# Patient Record
Sex: MALE | Race: WHITE | NOT HISPANIC OR LATINO | Employment: OTHER | URBAN - METROPOLITAN AREA
[De-identification: names, ages, dates, MRNs, and addresses within clinical notes are randomized per-mention and may not be internally consistent; named-entity substitution may affect disease eponyms.]

---

## 2017-01-02 PROCEDURE — 99285 EMERGENCY DEPT VISIT HI MDM: CPT

## 2017-01-02 ASSESSMENT — PAIN SCALES - GENERAL: PAINLEVEL_OUTOF10: 7

## 2017-01-03 ENCOUNTER — HOSPITAL ENCOUNTER (EMERGENCY)
Facility: MEDICAL CENTER | Age: 37
End: 2017-01-03
Attending: EMERGENCY MEDICINE
Payer: COMMERCIAL

## 2017-01-03 ENCOUNTER — APPOINTMENT (OUTPATIENT)
Dept: RADIOLOGY | Facility: MEDICAL CENTER | Age: 37
End: 2017-01-03
Attending: EMERGENCY MEDICINE
Payer: COMMERCIAL

## 2017-01-03 VITALS
HEART RATE: 49 BPM | BODY MASS INDEX: 23.64 KG/M2 | RESPIRATION RATE: 12 BRPM | WEIGHT: 178.35 LBS | HEIGHT: 73 IN | DIASTOLIC BLOOD PRESSURE: 92 MMHG | TEMPERATURE: 99.2 F | SYSTOLIC BLOOD PRESSURE: 148 MMHG | OXYGEN SATURATION: 98 %

## 2017-01-03 DIAGNOSIS — R10.84 GENERALIZED ABDOMINAL PAIN: ICD-10-CM

## 2017-01-03 LAB
ALBUMIN SERPL BCP-MCNC: 3.9 G/DL (ref 3.2–4.9)
ALBUMIN/GLOB SERPL: 1.8 G/DL
ALP SERPL-CCNC: 43 U/L (ref 30–99)
ALT SERPL-CCNC: 16 U/L (ref 2–50)
ANION GAP SERPL CALC-SCNC: 5 MMOL/L (ref 0–11.9)
AST SERPL-CCNC: 14 U/L (ref 12–45)
BASOPHILS # BLD AUTO: 0.6 % (ref 0–1.8)
BASOPHILS # BLD: 0.05 K/UL (ref 0–0.12)
BILIRUB SERPL-MCNC: 0.7 MG/DL (ref 0.1–1.5)
BUN SERPL-MCNC: 8 MG/DL (ref 8–22)
CALCIUM SERPL-MCNC: 9.2 MG/DL (ref 8.5–10.5)
CHLORIDE SERPL-SCNC: 104 MMOL/L (ref 96–112)
CO2 SERPL-SCNC: 28 MMOL/L (ref 20–33)
CREAT SERPL-MCNC: 1.1 MG/DL (ref 0.5–1.4)
EOSINOPHIL # BLD AUTO: 0.15 K/UL (ref 0–0.51)
EOSINOPHIL NFR BLD: 1.9 % (ref 0–6.9)
ERYTHROCYTE [DISTWIDTH] IN BLOOD BY AUTOMATED COUNT: 39.5 FL (ref 35.9–50)
GFR SERPL CREATININE-BSD FRML MDRD: >60 ML/MIN/1.73 M 2
GLOBULIN SER CALC-MCNC: 2.2 G/DL (ref 1.9–3.5)
GLUCOSE SERPL-MCNC: 110 MG/DL (ref 65–99)
HCT VFR BLD AUTO: 43.5 % (ref 42–52)
HGB BLD-MCNC: 14.6 G/DL (ref 14–18)
IMM GRANULOCYTES # BLD AUTO: 0.02 K/UL (ref 0–0.11)
IMM GRANULOCYTES NFR BLD AUTO: 0.2 % (ref 0–0.9)
LIPASE SERPL-CCNC: 8 U/L (ref 11–82)
LYMPHOCYTES # BLD AUTO: 2.05 K/UL (ref 1–4.8)
LYMPHOCYTES NFR BLD: 25.3 % (ref 22–41)
MCH RBC QN AUTO: 29 PG (ref 27–33)
MCHC RBC AUTO-ENTMCNC: 33.6 G/DL (ref 33.7–35.3)
MCV RBC AUTO: 86.5 FL (ref 81.4–97.8)
MONOCYTES # BLD AUTO: 0.83 K/UL (ref 0–0.85)
MONOCYTES NFR BLD AUTO: 10.3 % (ref 0–13.4)
NEUTROPHILS # BLD AUTO: 4.99 K/UL (ref 1.82–7.42)
NEUTROPHILS NFR BLD: 61.7 % (ref 44–72)
NRBC # BLD AUTO: 0 K/UL
NRBC BLD AUTO-RTO: 0 /100 WBC
PLATELET # BLD AUTO: 308 K/UL (ref 164–446)
PMV BLD AUTO: 9.3 FL (ref 9–12.9)
POTASSIUM SERPL-SCNC: 4.1 MMOL/L (ref 3.6–5.5)
PROT SERPL-MCNC: 6.1 G/DL (ref 6–8.2)
RBC # BLD AUTO: 5.03 M/UL (ref 4.7–6.1)
SODIUM SERPL-SCNC: 137 MMOL/L (ref 135–145)
WBC # BLD AUTO: 8.1 K/UL (ref 4.8–10.8)

## 2017-01-03 PROCEDURE — 80053 COMPREHEN METABOLIC PANEL: CPT

## 2017-01-03 PROCEDURE — 700111 HCHG RX REV CODE 636 W/ 250 OVERRIDE (IP): Performed by: EMERGENCY MEDICINE

## 2017-01-03 PROCEDURE — 85025 COMPLETE CBC W/AUTO DIFF WBC: CPT

## 2017-01-03 PROCEDURE — 96374 THER/PROPH/DIAG INJ IV PUSH: CPT

## 2017-01-03 PROCEDURE — 700117 HCHG RX CONTRAST REV CODE 255: Performed by: EMERGENCY MEDICINE

## 2017-01-03 PROCEDURE — 96375 TX/PRO/DX INJ NEW DRUG ADDON: CPT

## 2017-01-03 PROCEDURE — 74177 CT ABD & PELVIS W/CONTRAST: CPT

## 2017-01-03 PROCEDURE — 700105 HCHG RX REV CODE 258: Performed by: EMERGENCY MEDICINE

## 2017-01-03 PROCEDURE — 36415 COLL VENOUS BLD VENIPUNCTURE: CPT

## 2017-01-03 PROCEDURE — 83690 ASSAY OF LIPASE: CPT

## 2017-01-03 RX ORDER — ONDANSETRON 2 MG/ML
4 INJECTION INTRAMUSCULAR; INTRAVENOUS ONCE
Status: COMPLETED | OUTPATIENT
Start: 2017-01-03 | End: 2017-01-03

## 2017-01-03 RX ORDER — SODIUM CHLORIDE 9 MG/ML
1000 INJECTION, SOLUTION INTRAVENOUS ONCE
Status: COMPLETED | OUTPATIENT
Start: 2017-01-03 | End: 2017-01-03

## 2017-01-03 RX ORDER — DIPHENHYDRAMINE HYDROCHLORIDE 50 MG/ML
50 INJECTION INTRAMUSCULAR; INTRAVENOUS ONCE
Status: COMPLETED | OUTPATIENT
Start: 2017-01-03 | End: 2017-01-03

## 2017-01-03 RX ORDER — DICYCLOMINE HCL 20 MG
20 TABLET ORAL EVERY 6 HOURS
Qty: 30 TAB | Refills: 0 | Status: SHIPPED | OUTPATIENT
Start: 2017-01-03 | End: 2019-09-28

## 2017-01-03 RX ADMIN — ONDANSETRON 4 MG: 2 INJECTION, SOLUTION INTRAMUSCULAR; INTRAVENOUS at 02:05

## 2017-01-03 RX ADMIN — IOHEXOL 100 ML: 350 INJECTION, SOLUTION INTRAVENOUS at 02:49

## 2017-01-03 RX ADMIN — SODIUM CHLORIDE 1000 ML: 9 INJECTION, SOLUTION INTRAVENOUS at 02:04

## 2017-01-03 RX ADMIN — DIPHENHYDRAMINE HYDROCHLORIDE 50 MG: 50 INJECTION INTRAMUSCULAR; INTRAVENOUS at 03:44

## 2017-01-03 RX ADMIN — FENTANYL CITRATE 100 MCG: 50 INJECTION, SOLUTION INTRAMUSCULAR; INTRAVENOUS at 02:05

## 2017-01-03 ASSESSMENT — LIFESTYLE VARIABLES: DO YOU DRINK ALCOHOL: NO

## 2017-01-03 ASSESSMENT — PAIN SCALES - GENERAL: PAINLEVEL_OUTOF10: 3

## 2017-01-03 NOTE — ED AVS SNAPSHOT
After Visit Summary                                                                                                                Diego Abarca   MRN: [id number]    Department:  [facility], Emergency Dept   Date of Visit:  1/2/2017            [facility], Emergency Dept    [address] [facility] [address]    Phone:  [phone]      You were seen by     Jose Lawson M.D.      Your Diagnosis Was     Generalized abdominal pain     R10.84       These are the medications you received during your hospitalization from 01/02/2017 2313 to 01/03/2017 0333     Date/Time Order Dose Route Action    01/03/2017 0204 NS infusion 1,000 mL 1,000 mL Intravenous New Bag    01/03/2017 0205 fentaNYL (SUBLIMAZE) injection 100 mcg 100 mcg Intravenous Given    01/03/2017 0205 ondansetron (ZOFRAN) syringe/vial injection 4 mg 4 mg Intravenous Given    01/03/2017 0249 iohexol (OMNIPAQUE) 350 mg/mL 100 mL Intravenous Given      Follow-up Information     1. Follow up with [facility], Emergency Dept.    Specialty:  Emergency Medicine    Why:  If symptoms worsen    Contact information    [address] [place] [id number] [phone]        2. Follow up with [facility] In 3 days.    Why:  As needed    Contact information    [address] [facility] [address] [phone]      Medication Information     Review all of your home medications and newly ordered medications with your primary doctor and/or pharmacist as soon as possible. Follow medication instructions as directed by your doctor and/or pharmacist.     Please keep your complete medication list with you and share with your physician. Update the information when medications are discontinued, doses are changed, or new medications (including over-the-counter products) are added; and carry medication information at all times in the event of emergency situations.               Medication  List      START taking these medications        Instructions    dicyclomine 20 MG Tabs   Commonly known as:  BENTYL    Take 1 Tab by mouth every 6 hours.   Dose:  20 mg               Procedures and tests performed during your visit     CARDIAC MONITORING    CBC WITH DIFFERENTIAL    COMP METABOLIC PANEL    CONSENT FOR CONTRAST INJECTION    CT-ABDOMEN-PELVIS WITH    ESTIMATED GFR    IV Saline Lock    LIPASE    O2 Protocol    SALINE LOCK        Discharge Instructions       Abdominal Pain (Nonspecific)  Your exam might not show the exact reason you have abdominal pain. Since there are many different causes of abdominal pain, another checkup and more tests may be needed. It is very important to follow up for lasting (persistent) or worsening symptoms. A possible cause of abdominal pain in any person who still has his or her appendix is acute appendicitis. Appendicitis is often hard to diagnose. Normal blood tests, urine tests, ultrasound, and CT scans do not completely rule out early appendicitis or other causes of abdominal pain. Sometimes, only the changes that happen over time will allow appendicitis and other causes of abdominal pain to be determined. Other potential problems that may require surgery may also take time to become more apparent. Because of this, it is important that you follow all of the instructions below.  HOME CARE INSTRUCTIONS   · Rest as much as possible.   · Do not eat solid food until your pain is gone.   · While adults or children have pain: A diet of water, weak decaffeinated tea, broth or bouillon, gelatin, oral rehydration solutions (ORS), frozen ice pops, or ice chips may be helpful.   · When pain is gone in adults or children: Start a light diet (dry toast, crackers, applesauce, or white rice). Increase the diet slowly as long as it does not bother you. Eat no dairy products (including cheese and eggs) and no spicy, fatty, fried, or high-fiber foods.   · Use no alcohol, caffeine, or  cigarettes.   · Take your regular medicines unless your caregiver told you not to.   · Take any prescribed medicine as directed.   · Only take over-the-counter or prescription medicines for pain, discomfort, or fever as directed by your caregiver. Do not give aspirin to children.   If your caregiver has given you a follow-up appointment, it is very important to keep that appointment. Not keeping the appointment could result in a permanent injury and/or lasting (chronic) pain and/or disability. If there is any problem keeping the appointment, you must call to reschedule.   SEEK IMMEDIATE MEDICAL CARE IF:   · Your pain is not gone in 24 hours.   · Your pain becomes worse, changes location, or feels different.   · You or your child has an oral temperature above 102° F (38.9° C), not controlled by medicine.   · Your baby is older than 3 months with a rectal temperature of 102° F (38.9° C) or higher.   · Your baby is 3 months old or younger with a rectal temperature of 100.4° F (38° C) or higher.   · You have shaking chills.   · You keep throwing up (vomiting) or cannot drink liquids.   · There is blood in your vomit or you see blood in your bowel movements.   · Your bowel movements become dark or black.   · You have frequent bowel movements.   · Your bowel movements stop (become blocked) or you cannot pass gas.   · You have bloody, frequent, or painful urination.   · You have yellow discoloration in the skin or whites of the eyes.   · Your stomach becomes bloated or bigger.   · You have dizziness or fainting.   · You have chest or back pain.   MAKE SURE YOU:   · Understand these instructions.   · Will watch your condition.   · Will get help right away if you are not doing well or get worse.   Document Released: 12/18/2006 Document Revised: 03/11/2013 Document Reviewed: 11/15/2010  ExitCare® Patient Information ©2013 [employer].          Patient Information     Patient Information    Following emergency treatment: all  patient requiring follow-up care must return either to a private physician or a clinic if your condition worsens before you are able to obtain further medical attention, please return to the emergency room.     Billing Information    At [facility], we work to make the billing process streamlined for our patients.  Our Representatives are here to answer any questions you may have regarding your hospital bill.  If you have insurance coverage and have supplied your insurance information to us, we will submit a claim to your insurer on your behalf.  Should you have any questions regarding your bill, we can be reached online or by phone as follows:  Online: You are able pay your bills online or live chat with our representatives about any billing questions you may have. We are here to help Monday - Friday from 8:00am to 7:30pm and 9:00am - 12:00pm on Saturdays.  Please visit [email or web address]  for more information.   Phone:  [phone] or [phone]    Please note that your emergency physician, surgeon, pathologist, radiologist, anesthesiologist, and other specialists are not employed by [facility] and will therefore bill separately for their services.  Please contact them directly for any questions concerning their bills at the numbers below:     Emergency Physician Services:  [phone]  [facility]:  [phone]  [facility]:  [phone]  [facility]:  [phone]    1. Your final bill may vary from the amount quoted upon discharge if all procedures are not complete at that time, or if your doctor has additional procedures of which we are not aware. You will receive an additional bill if you return to the Emergency Department at [facility] for suture removal regardless of the facility of which the sutures were placed.     2. Please arrange for settlement of this account at the emergency registration.    3. All self-pay  accounts are due in full at the time of treatment.  If you are unable to meet this obligation then payment is expected within 4-5 days.     4. If you have had radiology studies (CT, X-ray, Ultrasound, MRI), you have received a preliminary result during your emergency department visit. Please contact the radiology department [phone] to receive a copy of your final result. Please discuss the Final result with your primary physician or with the follow up physician provided.     Crisis Hotline:  [place] Crisis Hotline:  6-881-ROZIRPT or 1-951.465.3149  [place] Crisis Hotline:    1-692.381.1833 or 727-567-0691         ED Discharge Follow Up Questions    1. In order to provide you with very good care, we would like to follow up with a phone call in the next few days.  May we have your permission to contact you?     YES /  NO    2. What is the best phone number to call you? (       )_____-__________    3. What is the best time to call you?      Morning  /  Afternoon  /  Evening                   Patient Signature:  ____________________________________________________________    Date:  ____________________________________________________________

## 2017-01-03 NOTE — DISCHARGE INSTRUCTIONS
Abdominal Pain (Nonspecific)  Your exam might not show the exact reason you have abdominal pain. Since there are many different causes of abdominal pain, another checkup and more tests may be needed. It is very important to follow up for lasting (persistent) or worsening symptoms. A possible cause of abdominal pain in any person who still has his or her appendix is acute appendicitis. Appendicitis is often hard to diagnose. Normal blood tests, urine tests, ultrasound, and CT scans do not completely rule out early appendicitis or other causes of abdominal pain. Sometimes, only the changes that happen over time will allow appendicitis and other causes of abdominal pain to be determined. Other potential problems that may require surgery may also take time to become more apparent. Because of this, it is important that you follow all of the instructions below.  HOME CARE INSTRUCTIONS   · Rest as much as possible.   · Do not eat solid food until your pain is gone.   · While adults or children have pain: A diet of water, weak decaffeinated tea, broth or bouillon, gelatin, oral rehydration solutions (ORS), frozen ice pops, or ice chips may be helpful.   · When pain is gone in adults or children: Start a light diet (dry toast, crackers, applesauce, or white rice). Increase the diet slowly as long as it does not bother you. Eat no dairy products (including cheese and eggs) and no spicy, fatty, fried, or high-fiber foods.   · Use no alcohol, caffeine, or cigarettes.   · Take your regular medicines unless your caregiver told you not to.   · Take any prescribed medicine as directed.   · Only take over-the-counter or prescription medicines for pain, discomfort, or fever as directed by your caregiver. Do not give aspirin to children.   If your caregiver has given you a follow-up appointment, it is very important to keep that appointment. Not keeping the appointment could result in a permanent injury and/or lasting (chronic) pain  and/or disability. If there is any problem keeping the appointment, you must call to reschedule.   SEEK IMMEDIATE MEDICAL CARE IF:   · Your pain is not gone in 24 hours.   · Your pain becomes worse, changes location, or feels different.   · You or your child has an oral temperature above 102° F (38.9° C), not controlled by medicine.   · Your baby is older than 3 months with a rectal temperature of 102° F (38.9° C) or higher.   · Your baby is 3 months old or younger with a rectal temperature of 100.4° F (38° C) or higher.   · You have shaking chills.   · You keep throwing up (vomiting) or cannot drink liquids.   · There is blood in your vomit or you see blood in your bowel movements.   · Your bowel movements become dark or black.   · You have frequent bowel movements.   · Your bowel movements stop (become blocked) or you cannot pass gas.   · You have bloody, frequent, or painful urination.   · You have yellow discoloration in the skin or whites of the eyes.   · Your stomach becomes bloated or bigger.   · You have dizziness or fainting.   · You have chest or back pain.   MAKE SURE YOU:   · Understand these instructions.   · Will watch your condition.   · Will get help right away if you are not doing well or get worse.   Document Released: 12/18/2006 Document Revised: 03/11/2013 Document Reviewed: 11/15/2010  ExitCare® Patient Information ©2013 [employer].

## 2017-01-03 NOTE — ED AVS SNAPSHOT
SportsHedge Access Code: [email or web address]  Expires: 2/2/2017  3:33 AM    SportsHedge  A secure, online tool to manage your health information     [employer]’s SportsHedge® is a secure, online tool that connects you to your personalized health information from the privacy of your home -- day or night - making it very easy for you to manage your healthcare. Once the activation process is completed, you can even access your medical information using the SportsHedge johanne, which is available for free in the Apple Johanne store or Google Play store.     SportsHedge provides the following levels of access (as shown below):   My Chart Features   [facility] Primary Care Doctor [facility]  Specialists [facility]  Urgent  Care Non-[facility]  Primary Care  Doctor   Email your healthcare team securely and privately 24/7 X X X X   Manage appointments: schedule your next appointment; view details of past/upcoming appointments X      Request prescription refills. X      View recent personal medical records, including lab and immunizations X X X X   View health record, including health history, allergies, medications X X X X   Read reports about your outpatient visits, procedures, consult and ER notes X X X X   See your discharge summary, which is a recap of your hospital and/or ER visit that includes your diagnosis, lab results, and care plan. X X       How to register for SportsHedge:  1. Go to  [email or web address].  2. Click on the Sign Up Now box, which takes you to the New Member Sign Up page. You will need to provide the following information:  a. Enter your SportsHedge Access Code exactly as it appears at the top of this page. (You will not need to use this code after you’ve completed the sign-up process. If you do not sign up before the expiration date, you must request a new code.)   b. Enter your date of birth.   c. Enter your home email address.   d. Click Submit, and follow the next screen’s instructions.  3. Create a SportsHedge ID. This will be your SportsHedge  login ID and cannot be changed, so think of one that is secure and easy to remember.  4. Create a [employer] password. You can change your password at any time.  5. Enter your Password Reset Question and Answer. This can be used at a later time if you forget your password.   6. Enter your e-mail address. This allows you to receive e-mail notifications when new information is available in [email or web address].  7. Click Sign Up. You can now view your health information.    For assistance activating your [employer].com account, call [phone]

## 2017-01-03 NOTE — ED NOTES
Attempted to complete discharge paperwork with patient. Patient extremely angry, yelling at RN that he is in too much pain and does not want to leave. Dr. Lawson called and case discussed.

## 2017-01-03 NOTE — ED NOTES
Patient extremely angry and cursing at RN before, during, and after medication being administered per orders.

## 2017-01-03 NOTE — ED PROVIDER NOTES
"ED Provider Note    CHIEF COMPLAINT  Chief Complaint   Patient presents with   • Abdominal Pain     diffused, radiating into lower back.  was admitting at [facility] earlier this week.  symptoms started on Saturday.   • Constipation     no relief with enemas   • Nausea       HPI  Diego Abarca is a 36 y.o. male who presents to the emergency department with abdominal discomfort. The patient states the pain started on Saturday. He was evaluated at [facility] at that time and he suspected it is secondary to constipation. The patient return to [facility] yesterday and their concern for an obstructive process versus a surgical etiology. He told the patient is insurance was from [employer] and therefore they discharged him home and told him to come here if he was acutely worse. The patient continues to have nausea as well as abdominal discomfort despite treatment with Zofran and pain medication. He does have some constipation. He has not had any blood in his stool. He has not had any recent foreign travel nor is he been exposed to any antibiotics. He has not had any prior abdominal surgeries.    REVIEW OF SYSTEMS  See HPI for further details. All other systems are negative.     PAST MEDICAL HISTORY  History reviewed. No pertinent past medical history.    SOCIAL HISTORY  Social History     Social History   • Marital Status:      Spouse Name: N/A   • Number of Children: N/A   • Years of Education: N/A     Social History Main Topics   • Smoking status: Never Smoker    • Smokeless tobacco: None   • Alcohol Use: Yes      Comment: occasionally   • Drug Use: Yes     Special: Inhaled      Comment: marijuana   • Sexual Activity: Not Asked     Other Topics Concern   • None     Social History Narrative           PHYSICAL EXAM  VITAL SIGNS: /92 mmHg  Pulse 48  Temp(Src) 37.3 °C (99.2 °F)  Resp 19  Ht 1.854 m (6' 0.99\")  Wt 80.9 kg (178 lb 5.6 oz)  BMI 23.54 kg/m2  SpO2 100%  Constitutional: Mild acute " distress, Non-toxic appearance.   HENT: Normocephalic, Atraumatic, tympanic membranes are intact and nonerythematous bilaterally, Oropharynx moist without exudates or erythema, Nose normal.   Eyes: PERRLA, EOMI, Conjunctiva normal.  Neck: Supple without meningismus  Lymphatic: No lymphadenopathy noted.   Cardiovascular: Normal heart rate, Normal rhythm, No murmurs, No rubs, No gallops.   Thorax & Lungs: Normal breath sounds, No respiratory distress, No wheezing, No chest tenderness.   Abdomen: Diffuse discomfort, no rebound, no guarding, normal bowel sounds  Skin: Warm, Dry, No erythema, No rash.   Back: No tenderness, No CVA tenderness.   Extremities: Atraumatic with symmetric distal pulses, No edema, No tenderness, No cyanosis, No clubbing.   Neurologic: Alert & oriented x 3, cranial nerves II through XII are intact, Normal motor function, Normal sensory function, No focal deficits noted.   Psychiatric: Affect normal, Judgment normal, Mood normal.       RADIOLOGY/PROCEDURES  CT-ABDOMEN-PELVIS WITH   Final Result      Small amount of nonspecific free pelvic fluid. No additional abnormalities are identified.            COURSE & MEDICAL DECISION MAKING  Pertinent Labs & Imaging studies reviewed. (See chart for details)  This a 36-year-old male who presents with abdominal discomfort. The CT scan does not show any evidence of a surgical source. This sounds like they had found mesenteric adenitis in the past. The patient's abdomen is nonsurgical. He does smoke marijuana daily basis and he may also be developing gastroparesis. His abdomen is nonsurgical as mentioned above. Therefore we'll discharge the patient home on Bentyl with instructions to start a bananas, rice, apples, and toast diet. He'll refrain from any further marijuana use. If he is not better in 48-72 hours I did give him a referral to a gastroenterologist. If he is acutely worse within the next 24 hours he'll return for repeat examination.    FINAL  IMPRESSION  1. Abdominal pain     Disposition  The patient will be discharged in stable condition.    Electronically signed by: Jose Lawson, 1/3/2017 2:03 AM

## 2017-01-03 NOTE — ED NOTES
Patient states that pain has improved to 2-3/10 with medication given per orders. States that nausea has resolves with medication given per orders.

## 2017-01-03 NOTE — ED NOTES
Patient complaining of 7/10 LUQ pain accompanied by 7/10 L low back pain. States that pain radiates to entire abdomen. Pain unrelieved at home with medication prescribed by [place]. Complains of fairly persistent nausea that he states is controlled with Zofran ODT at home.

## 2017-01-03 NOTE — ED NOTES
Diego Abarca  Chief Complaint   Patient presents with   • Abdominal Pain     diffused, radiating into lower back.  was admitting at [facility] earlier this week.  symptoms started on Saturday.   • Constipation     no relief with enemas   • Nausea     Pt ambulatory to triage with above complaint.  VSS. C/o sharp/ cramping pain 7/10.  Was told at [facility] that he might need surgery, but not sure what.   Pt returned to lobby, educated on triage process, and to inform staff of any changes or concerns.

## 2017-01-03 NOTE — ED AVS SNAPSHOT
1/3/2017          Diego Abarca  [address] [facility] [facility] [address]    Dear Diego:    [facility] wants to ensure your discharge home is safe and you or your loved ones have had all your questions answered regarding your care after you leave the hospital.    You may receive a telephone call within two days of your discharge.  This call is to make certain you understand your discharge instructions as well as ensure we provided you with the best care possible during your stay with us.     The call will only last approximately 3-5 minutes and will be done by a nurse.    Once again, we want to ensure your discharge home is safe and that you have a clear understanding of any next steps in your care.  If you have any questions or concerns, please do not hesitate to contact us, we are here for you.  Thank you for choosing [facility] for your healthcare needs.    Sincerely,    Stewart Pryor    [facility]

## 2017-01-03 NOTE — ED NOTES
"Discharge paperwork reviewed with patient. When discussing follow-up appointments with [facility] patient states \"I've been there before. I probably won't go there. If I have more pain I'll just figure something out.\" Patient signed discharge papers.  "

## 2017-01-04 ENCOUNTER — HOSPITAL ENCOUNTER (OUTPATIENT)
Facility: MEDICAL CENTER | Age: 37
End: 2017-01-04
Attending: EMERGENCY MEDICINE | Admitting: INTERNAL MEDICINE
Payer: COMMERCIAL

## 2017-01-04 VITALS
BODY MASS INDEX: 24.25 KG/M2 | HEART RATE: 53 BPM | SYSTOLIC BLOOD PRESSURE: 119 MMHG | TEMPERATURE: 98.4 F | HEIGHT: 73 IN | OXYGEN SATURATION: 98 % | WEIGHT: 182.98 LBS | DIASTOLIC BLOOD PRESSURE: 57 MMHG | RESPIRATION RATE: 16 BRPM

## 2017-01-04 DIAGNOSIS — Z53.29 LEFT AGAINST MEDICAL ADVICE: ICD-10-CM

## 2017-01-04 DIAGNOSIS — R10.84 GENERALIZED ABDOMINAL PAIN: ICD-10-CM

## 2017-01-04 DIAGNOSIS — R52 INTRACTABLE PAIN: ICD-10-CM

## 2017-01-04 PROBLEM — R10.9 ABDOMINAL PAIN: Status: ACTIVE | Noted: 2017-01-04

## 2017-01-04 PROCEDURE — G0378 HOSPITAL OBSERVATION PER HR: HCPCS

## 2017-01-04 PROCEDURE — 96372 THER/PROPH/DIAG INJ SC/IM: CPT

## 2017-01-04 PROCEDURE — 36415 COLL VENOUS BLD VENIPUNCTURE: CPT

## 2017-01-04 PROCEDURE — 99285 EMERGENCY DEPT VISIT HI MDM: CPT

## 2017-01-04 PROCEDURE — 700105 HCHG RX REV CODE 258: Performed by: EMERGENCY MEDICINE

## 2017-01-04 PROCEDURE — 96375 TX/PRO/DX INJ NEW DRUG ADDON: CPT

## 2017-01-04 PROCEDURE — 96374 THER/PROPH/DIAG INJ IV PUSH: CPT

## 2017-01-04 PROCEDURE — 96361 HYDRATE IV INFUSION ADD-ON: CPT

## 2017-01-04 PROCEDURE — 700111 HCHG RX REV CODE 636 W/ 250 OVERRIDE (IP): Performed by: EMERGENCY MEDICINE

## 2017-01-04 RX ORDER — HALOPERIDOL 5 MG/ML
5 INJECTION INTRAMUSCULAR ONCE
Status: COMPLETED | OUTPATIENT
Start: 2017-01-04 | End: 2017-01-04

## 2017-01-04 RX ORDER — ONDANSETRON 2 MG/ML
4 INJECTION INTRAMUSCULAR; INTRAVENOUS ONCE
Status: COMPLETED | OUTPATIENT
Start: 2017-01-04 | End: 2017-01-04

## 2017-01-04 RX ORDER — ONDANSETRON 2 MG/ML
4 INJECTION INTRAMUSCULAR; INTRAVENOUS
Status: CANCELLED | OUTPATIENT
Start: 2017-01-04

## 2017-01-04 RX ORDER — SODIUM CHLORIDE 9 MG/ML
2000 INJECTION, SOLUTION INTRAVENOUS ONCE
Status: COMPLETED | OUTPATIENT
Start: 2017-01-04 | End: 2017-01-04

## 2017-01-04 RX ORDER — DEXTROSE AND SODIUM CHLORIDE 5; .9 G/100ML; G/100ML
INJECTION, SOLUTION INTRAVENOUS CONTINUOUS
Status: CANCELLED | OUTPATIENT
Start: 2017-01-04

## 2017-01-04 RX ORDER — DICYCLOMINE HCL 20 MG
20 TABLET ORAL EVERY 6 HOURS
Status: DISCONTINUED | OUTPATIENT
Start: 2017-01-04 | End: 2017-01-04 | Stop reason: HOSPADM

## 2017-01-04 RX ADMIN — HYDROMORPHONE HYDROCHLORIDE 1 MG: 1 INJECTION, SOLUTION INTRAMUSCULAR; INTRAVENOUS; SUBCUTANEOUS at 06:40

## 2017-01-04 RX ADMIN — ONDANSETRON HYDROCHLORIDE 4 MG: 2 INJECTION, SOLUTION INTRAMUSCULAR; INTRAVENOUS at 06:40

## 2017-01-04 RX ADMIN — HALOPERIDOL LACTATE 5 MG: 5 INJECTION, SOLUTION INTRAMUSCULAR at 09:00

## 2017-01-04 RX ADMIN — SODIUM CHLORIDE 2000 ML: 9 INJECTION, SOLUTION INTRAVENOUS at 06:40

## 2017-01-04 ASSESSMENT — PAIN SCALES - GENERAL: PAINLEVEL_OUTOF10: 10

## 2017-01-04 NOTE — ED AVS SNAPSHOT
1/4/2017          Diego Abarca  [address] [facility] [address]    Dear Diego:    [facility] wants to ensure your discharge home is safe and you or your loved ones have had all your questions answered regarding your care after you leave the hospital.    You may receive a telephone call within two days of your discharge.  This call is to make certain you understand your discharge instructions as well as ensure we provided you with the best care possible during your stay with us.     The call will only last approximately 3-5 minutes and will be done by a nurse.    Once again, we want to ensure your discharge home is safe and that you have a clear understanding of any next steps in your care.  If you have any questions or concerns, please do not hesitate to contact us, we are here for you.  Thank you for choosing [facility] for your healthcare needs.    Sincerely,    Stewart Pryor    [facility]

## 2017-01-04 NOTE — ED NOTES
Pt left AMA. AMA form signed and on chart. Pt D/C to home. IV removed. D/C instructions given. Pt v/u. Pt leaves ED with no acute changes, complaints or concerns.

## 2017-01-04 NOTE — ED NOTES
"Pt medicated per ERP order. Pt states \" I dont want to stay if they arent going to do anything about my pain. I will just come back later today after i go home and throw up.\"  "

## 2017-01-04 NOTE — ED NOTES
Med Rec completed per patient  Allergies reviewed  No ORAL antibiotics in last 30 days    Family at bedside stated she thinks he got a steroid shot at [facility] on 01-02-17

## 2017-01-04 NOTE — ED NOTES
Chief Complaint   Patient presents with   • Abdominal Pain     kidney pain   • Migraine   • Constipation   Per pt, he has been seen 3 other times in the last 3-4 days at [facility] and [facility] x2.

## 2017-01-04 NOTE — ED AVS SNAPSHOT
After Visit Summary                                                                                                                Diego Abarca   MRN: [id number]    Department:  [facility], Emergency Dept   Date of Visit:  1/4/2017            [facility], Emergency Dept    [address]    [place] [address]    Phone:  [phone]      You were seen by     Silverio Hardy M.D.      Your Diagnosis Was     Generalized abdominal pain     R10.84       These are the medications you received during your hospitalization from 01/04/2017 0541 to 01/04/2017 0959     Date/Time Order Dose Route Action    01/04/2017 0640 NS infusion 2,000 mL 2,000 mL Intravenous New Bag    01/04/2017 0640 HYDROmorphone (DILAUDID) injection 1 mg 1 mg Intravenous Given    01/04/2017 0640 ondansetron (ZOFRAN) syringe/vial injection 4 mg 4 mg Intravenous Given    01/04/2017 0900 haloperidol lactate (HALDOL) injection 5 mg 5 mg Intramuscular Given      Medication Information     Review all of your home medications and newly ordered medications with your primary doctor and/or pharmacist as soon as possible. Follow medication instructions as directed by your doctor and/or pharmacist.     Please keep your complete medication list with you and share with your physician. Update the information when medications are discontinued, doses are changed, or new medications (including over-the-counter products) are added; and carry medication information at all times in the event of emergency situations.               Medication List      ASK your doctor about these medications        Instructions    dicyclomine 20 MG Tabs   Commonly known as:  BENTYL    Take 1 Tab by mouth every 6 hours.   Dose:  20 mg               Procedures and tests performed during your visit     Procedure/Test Number of Times Performed    ADMISSION ORDER (OBSERVATION-OUTPATIENT) 2            Patient Information     Patient  Information    Following emergency treatment: all patient requiring follow-up care must return either to a private physician or a clinic if your condition worsens before you are able to obtain further medical attention, please return to the emergency room.     Billing Information    At [facility], we work to make the billing process streamlined for our patients.  Our Representatives are here to answer any questions you may have regarding your hospital bill.  If you have insurance coverage and have supplied your insurance information to us, we will submit a claim to your insurer on your behalf.  Should you have any questions regarding your bill, we can be reached online or by phone as follows:  Online: You are able pay your bills online or live chat with our representatives about any billing questions you may have. We are here to help Monday - Friday from 8:00am to 7:30pm and 9:00am - 12:00pm on Saturdays.  Please visit [email or web address]  for more information.   Phone:  [phone] or [phone]    Please note that your emergency physician, surgeon, pathologist, radiologist, anesthesiologist, and other specialists are not employed by [facility] and will therefore bill separately for their services.  Please contact them directly for any questions concerning their bills at the numbers below:     Emergency Physician Services:  [phone]  [facility]:  [phone]  [facility]:  [phone]  [facility]:  [phone]    1. Your final bill may vary from the amount quoted upon discharge if all procedures are not complete at that time, or if your doctor has additional procedures of which we are not aware. You will receive an additional bill if you return to the Emergency Department at [facility] for suture removal regardless of the facility of which the sutures were placed.     2. Please arrange for settlement of this account  at the emergency registration.    3. All self-pay accounts are due in full at the time of treatment.  If you are unable to meet this obligation then payment is expected within 4-5 days.     4. If you have had radiology studies (CT, X-ray, Ultrasound, MRI), you have received a preliminary result during your emergency department visit. Please contact the radiology department [phone] to receive a copy of your final result. Please discuss the Final result with your primary physician or with the follow up physician provided.     Crisis Hotline:  [place] Crisis Hotline:  9-491-PSXCUWG or 1-616.711.9960  [place] Crisis Hotline:    1-899.425.8446 or 078-490-7623         ED Discharge Follow Up Questions    1. In order to provide you with very good care, we would like to follow up with a phone call in the next few days.  May we have your permission to contact you?     YES /  NO    2. What is the best phone number to call you? (       )_____-__________    3. What is the best time to call you?      Morning  /  Afternoon  /  Evening                   Patient Signature:  ____________________________________________________________    Date:  ____________________________________________________________

## 2017-01-04 NOTE — ED AVS SNAPSHOT
[employer] Access Code: [email or web address]  Expires: 2/2/2017  3:33 AM    TenasiTech  A secure, online tool to manage your health information     [email or web address]’s TenasiTech® is a secure, online tool that connects you to your personalized health information from the privacy of your home -- day or night - making it very easy for you to manage your healthcare. Once the activation process is completed, you can even access your medical information using the [employer] johanne, which is available for free in the Apple Johanne store or Google Play store.     [employer] provides the following levels of access (as shown below):   My Chart Features   [facility] Primary Care Doctor [facility]  Specialists [facility]  Urgent  Care Non-[facility]  Primary Care  Doctor   Email your healthcare team securely and privately 24/7 X X X X   Manage appointments: schedule your next appointment; view details of past/upcoming appointments X      Request prescription refills. X      View recent personal medical records, including lab and immunizations X X X X   View health record, including health history, allergies, medications X X X X   Read reports about your outpatient visits, procedures, consult and ER notes X X X X   See your discharge summary, which is a recap of your hospital and/or ER visit that includes your diagnosis, lab results, and care plan. X X       How to register for [employer]:  1. Go to  [email or web address].  2. Click on the Sign Up Now box, which takes you to the New Member Sign Up page. You will need to provide the following information:  a. Enter your [employer] Access Code exactly as it appears at the top of this page. (You will not need to use this code after you’ve completed the sign-up process. If you do not sign up before the expiration date, you must request a new code.)   b. Enter your date of birth.   c. Enter your home email address.   d. Click Submit, and follow the next screen’s instructions.  3. Create a TenasiTech ID. This will be your TenasiTech  login ID and cannot be changed, so think of one that is secure and easy to remember.  4. Create a [employer]weet password. You can change your password at any time.  5. Enter your Password Reset Question and Answer. This can be used at a later time if you forget your password.   6. Enter your e-mail address. This allows you to receive e-mail notifications when new information is available in [employer].  7. Click Sign Up. You can now view your health information.    For assistance activating your [employer] account, call [phone]

## 2017-01-04 NOTE — ED PROVIDER NOTES
"ED Provider Note    CHIEF COMPLAINT  Chief Complaint   Patient presents with   • Abdominal Pain     kidney pain   • Migraine   • Constipation       HPI  Diego Abarca is a 36 y.o. male who presents to the emergency department with a chief complaint of abdominal pain. The patient localizes abdominal pain to the upper abdomen and says it radiates around to both sides. Associated with nausea and vomiting. The patient has been seen in the emergency department on 3 prior occasions. He has undergone 3 previous CT scans and multiple laboratory studies. The patient returns today for ongoing abdominal pain. It is associated with nausea and vomiting. He rates it as severe. He has been feeling somewhat constipated. He has not had any diarrhea.    REVIEW OF SYSTEMS  See HPI for further details. All other systems are negative.     PAST MEDICAL HISTORY  History reviewed. No pertinent past medical history.    FAMILY HISTORY  History reviewed. No pertinent family history.    SOCIAL HISTORY  Social History     Social History   • Marital Status:      Spouse Name: N/A   • Number of Children: N/A   • Years of Education: N/A     Social History Main Topics   • Smoking status: Never Smoker    • Smokeless tobacco: None   • Alcohol Use: Yes      Comment: rarely   • Drug Use: Yes     Special: Inhaled      Comment: marijuana daily   • Sexual Activity: Not Asked     Other Topics Concern   • None     Social History Narrative       SURGICAL HISTORY  Past Surgical History   Procedure Laterality Date   • Other orthopedic surgery         CURRENT MEDICATIONS  Home Medications     Reviewed by Lisandra Scott R.N. (Registered Nurse) on 01/04/17 at 0608  Med List Status: <None>    Medication Last Dose Status    dicyclomine (BENTYL) 20 MG Tab  Active                ALLERGIES  No Known Allergies    PHYSICAL EXAM  VITAL SIGNS: /101 mmHg  Pulse 46  Temp(Src) 36.9 °C (98.4 °F)  Resp 16  Ht 1.854 m (6' 0.99\")  Wt 83 kg (182 lb 15.7 oz)  BMI " 24.15 kg/m2  SpO2 100%  Constitutional: Well developed, Well nourished, mild distress, Non-toxic appearance.   HENT: Normocephalic, Atraumatic, Bilateral external ears normal, Oropharynx moist, No oral exudates, Nose normal.   Eyes: PERRL, EOMI, Conjunctiva normal, No discharge.   Neck: Normal range of motion, No tenderness, Supple, No stridor.   Lymphatic: No lymphadenopathy noted.   Cardiovascular: Normal heart rate, Normal rhythm, No murmurs, No rubs, No gallops.   Thorax & Lungs: Normal breath sounds, No respiratory distress, No wheezing, No chest tenderness.   Abdomen: Soft, tenderness in the epigastrium, no lower abdominal tenderness to palpation, no peritoneal signs, active bowel sounds.   Skin: Warm, Dry, No erythema, No rash.   Back: No tenderness, No CVA tenderness.   Extremities: Intact distal pulses, No edema, No tenderness, No cyanosis, No clubbing.   Neurologic: Alert & oriented x 3, Normal motor function, Normal sensory function, No focal deficits noted.       RADIOLOGY/PROCEDURES      COURSE & MEDICAL DECISION MAKING  Pertinent Labs & Imaging studies reviewed. (See chart for details)    The patient presents today with abdominal pain. He is had a fairly extensive workup on numerous occasions. This evaluation has been unrevealing. I reviewed the most recent workup in the electronic medical record. CT scan was performed and laboratory studies were performed. These were unrevealing. The patient seems to be a fairly heavy marijuana user. I wonder if this may be related to cannabinoids hyperemesis syndrome. The patient is treated in the emergency department with IV fluids, hydromorphone, and Zofran. He has some improvement of his symptoms. However his symptoms then return. I elected to treat the patient with intramuscular Haldol. Given his multiple visits to the emergency department I felt that admission to hospital was appropriate. I spoke with the on-call hospitalist Dr. Hanson for admission.      10:04  AM Dr. Hanson saw the patient at bedside. Apparently the patient is choosing to leave against medical advice. He knows that my recommendation is admission to the hospital for further evaluation and treatment. He seems to be frustrated and angry and requesting additional pain medications.    FINAL IMPRESSION  1. Generalized abdominal pain    2. Intractable pain    3. Left against medical advice            Electronically signed by: Silverio Hardy, 1/4/2017 6:35 AM

## 2017-01-05 NOTE — ADDENDUM NOTE
Encounter addended by: Niecy Bland R.N. on: 1/5/2017  1:14 PM<BR>     Documentation filed: Inpatient Document Flowsheet

## 2017-07-12 ENCOUNTER — HOSPITAL ENCOUNTER (OUTPATIENT)
Facility: MEDICAL CENTER | Age: 37
End: 2017-07-12
Payer: COMMERCIAL

## 2017-07-12 LAB
BDY FAT % MEASURED: 10.5 %
BP DIAS: 64 MMHG
BP SYS: 104 MMHG
CHOLEST SERPL-MCNC: 126 MG/DL (ref 100–199)
DIABETES HTDIA: NO
EVENT NAME HTEVT: NORMAL
FASTING HTFAS: YES
GLUCOSE SERPL-MCNC: 85 MG/DL (ref 65–99)
HDLC SERPL-MCNC: 55 MG/DL
HYPERTENSION HTHYP: NO
LDLC SERPL CALC-MCNC: 59 MG/DL
SCREENING LOC CITY HTCIT: NORMAL
SCREENING LOC STATE HTSTA: NORMAL
SCREENING LOCATION HTLOC: NORMAL
SMOKING HTSMO: NO
SUBSCRIBER ID HTSID: NORMAL
TRIGL SERPL-MCNC: 61 MG/DL (ref 0–149)

## 2017-07-12 PROCEDURE — S5190 WELLNESS ASSESSMENT BY NONPH: HCPCS

## 2017-07-12 PROCEDURE — 82947 ASSAY GLUCOSE BLOOD QUANT: CPT

## 2017-07-12 PROCEDURE — 80061 LIPID PANEL: CPT

## 2018-02-27 ENCOUNTER — OFFICE VISIT (OUTPATIENT)
Dept: PULMONOLOGY | Facility: HOSPICE | Age: 38
End: 2018-02-27
Payer: COMMERCIAL

## 2018-02-27 VITALS
RESPIRATION RATE: 14 BRPM | HEART RATE: 70 BPM | HEIGHT: 73 IN | DIASTOLIC BLOOD PRESSURE: 60 MMHG | WEIGHT: 184 LBS | TEMPERATURE: 97.5 F | SYSTOLIC BLOOD PRESSURE: 98 MMHG | OXYGEN SATURATION: 96 % | BODY MASS INDEX: 24.39 KG/M2

## 2018-02-27 DIAGNOSIS — J45.20 MILD INTERMITTENT ASTHMA WITHOUT COMPLICATION: ICD-10-CM

## 2018-02-27 DIAGNOSIS — Z14.8 ALPHA-1-ANTITRYPSIN DEFICIENCY CARRIER: ICD-10-CM

## 2018-02-27 PROCEDURE — 99244 OFF/OP CNSLTJ NEW/EST MOD 40: CPT | Performed by: INTERNAL MEDICINE

## 2018-02-27 RX ORDER — ALBUTEROL SULFATE 90 UG/1
2 AEROSOL, METERED RESPIRATORY (INHALATION) EVERY 6 HOURS PRN
COMMUNITY
End: 2019-01-21 | Stop reason: SDUPTHER

## 2018-02-27 RX ORDER — OMEPRAZOLE 20 MG/1
CAPSULE, DELAYED RELEASE ORAL
COMMUNITY
Start: 2018-01-09 | End: 2019-09-28

## 2018-02-27 RX ORDER — SODIUM, POTASSIUM,MAG SULFATES 17.5-3.13G
SOLUTION, RECONSTITUTED, ORAL ORAL
COMMUNITY
Start: 2018-02-22 | End: 2019-09-28

## 2018-02-27 NOTE — PROGRESS NOTES
Chief Complaint   Patient presents with   • New Patient     Alpha 1 deficiency       HPI:  The patient is a 37-year-old man who [facility]. He is a lifelong nonsmoker of cigarettes. However, he does smoke marijuana several times per week. He has a family history of lung disease. Testing in 2012 demonstrated that he is an alpha one antitrypsin difficiency carrier with a phenotype MZ. Testing revealed that he has normal levels of alpha-1 antitrypsin.  He will not need replacement therapy. However, pulmonary function testing did demonstrate that he has mild asthma. He says that he wheezes  rarely. He is not complaining of any cough or sputum production. He has no limitation of activity. He has not had any significant asthma episodes. He does have some mild reflux and is on Prilosec. Previous pulmonary function test demonstrated:  FEV1 4.14 liters which was 86% of predicted. FEV1 FVC ratio 81%. MVV 85% of predicted.  After bronchodilator his FEV1 improved to 4.73 L which is 98% of predicted. This is a 14% improvement.  Lung volumes and DLCO and airway resistance all normal.    Past Medical History:   Diagnosis Date   • Alpha 1-antitrypsin PiMZ phenotype    • Chickenpox    • Bel[place]ian measles    • Mumps        ROS:   Constitutional: Denies fevers, chills, night sweats, fatigue or weight loss  Eyes: Denies vision loss, pain, drainage, double vision  Ears, Nose, Throat: Denies earache, tinnitus, hoarseness  Cardiovascular: Denies chest pain, tightness, palpitations  Respiratory: Denies shortness of breath, sputum production, cough, hemoptysis  Sleep: Denies, snoring, apnea  GI: Denies abdominal pain, nausea, vomiting, diarrhea  : Denies frequent urination, hematuria, painful urination  Musculoskeletal: Denies back pain, painful joints, sore muscles  Neurological: Denies headaches, seizures  Skin: Denies rashes, color changes  Psychiatric: Denies depression or thoughts of  "suicide  Hematologic: Denies bleeding tendency or clotting tendency  Allergic/Immunologic: Denies rhinitis, skin sensitivity    Social History     Social History   • Marital status:      Spouse name: N/A   • Number of children: N/A   • Years of education: N/A     Occupational History   • Not on file.     Social History Main Topics   • Smoking status: Never Smoker   • Smokeless tobacco: Never Used   • Alcohol use Yes      Comment: rarely   • Drug use: Yes     Types: Inhaled      Comment: marijuana daily   • Sexual activity: Not on file     Other Topics Concern   • Not on file     Social History Narrative   • No narrative on file     Patient has no known allergies.  Current Outpatient Prescriptions on File Prior to Visit   Medication Sig Dispense Refill   • dicyclomine (BENTYL) 20 MG Tab Take 1 Tab by mouth every 6 hours. 30 Tab 0     No current facility-administered medications on file prior to visit.      Blood pressure (!) 98/60, pulse 70, temperature 36.4 °C (97.5 °F), resp. rate 14, height 1.854 m (6' 0.99\"), weight 83.5 kg (184 lb), SpO2 96 %.  History reviewed. No pertinent family history.    Physical Exam:    HEENT: PERRLA, EOMI, no scleral icterus, no nasal or oral lesions  Neck: No thyromegaly, no adenopathy, no bruits  Mallampatti: Grade II  Lungs: Equal breath sounds, no wheezes or crackles  Heart: Regular rate and rhythm, no gallops or murmurs  Abdomen: Soft, benign, no organomegaly  Extremities: No clubbing, cyanosis, or edema  Neurologic: Cranial nerve, motor, and sensory exam are normal    1. Alpha-1-antitrypsin deficiency carrier (CMS-[facility])    2. Mild intermittent asthma without complication        He is phenotype MZ with normal levels of alpha one antitrypsin.  He should not require replacement therapy.  He does have mild asthma. However, at this point it is truly mild and not causing him any difficulty. He uses albuterol on an as-needed basis. So far he does not appear to require " anti-inflammatory therapy. If his asthma worsens. He may require repeat evaluation and initiation of more aggressive therapy.    We did discuss the implications of being a carrier. I have recommended that his wife have alpha one phenotype testing. Certainly he should discuss testing his children with their pediatrician.    I do not believe he needs yearly pulmonary function testing. We will see him in the future on an as-needed basis.

## 2018-02-27 NOTE — LETTER
Sunil Patiño M.D.  [facility]   [address] [place], [place] [address], [place] [address]  Phone: [phone] - Fax: [phone]           Encounter Date: 2/27/2018  Provider: Sunil Patiño M.D.  Location of Care: [facility] PULMONARY MEDICINE      Patient:   Diego Abarca   MR Number: [id number]   YOB: 1980     PROGRESS NOTE:  Chief Complaint   Patient presents with   • New Patient     Alpha 1 deficiency       HPI:  The patient is a 37-year-old man who was [facility] evaluated by [facility]. He is a lifelong nonsmoker of cigarettes. However, he does smoke marijuana several times per week. He has a family history of lung disease. Testing in 2012 demonstrated that he is an alpha one antitrypsin difficiency carrier with a phenotype MZ. Testing revealed that he has normal levels of alpha-1 antitrypsin.  He will not need replacement therapy. However, pulmonary function testing did demonstrate that he has mild asthma. He says that he wheezes  rarely. He is not complaining of any cough or sputum production. He has no limitation of activity. He has not had any significant asthma episodes. He does have some mild reflux and is on Prilosec. Previous pulmonary function test demonstrated:  FEV1 4.14 liters which was 86% of predicted. FEV1 FVC ratio 81%. MVV 85% of predicted.  After bronchodilator his FEV1 improved to 4.73 L which is 98% of predicted. This is a 14% improvement.  Lung volumes and DLCO and airway resistance all normal.    Past Medical History:   Diagnosis Date   • Alpha 1-antitrypsin PiMZ phenotype    • Chickenpox    • Swazi measles    • Mumps        ROS:   Constitutional: Denies fevers, chills, night sweats, fatigue or weight loss  Eyes: Denies vision loss, pain, drainage, double vision  Ears, Nose, Throat: Denies earache, tinnitus, hoarseness  Cardiovascular: Denies chest pain, tightness, palpitations  Respiratory: Denies shortness of  "breath, sputum production, cough, hemoptysis  Sleep: Denies, snoring, apnea  GI: Denies abdominal pain, nausea, vomiting, diarrhea  : Denies frequent urination, hematuria, painful urination  Musculoskeletal: Denies back pain, painful joints, sore muscles  Neurological: Denies headaches, seizures  Skin: Denies rashes, color changes  Psychiatric: Denies depression or thoughts of suicide  Hematologic: Denies bleeding tendency or clotting tendency  Allergic/Immunologic: Denies rhinitis, skin sensitivity    Social History     Social History   • Marital status:      Spouse name: N/A   • Number of children: N/A   • Years of education: N/A     Occupational History   • Not on file.     Social History Main Topics   • Smoking status: Never Smoker   • Smokeless tobacco: Never Used   • Alcohol use Yes      Comment: rarely   • Drug use: Yes     Types: Inhaled      Comment: marijuana daily   • Sexual activity: Not on file     Other Topics Concern   • Not on file     Social History Narrative   • No narrative on file     Patient has no known allergies.  Current Outpatient Prescriptions on File Prior to Visit   Medication Sig Dispense Refill   • dicyclomine (BENTYL) 20 MG Tab Take 1 Tab by mouth every 6 hours. 30 Tab 0     No current facility-administered medications on file prior to visit.      Blood pressure (!) 98/60, pulse 70, temperature 36.4 °C (97.5 °F), resp. rate 14, height 1.854 m (6' 0.99\"), weight 83.5 kg (184 lb), SpO2 96 %.  History reviewed. No pertinent family history.    Physical Exam:    HEENT: PERRLA, EOMI, no scleral icterus, no nasal or oral lesions  Neck: No thyromegaly, no adenopathy, no bruits  Mallampatti: Grade II  Lungs: Equal breath sounds, no wheezes or crackles  Heart: Regular rate and rhythm, no gallops or murmurs  Abdomen: Soft, benign, no organomegaly  Extremities: No clubbing, cyanosis, or edema  Neurologic: Cranial nerve, motor, and sensory exam are normal    1. Alpha-1-antitrypsin " deficiency carrier (CMS-[facility])    2. Mild intermittent asthma without complication        He is phenotype MZ with normal levels of alpha one antitrypsin.  He should not require replacement therapy.  He does have mild asthma. However, at this point it is truly mild and not causing him any difficulty. He uses albuterol on an as-needed basis. So far he does not appear to require anti-inflammatory therapy. If his asthma worsens. He may require repeat evaluation and initiation of more aggressive therapy.    We did discuss the implications of being a carrier. I have recommended that his wife have alpha one phenotype testing. Certainly he should discuss testing his children with their pediatrician.    I do not believe he needs yearly pulmonary function testing. We will see him in the future on an as-needed basis.      Electronically signed by Sunil Patiño M.D.  on 02/27/18    No Recipients

## 2018-07-20 ENCOUNTER — HOSPITAL ENCOUNTER (OUTPATIENT)
Dept: LAB | Facility: MEDICAL CENTER | Age: 38
End: 2018-07-20
Attending: FAMILY MEDICINE
Payer: COMMERCIAL

## 2018-07-20 LAB
BASOPHILS # BLD AUTO: 0.8 % (ref 0–1.8)
BASOPHILS # BLD: 0.04 K/UL (ref 0–0.12)
EOSINOPHIL # BLD AUTO: 0.09 K/UL (ref 0–0.51)
EOSINOPHIL NFR BLD: 1.8 % (ref 0–6.9)
ERYTHROCYTE [DISTWIDTH] IN BLOOD BY AUTOMATED COUNT: 41.1 FL (ref 35.9–50)
HCT VFR BLD AUTO: 47.9 % (ref 42–52)
HGB BLD-MCNC: 16.3 G/DL (ref 14–18)
IMM GRANULOCYTES # BLD AUTO: 0.01 K/UL (ref 0–0.11)
IMM GRANULOCYTES NFR BLD AUTO: 0.2 % (ref 0–0.9)
LYMPHOCYTES # BLD AUTO: 1.15 K/UL (ref 1–4.8)
LYMPHOCYTES NFR BLD: 23.3 % (ref 22–41)
MCH RBC QN AUTO: 29.7 PG (ref 27–33)
MCHC RBC AUTO-ENTMCNC: 34 G/DL (ref 33.7–35.3)
MCV RBC AUTO: 87.2 FL (ref 81.4–97.8)
MONOCYTES # BLD AUTO: 0.52 K/UL (ref 0–0.85)
MONOCYTES NFR BLD AUTO: 10.5 % (ref 0–13.4)
NEUTROPHILS # BLD AUTO: 3.13 K/UL (ref 1.82–7.42)
NEUTROPHILS NFR BLD: 63.4 % (ref 44–72)
NRBC # BLD AUTO: 0 K/UL
NRBC BLD-RTO: 0 /100 WBC
PLATELET # BLD AUTO: 256 K/UL (ref 164–446)
PMV BLD AUTO: 9.6 FL (ref 9–12.9)
RBC # BLD AUTO: 5.49 M/UL (ref 4.7–6.1)
WBC # BLD AUTO: 4.9 K/UL (ref 4.8–10.8)

## 2018-07-20 PROCEDURE — 36415 COLL VENOUS BLD VENIPUNCTURE: CPT

## 2018-07-20 PROCEDURE — 85025 COMPLETE CBC W/AUTO DIFF WBC: CPT

## 2018-07-24 ENCOUNTER — HOSPITAL ENCOUNTER (OUTPATIENT)
Facility: MEDICAL CENTER | Age: 38
End: 2018-07-24
Attending: FAMILY MEDICINE
Payer: COMMERCIAL

## 2018-07-24 PROCEDURE — 89055 LEUKOCYTE ASSESSMENT FECAL: CPT

## 2018-07-24 PROCEDURE — 87493 C DIFF AMPLIFIED PROBE: CPT

## 2018-07-24 PROCEDURE — 87338 HPYLORI STOOL AG IA: CPT

## 2018-07-25 ENCOUNTER — HOSPITAL ENCOUNTER (OUTPATIENT)
Facility: MEDICAL CENTER | Age: 38
End: 2018-07-25
Attending: FAMILY MEDICINE
Payer: COMMERCIAL

## 2018-07-25 LAB
C DIFF DNA SPEC QL NAA+PROBE: NEGATIVE
C DIFF TOX GENS STL QL NAA+PROBE: NEGATIVE
G LAMBLIA+C PARVUM AG STL QL RAPID: NORMAL
H PYLORI AG STL QL IA: NOT DETECTED
SIGNIFICANT IND 70042: NORMAL
SITE SITE: NORMAL
SOURCE SOURCE: NORMAL
WBC STL QL MICRO: NORMAL

## 2018-07-25 PROCEDURE — 87328 CRYPTOSPORIDIUM AG IA: CPT

## 2018-07-25 PROCEDURE — 87046 STOOL CULTR AEROBIC BACT EA: CPT

## 2018-07-25 PROCEDURE — 87899 AGENT NOS ASSAY W/OPTIC: CPT

## 2018-07-25 PROCEDURE — 87329 GIARDIA AG IA: CPT

## 2018-07-25 PROCEDURE — 87045 FECES CULTURE AEROBIC BACT: CPT

## 2018-07-26 LAB
E COLI SXT1+2 STL IA: NORMAL
SIGNIFICANT IND 70042: NORMAL
SITE SITE: NORMAL
SOURCE SOURCE: NORMAL

## 2018-07-28 LAB
BACTERIA STL CULT: NORMAL
E COLI SXT1+2 STL IA: NORMAL
SIGNIFICANT IND 70042: NORMAL
SITE SITE: NORMAL
SOURCE SOURCE: NORMAL

## 2018-08-01 LAB — SIN NOMBRE VIRUS 93385: NORMAL

## 2018-08-29 ENCOUNTER — HOSPITAL ENCOUNTER (OUTPATIENT)
Dept: RADIOLOGY | Facility: MEDICAL CENTER | Age: 38
End: 2018-08-29
Attending: FAMILY MEDICINE
Payer: COMMERCIAL

## 2018-08-29 DIAGNOSIS — R07.9 CHEST PAIN, UNSPECIFIED TYPE: ICD-10-CM

## 2018-08-29 DIAGNOSIS — M25.561 RIGHT KNEE PAIN, UNSPECIFIED CHRONICITY: ICD-10-CM

## 2018-08-29 PROCEDURE — 73562 X-RAY EXAM OF KNEE 3: CPT | Mod: RT

## 2018-08-29 PROCEDURE — 71046 X-RAY EXAM CHEST 2 VIEWS: CPT

## 2018-09-14 ENCOUNTER — PHYSICAL THERAPY (OUTPATIENT)
Dept: PHYSICAL THERAPY | Facility: REHABILITATION | Age: 38
End: 2018-09-14
Attending: FAMILY MEDICINE
Payer: COMMERCIAL

## 2018-09-14 DIAGNOSIS — M25.561 ACUTE PAIN OF RIGHT KNEE: ICD-10-CM

## 2018-09-14 PROCEDURE — 97110 THERAPEUTIC EXERCISES: CPT

## 2018-09-14 PROCEDURE — 97161 PT EVAL LOW COMPLEX 20 MIN: CPT

## 2018-09-14 ASSESSMENT — ENCOUNTER SYMPTOMS
PAIN SCALE AT LOWEST: 0
PAIN TIMING: WHEN ACTIVE
QUALITY: SHARP
PAIN SCALE AT HIGHEST: 7
PAIN SCALE: 0
ALLEVIATING FACTORS: REST

## 2018-09-18 ENCOUNTER — PHYSICAL THERAPY (OUTPATIENT)
Dept: PHYSICAL THERAPY | Facility: REHABILITATION | Age: 38
End: 2018-09-18
Attending: FAMILY MEDICINE
Payer: COMMERCIAL

## 2018-09-18 ENCOUNTER — HOSPITAL ENCOUNTER (OUTPATIENT)
Dept: LAB | Facility: MEDICAL CENTER | Age: 38
End: 2018-09-18
Payer: COMMERCIAL

## 2018-09-18 DIAGNOSIS — M25.561 ACUTE PAIN OF RIGHT KNEE: ICD-10-CM

## 2018-09-18 LAB
BDY FAT % MEASURED: 13.6 %
BP DIAS: 70 MMHG
BP SYS: 124 MMHG
CHOLEST SERPL-MCNC: 148 MG/DL (ref 100–199)
DIABETES HTDIA: NO
EVENT NAME HTEVT: NORMAL
FASTING HTFAS: YES
GLUCOSE SERPL-MCNC: 93 MG/DL (ref 65–99)
HDLC SERPL-MCNC: 62 MG/DL
HYPERTENSION HTHYP: NO
LDLC SERPL CALC-MCNC: 75 MG/DL
SCREENING LOC CITY HTCIT: NORMAL
SCREENING LOC STATE HTSTA: NORMAL
SCREENING LOCATION HTLOC: NORMAL
SMOKING HTSMO: NO
SUBSCRIBER ID HTSID: NORMAL
TRIGL SERPL-MCNC: 57 MG/DL (ref 0–149)

## 2018-09-18 PROCEDURE — 80061 LIPID PANEL: CPT

## 2018-09-18 PROCEDURE — 97110 THERAPEUTIC EXERCISES: CPT

## 2018-09-18 PROCEDURE — S5190 WELLNESS ASSESSMENT BY NONPH: HCPCS

## 2018-09-18 PROCEDURE — 82947 ASSAY GLUCOSE BLOOD QUANT: CPT

## 2018-09-18 PROCEDURE — 36415 COLL VENOUS BLD VENIPUNCTURE: CPT

## 2018-09-18 NOTE — OP THERAPY DAILY TREATMENT
"  Outpatient Physical Therapy  DAILY TREATMENT     [facility] [address]  [address]  [place] [address]  Phone:  [phone]  Fax:  [phone]    Date: 09/18/2018    Patient: Diego Abarca  YOB: 1980  MRN: [id number]     Time Calculation  Start time: 0806  Stop time: 0832 Time Calculation (min): 26 minutes     Chief Complaint: Knee Problem    Visit #: 2    SUBJECTIVE:  Pt 5 min late due to biometric screen this am.  Knee is \"a little better.\"     OBJECTIVE:      Therapeutic Exercises (CPT 59759):     1. Elliptical, x 3 min, L5, shortened due to running late on time.     2. Prone quad stretch, x 1 min ea    3. Prone TKE, 5\" hold, x 20    4. Side plank, 3x15\" ea    5. Ball HS curl, 2x10    6. Shuttle, Squat: B 8C x 30, SL with wobble board 4C x 20      Time-based treatments/modalities:  Therapeutic exercise minutes (CPT 72959): 26 minutes       ASSESSMENT:   Response to treatment: Decreasing pain levels overall, better tolerance to squatting and getting under homes at work.  No pain in posterior knee with full knee flexion.     PLAN/RECOMMENDATIONS:   Plan for treatment: therapy treatment to continue next visit.  Planned interventions for next visit: continue with current treatment.      "

## 2018-09-20 ENCOUNTER — PHYSICAL THERAPY (OUTPATIENT)
Dept: PHYSICAL THERAPY | Facility: REHABILITATION | Age: 38
End: 2018-09-20
Attending: FAMILY MEDICINE
Payer: COMMERCIAL

## 2018-09-20 DIAGNOSIS — M25.561 ACUTE PAIN OF RIGHT KNEE: ICD-10-CM

## 2018-09-20 PROCEDURE — 97110 THERAPEUTIC EXERCISES: CPT

## 2018-09-20 NOTE — OP THERAPY DAILY TREATMENT
"  Outpatient Physical Therapy  DAILY TREATMENT     [facility] Physical Therapy [address]  [address]  [place] [address]  Phone:  [phone]  Fax:  [phone]    Date: 09/20/2018    Patient: Diego Abarca  YOB: 1980  MRN: [id number]     Time Calculation  Start time: 0806  Stop time: 0832 Time Calculation (min): 26 minutes     Chief Complaint: Knee Problem    Visit #: 3    SUBJECTIVE:  Notes 60-70% overall improvement    OBJECTIVE:      Therapeutic Exercises (CPT 10705):     1. Elliptical, x 3 min, L5, shortened due to running late on time.     2. Ball bridge, x 2 min    3. Standing TKE with ball, 5\" hold, x 20    4. Side plank, 3x15\" ea    5. Ball HS curl, 3x10    6. Air squat with TB around knees, L1 x 20    7. Side stepping with TB, x 3 laps, L1    8. Lunges , with 8# in front rack, x 10 ea side      Time-based treatments/modalities:  Therapeutic exercise minutes (CPT 04248): 26 minutes       ASSESSMENT:   Response to treatment: Improving endurance with posterior chain therex. Still with excessive sway and knee fwd position during squat, improves with cuing from TB.  Unable to tolerate load in squat at this point.      PLAN/RECOMMENDATIONS:   Plan for treatment: therapy treatment to continue next visit.  Planned interventions for next visit: continue with current treatment. Continue to improve squat mechanics, work to tolerating loads for work.  Decreased to 1x/week      "

## 2018-09-28 ENCOUNTER — APPOINTMENT (OUTPATIENT)
Dept: PHYSICAL THERAPY | Facility: REHABILITATION | Age: 38
End: 2018-09-28
Attending: FAMILY MEDICINE
Payer: COMMERCIAL

## 2018-10-03 ENCOUNTER — PHYSICAL THERAPY (OUTPATIENT)
Dept: PHYSICAL THERAPY | Facility: REHABILITATION | Age: 38
End: 2018-10-03
Attending: FAMILY MEDICINE
Payer: COMMERCIAL

## 2018-10-03 DIAGNOSIS — M25.561 ACUTE PAIN OF RIGHT KNEE: ICD-10-CM

## 2018-10-03 PROCEDURE — 97110 THERAPEUTIC EXERCISES: CPT

## 2018-10-03 NOTE — OP THERAPY DAILY TREATMENT
"  Outpatient Physical Therapy  DAILY TREATMENT     [facility] Physical [address]  [address]  [place] [address]  Phone:  [phone]  Fax:  [phone]    Date: 10/03/2018    Patient: Diego Abarca  YOB: 1980  MRN: [id number]     Time Calculation  Start time: 0736  Stop time: 0806 Time Calculation (min): 30 minutes     Chief Complaint: Knee Problem    Visit #: 4    SUBJECTIVE:  Noting 80-85% overall improvement in the knee.  States the biggest difference has been at work, minimal pain.  Does still have some pain in the morning, but works out pretty fast.     OBJECTIVE:        Therapeutic Exercises (CPT 74569):     1. Elliptical, x 3 min, L5    2. Ball bridge, x 2 min, UEs across chest.     3. Standing TKE with ball, 5\" hold, x 20    4. Side plank, 3x15\" ea    5. Ball HS curl, 3x10    6. Goblet squat , 5# x 20    7. SL deadlift, 5# x 10 ea side    8. Lunges , 15# B to sides, x 10 ea side.       Time-based treatments/modalities:  Therapeutic exercise minutes (CPT 51262): 30 minutes       ASSESSMENT:   Response to treatment: Better single leg stability than was expected, but fatigues quickly. 75% of squat reps performed correctly without cuing.     PLAN/RECOMMENDATIONS:   Plan for treatment: therapy treatment to continue next visit.  Planned interventions for next visit: continue with current treatment. Likely to d/c next session.       "

## 2018-10-05 ENCOUNTER — APPOINTMENT (OUTPATIENT)
Dept: PHYSICAL THERAPY | Facility: REHABILITATION | Age: 38
End: 2018-10-05
Attending: FAMILY MEDICINE
Payer: COMMERCIAL

## 2018-10-09 ENCOUNTER — PHYSICAL THERAPY (OUTPATIENT)
Dept: PHYSICAL THERAPY | Facility: REHABILITATION | Age: 38
End: 2018-10-09
Attending: FAMILY MEDICINE
Payer: COMMERCIAL

## 2018-10-09 DIAGNOSIS — M25.561 ACUTE PAIN OF RIGHT KNEE: ICD-10-CM

## 2018-10-09 PROCEDURE — 97110 THERAPEUTIC EXERCISES: CPT

## 2018-10-09 NOTE — OP THERAPY DISCHARGE SUMMARY
Outpatient Physical Therapy  DISCHARGE SUMMARY NOTE      [facility] Physical Therapy [place] [address] [facility].  [address]  [facility] [address]  Phone:  [phone]  Fax:  [phone]    Date of Visit: 10/09/2018    Patient: Diego Abarca  YOB: 1980  MRN: [id number]     Referring Provider: Brooklynn Gonzales D.O.  [address] [place] [address]   Referring Diagnosis Pain in right knee [M25.561]     Physical Therapy Occurrence Codes    Date physical therapy care plan established or reviewed:  9/14/18   Date physical therapy treatment started:  9/14/18          Functional Limitation G-Codes and Severity Modifiers  WOMAC Grand Total: 5.21     Your patient is being discharged from Physical Therapy with the following comments:   · Goals met    Comments:  Mr. Abarca was seen for 5 PT sessions treating his R knee pain.  Treatment focused on improving quad/HS strength, knee stability and re-ed of knee biomechanics.  His response was excellent.  Has now met all goals, shows indep with HEP and perceived level of disability decreased from 56% to 5%.     Limitations Remaining:  None    Recommendations:  D/C to HEP.     Maria Eugenia Miller, PT, DPT    Date: 10/9/2018

## 2018-10-09 NOTE — LETTER
October 9, 2018    Brooklynn Gonzales D.O.  [address] [facility] [address]      Re:  Diego Abarca          Dear Dr. Gonzales,    It was a pleasure seeing your patient, Diego Abarca, on 10/9/2018 for his final therapy visit.     Please find enclosed a copy of the patient's discharge summary from outpatient physical therapy services.          On behalf of the staff at [facility], we would like to thank you for your referrals.  We appreciate your confidence in our clinic and will continue to work hard to provide the best outcomes for your patients.    We sincerely enjoy treating your patients and hope that you have been happy with their care.  Thank you again, and please call with any questions, concerns or ways that we can best meet your needs.        Sincerely,          Maria Eugenia Miller, PT, DPT    No Recipients              Outpatient Physical Therapy  DISCHARGE SUMMARY NOTE      [address]  [address] [address]  Phone:  [phone]  Fax:  [phone]    Date of Visit: 10/09/2018    Patient: Diego Abarca  YOB: 1980  MRN: [id number]     Referring Provider: Brooklynn Gonzales D.O.  [address] [place] [address]   Referring Diagnosis Pain in right knee [M25.561]     Physical Therapy Occurrence Codes    Date physical therapy care plan established or reviewed:  9/14/18   Date physical therapy treatment started:  9/14/18          Functional Limitation G-Codes and Severity Modifiers  WOMAC Grand Total: 5.21     Your patient is being discharged from Physical Therapy with the following comments:   · Goals met    Comments:  Mr. Abarca was seen for 5 PT sessions treating his R knee pain.  Treatment focused on improving quad/HS strength, knee stability and re-ed of knee biomechanics.  His response was excellent.  Has now met all goals, shows indep with HEP and perceived level of disability decreased from 56% to 5%.          Limitations Remaining:  None    Recommendations:  D/C to HEP.     Maria Eugenia Miller, PT, DPT    Date: 10/9/2018

## 2018-10-11 ENCOUNTER — APPOINTMENT (OUTPATIENT)
Dept: PHYSICAL THERAPY | Facility: REHABILITATION | Age: 38
End: 2018-10-11
Attending: FAMILY MEDICINE
Payer: COMMERCIAL

## 2018-10-19 ENCOUNTER — HOSPITAL ENCOUNTER (OUTPATIENT)
Dept: RADIOLOGY | Facility: MEDICAL CENTER | Age: 38
End: 2018-10-19
Attending: FAMILY MEDICINE
Payer: COMMERCIAL

## 2018-10-19 DIAGNOSIS — M54.2 CERVICALGIA: ICD-10-CM

## 2018-10-19 PROCEDURE — 72040 X-RAY EXAM NECK SPINE 2-3 VW: CPT

## 2018-12-19 ENCOUNTER — HOSPITAL ENCOUNTER (OUTPATIENT)
Dept: RADIOLOGY | Facility: MEDICAL CENTER | Age: 38
End: 2018-12-19
Attending: NURSE PRACTITIONER
Payer: COMMERCIAL

## 2018-12-19 DIAGNOSIS — R05.9 COUGH: ICD-10-CM

## 2018-12-19 PROCEDURE — 71046 X-RAY EXAM CHEST 2 VIEWS: CPT

## 2019-01-21 ENCOUNTER — OFFICE VISIT (OUTPATIENT)
Dept: PULMONOLOGY | Facility: HOSPICE | Age: 39
End: 2019-01-21
Payer: COMMERCIAL

## 2019-01-21 VITALS
OXYGEN SATURATION: 96 % | DIASTOLIC BLOOD PRESSURE: 66 MMHG | HEIGHT: 73 IN | RESPIRATION RATE: 16 BRPM | WEIGHT: 193 LBS | SYSTOLIC BLOOD PRESSURE: 118 MMHG | HEART RATE: 71 BPM | TEMPERATURE: 98.1 F | BODY MASS INDEX: 25.58 KG/M2

## 2019-01-21 DIAGNOSIS — J45.20 MILD INTERMITTENT ASTHMA WITHOUT COMPLICATION: ICD-10-CM

## 2019-01-21 DIAGNOSIS — F41.8 ANXIETY ABOUT HEALTH: ICD-10-CM

## 2019-01-21 DIAGNOSIS — Z00.00 HEALTH CARE MAINTENANCE: ICD-10-CM

## 2019-01-21 DIAGNOSIS — Z14.8 ALPHA 1-ANTITRYPSIN PIMS PHENOTYPE: ICD-10-CM

## 2019-01-21 PROCEDURE — 99213 OFFICE O/P EST LOW 20 MIN: CPT | Mod: 25 | Performed by: PHYSICIAN ASSISTANT

## 2019-01-21 PROCEDURE — 90471 IMMUNIZATION ADMIN: CPT | Performed by: PHYSICIAN ASSISTANT

## 2019-01-21 PROCEDURE — 90686 IIV4 VACC NO PRSV 0.5 ML IM: CPT | Performed by: PHYSICIAN ASSISTANT

## 2019-01-21 RX ORDER — ALBUTEROL SULFATE 90 UG/1
2 AEROSOL, METERED RESPIRATORY (INHALATION) EVERY 6 HOURS PRN
Qty: 8.5 G | Refills: 1 | Status: SHIPPED | OUTPATIENT
Start: 2019-01-21 | End: 2019-09-28
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
## 2019-01-21 NOTE — PATIENT INSTRUCTIONS
1-no pulmonary concerns seen today  2-refilled albuterol inhaler  3-follow up with PCP if any additional concerns  4-advised not to smoke recreationally  5-flu shot given

## 2019-04-22 ENCOUNTER — APPOINTMENT (OUTPATIENT)
Dept: RADIOLOGY | Facility: IMAGING CENTER | Age: 39
End: 2019-04-22
Attending: PHYSICIAN ASSISTANT
Payer: COMMERCIAL

## 2019-04-22 ENCOUNTER — OFFICE VISIT (OUTPATIENT)
Dept: URGENT CARE | Facility: CLINIC | Age: 39
End: 2019-04-22
Payer: COMMERCIAL

## 2019-04-22 VITALS
BODY MASS INDEX: 25.73 KG/M2 | TEMPERATURE: 98.9 F | OXYGEN SATURATION: 97 % | HEART RATE: 60 BPM | DIASTOLIC BLOOD PRESSURE: 62 MMHG | WEIGHT: 195 LBS | SYSTOLIC BLOOD PRESSURE: 124 MMHG | RESPIRATION RATE: 16 BRPM

## 2019-04-22 DIAGNOSIS — S20.212A CONTUSION OF RIB ON LEFT SIDE, INITIAL ENCOUNTER: ICD-10-CM

## 2019-04-22 DIAGNOSIS — R07.81 RIB PAIN ON LEFT SIDE: ICD-10-CM

## 2019-04-22 PROCEDURE — 99203 OFFICE O/P NEW LOW 30 MIN: CPT | Mod: 25 | Performed by: PHYSICIAN ASSISTANT

## 2019-04-22 PROCEDURE — 96372 THER/PROPH/DIAG INJ SC/IM: CPT | Performed by: PHYSICIAN ASSISTANT

## 2019-04-22 PROCEDURE — 71101 X-RAY EXAM UNILAT RIBS/CHEST: CPT | Mod: TC,LT | Performed by: PHYSICIAN ASSISTANT

## 2019-04-22 RX ORDER — TRAMADOL HYDROCHLORIDE 50 MG/1
TABLET ORAL
Qty: 20 TAB | Refills: 0 | Status: SHIPPED | OUTPATIENT
Start: 2019-04-22 | End: 2019-05-02

## 2019-04-22 RX ORDER — KETOROLAC TROMETHAMINE 30 MG/ML
60 INJECTION, SOLUTION INTRAMUSCULAR; INTRAVENOUS ONCE
Status: COMPLETED | OUTPATIENT
Start: 2019-04-22 | End: 2019-04-22

## 2019-04-22 RX ADMIN — KETOROLAC TROMETHAMINE 60 MG: 30 INJECTION, SOLUTION INTRAMUSCULAR; INTRAVENOUS at 13:41

## 2019-04-22 ASSESSMENT — ENCOUNTER SYMPTOMS
CONSTITUTIONAL NEGATIVE: 1
COUGH: 0
GASTROINTESTINAL NEGATIVE: 1
CARDIOVASCULAR NEGATIVE: 1
HEMOPTYSIS: 0
SHORTNESS OF BREATH: 0
NEUROLOGICAL NEGATIVE: 1
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
## 2019-09-06 ENCOUNTER — HOSPITAL ENCOUNTER (OUTPATIENT)
Dept: LAB | Facility: MEDICAL CENTER | Age: 39
End: 2019-09-06
Payer: COMMERCIAL

## 2019-09-06 LAB
BDY FAT % MEASURED: 9.2 %
BP DIAS: 73 MMHG
BP SYS: 119 MMHG
CHOLEST SERPL-MCNC: 158 MG/DL (ref 100–199)
DIABETES HTDIA: NO
EVENT NAME HTEVT: NORMAL
FASTING HTFAS: YES
FASTING STATUS PATIENT QL REPORTED: NORMAL
GLUCOSE SERPL-MCNC: 95 MG/DL (ref 65–99)
HDLC SERPL-MCNC: 59 MG/DL
HYPERTENSION HTHYP: NO
LDLC SERPL CALC-MCNC: 85 MG/DL
SCREENING LOC CITY HTCIT: NORMAL
SCREENING LOC STATE HTSTA: NORMAL
SCREENING LOCATION HTLOC: NORMAL
SMOKING HTSMO: NO
SUBSCRIBER ID HTSID: NORMAL
TRIGL SERPL-MCNC: 70 MG/DL (ref 0–149)

## 2019-09-06 PROCEDURE — 80061 LIPID PANEL: CPT

## 2019-09-06 PROCEDURE — S5190 WELLNESS ASSESSMENT BY NONPH: HCPCS

## 2019-09-06 PROCEDURE — 36415 COLL VENOUS BLD VENIPUNCTURE: CPT

## 2019-09-06 PROCEDURE — 82947 ASSAY GLUCOSE BLOOD QUANT: CPT

## 2019-09-28 ENCOUNTER — APPOINTMENT (OUTPATIENT)
Dept: RADIOLOGY | Facility: MEDICAL CENTER | Age: 39
End: 2019-09-28
Attending: EMERGENCY MEDICINE
Payer: COMMERCIAL

## 2019-09-28 ENCOUNTER — HOSPITAL ENCOUNTER (EMERGENCY)
Facility: MEDICAL CENTER | Age: 39
End: 2019-09-28
Attending: EMERGENCY MEDICINE
Payer: COMMERCIAL

## 2019-09-28 VITALS
HEART RATE: 45 BPM | HEIGHT: 74 IN | TEMPERATURE: 98.1 F | WEIGHT: 189.6 LBS | BODY MASS INDEX: 24.33 KG/M2 | OXYGEN SATURATION: 99 % | DIASTOLIC BLOOD PRESSURE: 77 MMHG | SYSTOLIC BLOOD PRESSURE: 114 MMHG | RESPIRATION RATE: 20 BRPM

## 2019-09-28 DIAGNOSIS — F14.10 COCAINE ABUSE (HCC): ICD-10-CM

## 2019-09-28 DIAGNOSIS — R10.9 FLANK PAIN: ICD-10-CM

## 2019-09-28 DIAGNOSIS — F12.10 MARIJUANA ABUSE: ICD-10-CM

## 2019-09-28 LAB
ALBUMIN SERPL BCP-MCNC: 4.1 G/DL (ref 3.2–4.9)
ALBUMIN/GLOB SERPL: 1.8 G/DL
ALP SERPL-CCNC: 58 U/L (ref 30–99)
ALT SERPL-CCNC: 21 U/L (ref 2–50)
AMPHETAMINES UR QL SCN: NEGATIVE
ANION GAP SERPL CALC-SCNC: 11 MMOL/L (ref 0–11.9)
APPEARANCE UR: CLEAR
AST SERPL-CCNC: 22 U/L (ref 12–45)
BARBITURATES UR QL SCN: NEGATIVE
BASOPHILS # BLD AUTO: 1.4 % (ref 0–1.8)
BASOPHILS # BLD: 0.06 K/UL (ref 0–0.12)
BENZODIAZ UR QL SCN: NEGATIVE
BILIRUB SERPL-MCNC: 0.6 MG/DL (ref 0.1–1.5)
BILIRUB UR QL STRIP.AUTO: NEGATIVE
BUN SERPL-MCNC: 15 MG/DL (ref 8–22)
CALCIUM SERPL-MCNC: 8.9 MG/DL (ref 8.4–10.2)
CHLORIDE SERPL-SCNC: 103 MMOL/L (ref 96–112)
CO2 SERPL-SCNC: 25 MMOL/L (ref 20–33)
COCAINE UR QL SCN: POSITIVE
COLOR UR: YELLOW
CREAT SERPL-MCNC: 0.93 MG/DL (ref 0.5–1.4)
EKG IMPRESSION: NORMAL
EOSINOPHIL # BLD AUTO: 0.2 K/UL (ref 0–0.51)
EOSINOPHIL NFR BLD: 4.5 % (ref 0–6.9)
ERYTHROCYTE [DISTWIDTH] IN BLOOD BY AUTOMATED COUNT: 39.8 FL (ref 35.9–50)
GLOBULIN SER CALC-MCNC: 2.3 G/DL (ref 1.9–3.5)
GLUCOSE SERPL-MCNC: 103 MG/DL (ref 65–99)
GLUCOSE UR STRIP.AUTO-MCNC: NEGATIVE MG/DL
HCT VFR BLD AUTO: 43.1 % (ref 42–52)
HGB BLD-MCNC: 14.3 G/DL (ref 14–18)
IMM GRANULOCYTES # BLD AUTO: 0.01 K/UL (ref 0–0.11)
IMM GRANULOCYTES NFR BLD AUTO: 0.2 % (ref 0–0.9)
KETONES UR STRIP.AUTO-MCNC: NEGATIVE MG/DL
LEUKOCYTE ESTERASE UR QL STRIP.AUTO: NEGATIVE
LIPASE SERPL-CCNC: 32 U/L (ref 7–58)
LYMPHOCYTES # BLD AUTO: 1.57 K/UL (ref 1–4.8)
LYMPHOCYTES NFR BLD: 35.7 % (ref 22–41)
MCH RBC QN AUTO: 29.2 PG (ref 27–33)
MCHC RBC AUTO-ENTMCNC: 33.2 G/DL (ref 33.7–35.3)
MCV RBC AUTO: 88.1 FL (ref 81.4–97.8)
MICRO URNS: NORMAL
MONOCYTES # BLD AUTO: 0.58 K/UL (ref 0–0.85)
MONOCYTES NFR BLD AUTO: 13.2 % (ref 0–13.4)
NEUTROPHILS # BLD AUTO: 1.98 K/UL (ref 1.82–7.42)
NEUTROPHILS NFR BLD: 45 % (ref 44–72)
NITRITE UR QL STRIP.AUTO: NEGATIVE
NRBC # BLD AUTO: 0 K/UL
NRBC BLD-RTO: 0 /100 WBC
OPIATES UR QL SCN: NEGATIVE
PCP UR QL SCN: NEGATIVE
PH UR STRIP.AUTO: 7 [PH] (ref 5–8)
PLATELET # BLD AUTO: 268 K/UL (ref 164–446)
PMV BLD AUTO: 8.9 FL (ref 9–12.9)
POTASSIUM SERPL-SCNC: 4.5 MMOL/L (ref 3.6–5.5)
PROT SERPL-MCNC: 6.4 G/DL (ref 6–8.2)
PROT UR QL STRIP: NEGATIVE MG/DL
RBC # BLD AUTO: 4.89 M/UL (ref 4.7–6.1)
RBC UR QL AUTO: NEGATIVE
SODIUM SERPL-SCNC: 139 MMOL/L (ref 135–145)
SP GR UR STRIP.AUTO: 1.01
THC UR QL SCN: POSITIVE
TROPONIN T SERPL-MCNC: <6 NG/L (ref 6–19)
WBC # BLD AUTO: 4.4 K/UL (ref 4.8–10.8)

## 2019-09-28 PROCEDURE — 80053 COMPREHEN METABOLIC PANEL: CPT

## 2019-09-28 PROCEDURE — 74176 CT ABD & PELVIS W/O CONTRAST: CPT

## 2019-09-28 PROCEDURE — 85025 COMPLETE CBC W/AUTO DIFF WBC: CPT

## 2019-09-28 PROCEDURE — 96374 THER/PROPH/DIAG INJ IV PUSH: CPT

## 2019-09-28 PROCEDURE — 83690 ASSAY OF LIPASE: CPT

## 2019-09-28 PROCEDURE — 96375 TX/PRO/DX INJ NEW DRUG ADDON: CPT

## 2019-09-28 PROCEDURE — 700111 HCHG RX REV CODE 636 W/ 250 OVERRIDE (IP): Performed by: EMERGENCY MEDICINE

## 2019-09-28 PROCEDURE — 93005 ELECTROCARDIOGRAM TRACING: CPT | Performed by: EMERGENCY MEDICINE

## 2019-09-28 PROCEDURE — 80305 DRUG TEST PRSMV DIR OPT OBS: CPT

## 2019-09-28 PROCEDURE — 99285 EMERGENCY DEPT VISIT HI MDM: CPT

## 2019-09-28 PROCEDURE — A9270 NON-COVERED ITEM OR SERVICE: HCPCS | Performed by: EMERGENCY MEDICINE

## 2019-09-28 PROCEDURE — 84484 ASSAY OF TROPONIN QUANT: CPT

## 2019-09-28 PROCEDURE — 71046 X-RAY EXAM CHEST 2 VIEWS: CPT

## 2019-09-28 PROCEDURE — 81003 URINALYSIS AUTO W/O SCOPE: CPT

## 2019-09-28 PROCEDURE — 700102 HCHG RX REV CODE 250 W/ 637 OVERRIDE(OP): Performed by: EMERGENCY MEDICINE

## 2019-09-28 RX ORDER — METHOCARBAMOL 750 MG/1
750 TABLET, FILM COATED ORAL 4 TIMES DAILY
Qty: 12 TAB | Refills: 0 | Status: SHIPPED | OUTPATIENT
Start: 2019-09-28 | End: 2019-10-01

## 2019-09-28 RX ORDER — ONDANSETRON 2 MG/ML
4 INJECTION INTRAMUSCULAR; INTRAVENOUS ONCE
Status: COMPLETED | OUTPATIENT
Start: 2019-09-28 | End: 2019-09-28

## 2019-09-28 RX ORDER — METHOCARBAMOL 500 MG/1
750 TABLET, FILM COATED ORAL ONCE
Status: COMPLETED | OUTPATIENT
Start: 2019-09-28 | End: 2019-09-28

## 2019-09-28 RX ORDER — IBUPROFEN 200 MG
800 TABLET ORAL EVERY 8 HOURS PRN
Status: SHIPPED | COMMUNITY
End: 2020-09-28

## 2019-09-28 RX ORDER — KETOROLAC TROMETHAMINE 30 MG/ML
15 INJECTION, SOLUTION INTRAMUSCULAR; INTRAVENOUS ONCE
Status: COMPLETED | OUTPATIENT
Start: 2019-09-28 | End: 2019-09-28

## 2019-09-28 RX ADMIN — METHOCARBAMOL 750 MG: 500 TABLET, FILM COATED ORAL at 08:58

## 2019-09-28 RX ADMIN — KETOROLAC TROMETHAMINE 15 MG: 30 INJECTION, SOLUTION INTRAMUSCULAR; INTRAVENOUS at 08:58

## 2019-09-28 RX ADMIN — ONDANSETRON 4 MG: 2 INJECTION INTRAMUSCULAR; INTRAVENOUS at 08:58

## 2019-09-28 ASSESSMENT — PAIN DESCRIPTION - DESCRIPTORS: DESCRIPTORS: ACHING

## 2019-09-28 NOTE — DISCHARGE INSTRUCTIONS
Stop using cocaine and marijuana!    Return to the ER for any worsening flank pain, changing flank pain, pain with urination, blood in urine, cloudy or foul-smelling urine, abdominal pain, fever, chills, cough, coughing of phlegm or blood, chest pain, nausea, vomiting, or for any concerns.    Use ice to help with the pain.    Follow-up with your primary care physician on Monday.  Please call first thing Monday morning to schedule appointment.

## 2019-09-28 NOTE — ED TRIAGE NOTES
"Presents complaining of bilateral flank pain recurring for the past 5 days, with urinary retention.  He denies any persistent abdominal pain, or associated episodes of N/V; however, he C/O transitory LUQ abdominal discomfort. He admits of consuming daily beer.   Chief Complaint   Patient presents with   • Flank Pain   • LUQ Pain     /73   Pulse 62   Temp 36.5 °C (97.7 °F) (Temporal)   Resp 20   Ht 1.88 m (6' 2\")   Wt 86 kg (189 lb 9.5 oz)   SpO2 98%   BMI 24.34 kg/m²     "

## 2019-09-28 NOTE — ED NOTES
"Toradol, Robaxin, and Zofran FU: pt states no pain \"I'm feeling pretty good right now\", no N/V. NAD awaiting results. Denies need at this time.   "

## 2019-09-28 NOTE — ED PROVIDER NOTES
"ED Provider Note  CHIEF COMPLAINT  Chief Complaint   Patient presents with   • Flank Pain   • LUQ Pain       HPI  Diego Abarca is a 39 y.o. male who presents with complaint of bilateral mid back pain which began 4 days ago.  Pain is been constant unrelenting.  He gets worse when he moves.  He says he has had similar pain on and off over the last several years.  He states it occurs several times a month.  He states typically, however, the pain will go away within 1 to 2 days.  This pain is worse than normal and is lasting longer than normal.  No fevers or chills.  The patient is an .  He states he noticed the pain initially after he twisted 4 days ago.  However, he does not think he injured himself at the time.  The pain does not radiate down the legs.  No numbness, tingling or weakness of extremities.  No loss of bowel or bladder control.  Patient states he feels that he is not \"urinating as much as he should be.\"  No blood in his urine.  No dysuria.  No frequency urgency of urination.  No abdominal pain.  Patient occasionally will take ibuprofen for this pain but he states it \"irritates his stomach.\"  Last time he took any anti-inflammatories was yesterday.  He has not taken anything today.  No rash.  No shortness of breath.  He states the pain gets a little bit worse when he takes a deep breath.  No cough.    REVIEW OF SYSTEMS  See HPI for further details.  Positive for bilateral mid back pain, decrease in urine output.  Negative for fevers, chills, nausea, vomiting, radiating pain in the legs, numbness, tingling, weakness, cough, shortness of breath, abdominal pain, hematuria, dysuria, frequency/urgency of urination.  All other systems are negative.    PAST MEDICAL HISTORY  Past Medical History:   Diagnosis Date   • Alpha 1-antitrypsin PiMS phenotype    • Chickenpox    • Swedish measles    • Mumps        FAMILY HISTORY  History reviewed. No pertinent family history.    SOCIAL HISTORY  Social " "History     Socioeconomic History   • Marital status:      Spouse name: Not on file   • Number of children: Not on file   • Years of education: Not on file   • Highest education level: Not on file   Occupational History   • Not on file   Social Needs   • Financial resource strain: Not on file   • Food insecurity:     Worry: Not on file     Inability: Not on file   • Transportation needs:     Medical: Not on file     Non-medical: Not on file   Tobacco Use   • Smoking status: Never Smoker   • Smokeless tobacco: Never Used   Substance and Sexual Activity   • Alcohol use: Yes     Comment: Weekly   • Drug use: Yes     Types: Inhaled, Marijuana     Comment: marijuana daily   • Sexual activity: Not on file   Lifestyle   • Physical activity:     Days per week: Not on file     Minutes per session: Not on file   • Stress: Not on file   Relationships   • Social connections:     Talks on phone: Not on file     Gets together: Not on file     Attends Buddhism service: Not on file     Active member of club or organization: Not on file     Attends meetings of clubs or organizations: Not on file     Relationship status: Not on file   • Intimate partner violence:     Fear of current or ex partner: Not on file     Emotionally abused: Not on file     Physically abused: Not on file     Forced sexual activity: Not on file   Other Topics Concern   • Not on file   Social History Narrative   • Not on file       SURGICAL HISTORY  Past Surgical History:   Procedure Laterality Date   • OTHER ORTHOPEDIC SURGERY         CURRENT MEDICATIONS  Home Medications    **Home medications have not yet been reviewed for this encounter**         ALLERGIES  No Known Allergies    PHYSICAL EXAM  VITAL SIGNS: /73   Pulse 62   Temp 36.5 °C (97.7 °F) (Temporal)   Resp 20   Ht 1.88 m (6' 2\")   Wt 86 kg (189 lb 9.5 oz)   SpO2 98%   BMI 24.34 kg/m²    Constitutional: Well developed, well nourished; No acute distress; Non-toxic appearance. "   HENT: Normocephalic, atraumatic; Bilateral external ears normal; Oropharynx with moist mucous membranes; No erythema or exudates in the posterior oropharynx.   Eyes: PERRL, EOMI, Conjunctiva normal. No discharge.   Neck:  Supple, nontender midline; No stridor; No nuchal rigidity.   Lymphatic: No cervical lymphadenopathy noted.   Cardiovascular: Regular rate and rhythm without murmurs, rubs, or gallop.   Thorax & Lungs: No respiratory distress, breath sounds clear to auscultation bilaterally without wheezing, rales or rhonchi. Nontender chest wall. No crepitus or subcutaneous air  Abdomen: Soft, nontender, bowel sounds normal. No obvious masses; No pulsatile masses; no rebound, guarding, or peritoneal signs.   Skin: Good color; warm and dry without rash or petechia.  Back: Tender along the posterior and posterior lateral lower rib region and lower latissimus dorsi.  Tender bilateral paraspinous muscles in the lower thoracic and upper lumbar region.  No rash.  No induration.  No warmth.  No erythema.  Certain movements exacerbate the pain.  No CVA tenderness.  Extremities: Distal  dorsalis pedis, posterior tibial pulses are equal bilaterally; No edema; Nontender calves or saphenous, No cyanosis, No clubbing.   Musculoskeletal: Good range of motion in all major joints. No tenderness to palpation or major deformities noted.   Neurologic: Alert & oriented x 4, clear speech, sensation intact light touch bilateral lower extremity's.  Strength are 5 out of 5 and equal testing endorse flexors and plantar flexors.  Normal gait.      EKG  Please see my EKG interpretation below    RADIOLOGY/PROCEDURES  CT-RENAL COLIC EVALUATION(A/P W/O)   Final Result         1.  Single 2 mm stone in the posterior midpole region of the right kidney.      2.  No hydronephrosis or ureterolithiasis bilaterally.      3.  Distended bladder. No bladder stone identified.      DX-CHEST-2 VIEWS   Final Result      Negative two views of the chest.           COURSE & MEDICAL DECISION MAKING  Pertinent Labs & Imaging studies reviewed. (See chart for details)  Results for orders placed or performed during the hospital encounter of 09/28/19   CBC WITH DIFFERENTIAL   Result Value Ref Range    WBC 4.4 (L) 4.8 - 10.8 K/uL    RBC 4.89 4.70 - 6.10 M/uL    Hemoglobin 14.3 14.0 - 18.0 g/dL    Hematocrit 43.1 42.0 - 52.0 %    MCV 88.1 81.4 - 97.8 fL    MCH 29.2 27.0 - 33.0 pg    MCHC 33.2 (L) 33.7 - 35.3 g/dL    RDW 39.8 35.9 - 50.0 fL    Platelet Count 268 164 - 446 K/uL    MPV 8.9 (L) 9.0 - 12.9 fL    Neutrophils-Polys 45.00 44.00 - 72.00 %    Lymphocytes 35.70 22.00 - 41.00 %    Monocytes 13.20 0.00 - 13.40 %    Eosinophils 4.50 0.00 - 6.90 %    Basophils 1.40 0.00 - 1.80 %    Immature Granulocytes 0.20 0.00 - 0.90 %    Nucleated RBC 0.00 /100 WBC    Neutrophils (Absolute) 1.98 1.82 - 7.42 K/uL    Lymphs (Absolute) 1.57 1.00 - 4.80 K/uL    Monos (Absolute) 0.58 0.00 - 0.85 K/uL    Eos (Absolute) 0.20 0.00 - 0.51 K/uL    Baso (Absolute) 0.06 0.00 - 0.12 K/uL    Immature Granulocytes (abs) 0.01 0.00 - 0.11 K/uL    NRBC (Absolute) 0.00 K/uL   COMP METABOLIC PANEL   Result Value Ref Range    Sodium 139 135 - 145 mmol/L    Potassium 4.5 3.6 - 5.5 mmol/L    Chloride 103 96 - 112 mmol/L    Co2 25 20 - 33 mmol/L    Anion Gap 11.0 0.0 - 11.9    Glucose 103 (H) 65 - 99 mg/dL    Bun 15 8 - 22 mg/dL    Creatinine 0.93 0.50 - 1.40 mg/dL    Calcium 8.9 8.4 - 10.2 mg/dL    AST(SGOT) 22 12 - 45 U/L    ALT(SGPT) 21 2 - 50 U/L    Alkaline Phosphatase 58 30 - 99 U/L    Total Bilirubin 0.6 0.1 - 1.5 mg/dL    Albumin 4.1 3.2 - 4.9 g/dL    Total Protein 6.4 6.0 - 8.2 g/dL    Globulin 2.3 1.9 - 3.5 g/dL    A-G Ratio 1.8 g/dL   LIPASE   Result Value Ref Range    Lipase 32 7 - 58 U/L   URINALYSIS,CULTURE IF INDICATED   Result Value Ref Range    Color Yellow     Character Clear     Specific Gravity 1.010 <1.035    Ph 7.0 5.0 - 8.0    Glucose Negative Negative mg/dL    Ketones Negative Negative  mg/dL    Protein Negative Negative mg/dL    Bilirubin Negative Negative    Nitrite Negative Negative    Leukocyte Esterase Negative Negative    Occult Blood Negative Negative    Micro Urine Req see below    UR DRUG SCREEN([facility] ONLY)   Result Value Ref Range    Phencyclidine -Pcp Negative Negative    Benzodiazepines Negative Negative    Cocaine Metabolite Positive (A) Negative    Amphetamines By Triage Negative Negative    Urine THC Positive (A) Negative    Codeine-Morphine Negative Negative    Barbiturates Negative Negative   ESTIMATED GFR   Result Value Ref Range    GFR If African American >60 >60 mL/min/1.73 m 2    GFR If Non African American >60 >60 mL/min/1.73 m 2   TROPONIN   Result Value Ref Range    Troponin T <6 6 - 19 ng/L   EKG (NOW)   Result Value Ref Range    Report       [facility]    Test Date:  2019  Pt Name:    THU EASTON                 Department: EDS  MRN:        [id number]                      [facility]  Gender:     Male                         Technician: [id number]  :        1980                   Requested By:HEIDE STEINER  Order #:    [id number]                    Reading MD: Heide Steiner    Measurements  Intervals                                Axis  Rate:       36                           P:          69  NV:         187                          QRS:        83  QRSD:       113                          T:          70  QT:         507  QTc:        393    Interpretive Statements  Sinus bradycardia rate 36 (old records reviewed reveal pt normally with HR in    40's)  Normal axis  Normal intervals  Probable left ventricular hypertrophy  ST elev, probable normal early repol pattern  No ST depression  Baseline wander in lead(s) V5  No previous ECG available for comparison    Electronicall y Signed On 2019 11:14:01 PDT by Heide Steiner         Patient presents to the ER complaining of bilateral flank/mid back pain which is been  constant over the last 4 days.  Patient states that he recalls twisting before onset of pain.  He states he was getting ready to go to work at the time.  Since then the pain is been persistent.  Pain got worse with certain movements and position changes.  No falls, trauma or other injury.  It does not get any better with Advil.  His last dose of Advil was yesterday.  He was given a dose of Toradol and some Robaxin here today with significant improvement.  Patient was concerned that the pain was coming from his kidneys.  He told me that he felt like he was having a hard time urinating.  Patient denies dysuria, hematuria and frequency/urgency of urination.  No cloudy or foul-smelling urine.  Urinalysis is negative for any signs of infection.  No blood in his urine.  With respect to his complaint of not being able to urinate as much recently, bladder scan revealed 275 cc in the bladder.  After bladder scan patient was able to void 450.  He is clearly able to empty his bladder.  CT scan revealed a slightly distended bladder, but patient voided 450 cc after the CT scan and likely completely decompressed his bladder.  CT scan does not show any evidence of hydronephrosis or hydroureter.  His renal function is normal.  White count is normal.  Electro lites are normal.  He has not had any abdominal pain or tenderness other than his typical left upper quadrant pain when he takes Advil.  Patient's tox screen came back positive for cocaine and marijuana.  Given his cocaine positive tox screen I added an EKG and a cardiac enzyme to his work-up.  EKG shows sinus bradycardia but otherwise unremarkable other than what looks to be some early repolarization diffusely throughout all leads.  No reciprocal changes.  Patient's heart rate is been in the 40s.  Patient states this is normal for him.  Review of old records reveal that heart rate has been in the mid 40-60 range during several other ER visits and office visits as noted in  epic.  Troponin after 4 days of constant pain is negative.  At this time no evidence or concern for ACS.  Patient is well-appearing.  Is not in any distress.  He is comfortably sitting on the gurney surfing his cell phone.  He is feeling better with the Robaxin and Toradol.  He says he can now twist his torso without having any discomfort in his flank/mid back.  No rating pain down the legs.  No numbness, tingling weakness of extremities.  His neurologic exam is normal.  No concern for cord compression or cauda equina syndrome.  Patient was counseled to stop using cocaine.  He is also been counseled to stop using marijuana as he has alpha-1 antitrypsin disease and is followed by pulmonology.  Pulmonology is also encouraged him to stop smoking weed but he has yet to do so.  Patient's vitals are normal and stable.  Is not in any distress.  I think he has a musculoskeletal cause of his flank pain/mid back pain.  I have encouraged him to follow-up with his primary care physician so that he can get hooked up with physical therapy as this may be helpful to him.  I will send him home with some Robaxin.  He is to use ice on the area of discomfort.  He should avoid heat.  He has been given strict return precautions and discharge instructions and he understands treatment plan and follow-up.    FINAL IMPRESSION  1. Flank pain Acute    2. Cocaine abuse (HCC) Acute    3. Marijuana abuse Acute           This dictation has been created using voice recognition software. The accuracy of the dictation is limited by the abilities of the software. I expect there may be some errors of grammar and possibly content. I made every attempt to manually correct the errors within my dictation. However, errors related to voice recognition software may still exist and should be interpreted within the appropriate context.  Electronically signed by: Leny Steiner, 9/28/2019 8:35 AM

## 2019-11-29 ENCOUNTER — OFFICE VISIT (OUTPATIENT)
Dept: URGENT CARE | Facility: CLINIC | Age: 39
End: 2019-11-29
Payer: COMMERCIAL

## 2019-11-29 VITALS
SYSTOLIC BLOOD PRESSURE: 122 MMHG | HEIGHT: 73 IN | WEIGHT: 196 LBS | RESPIRATION RATE: 16 BRPM | OXYGEN SATURATION: 98 % | BODY MASS INDEX: 25.98 KG/M2 | TEMPERATURE: 97.8 F | DIASTOLIC BLOOD PRESSURE: 78 MMHG | HEART RATE: 60 BPM

## 2019-11-29 DIAGNOSIS — H01.9 EYELID DERMATITIS, INFECTIOUS: ICD-10-CM

## 2019-11-29 PROCEDURE — 99214 OFFICE O/P EST MOD 30 MIN: CPT | Performed by: NURSE PRACTITIONER

## 2019-11-29 RX ORDER — AMOXICILLIN AND CLAVULANATE POTASSIUM 875; 125 MG/1; MG/1
1 TABLET, FILM COATED ORAL 2 TIMES DAILY
Qty: 14 TAB | Refills: 0 | Status: SHIPPED | OUTPATIENT
Start: 2019-11-29 | End: 2020-05-06

## 2019-11-29 RX ORDER — PREDNISONE 20 MG/1
TABLET ORAL
Qty: 6 TAB | Refills: 0 | Status: SHIPPED | OUTPATIENT
Start: 2019-11-29 | End: 2020-05-06

## 2019-11-29 ASSESSMENT — ENCOUNTER SYMPTOMS
MYALGIAS: 0
EYE PAIN: 0
PHOTOPHOBIA: 0
BLURRED VISION: 0
DOUBLE VISION: 0
EYE DISCHARGE: 0
HEADACHES: 0
EYE REDNESS: 0
FEVER: 0
CHILLS: 0

## 2019-11-30 NOTE — PROGRESS NOTES
Subjective:      Diego Abarca is a 39 y.o. male who presents with Eye Problem (x 1 wk, pain and redness on Rt. upper eyelid)            HPI New. 39 year old male with redness and pain of right upper eyelid for one week. He denies any fever, chills, pain with eye movement or discharge. He has no myalgia, visual changes or redness of eyeball itself. Has not done any treatment for this issue.    Patient has no known allergies.  Current Outpatient Medications on File Prior to Visit   Medication Sig Dispense Refill   • ibuprofen (MOTRIN) 200 MG Tab Take 800 mg by mouth every 8 hours as needed.       No current facility-administered medications on file prior to visit.      Social History     Socioeconomic History   • Marital status:      Spouse name: Not on file   • Number of children: Not on file   • Years of education: Not on file   • Highest education level: Not on file   Occupational History   • Not on file   Social Needs   • Financial resource strain: Not on file   • Food insecurity:     Worry: Not on file     Inability: Not on file   • Transportation needs:     Medical: Not on file     Non-medical: Not on file   Tobacco Use   • Smoking status: Never Smoker   • Smokeless tobacco: Never Used   Substance and Sexual Activity   • Alcohol use: Yes     Comment: Weekly   • Drug use: Yes     Types: Inhaled, Marijuana     Comment: marijuana daily   • Sexual activity: Not on file   Lifestyle   • Physical activity:     Days per week: Not on file     Minutes per session: Not on file   • Stress: Not on file   Relationships   • Social connections:     Talks on phone: Not on file     Gets together: Not on file     Attends Quaker service: Not on file     Active member of club or organization: Not on file     Attends meetings of clubs or organizations: Not on file     Relationship status: Not on file   • Intimate partner violence:     Fear of current or ex partner: Not on file     Emotionally abused: Not on file      "Physically abused: Not on file     Forced sexual activity: Not on file   Other Topics Concern   • Not on file   Social History Narrative   • Not on file     Breast Cancer-related family history is not on file.      Review of Systems   Constitutional: Negative for chills and fever.   Eyes: Negative for blurred vision, double vision, photophobia, pain, discharge and redness.   Musculoskeletal: Negative for myalgias.   Skin: Positive for itching and rash.        Upper eyelid, right.   Neurological: Negative for headaches.          Objective:     /78 (BP Location: Left arm, Patient Position: Sitting, BP Cuff Size: Large adult)   Pulse 60   Temp 36.6 °C (97.8 °F) (Temporal)   Resp 16   Ht 1.854 m (6' 1\")   Wt 88.9 kg (196 lb)   SpO2 98%   BMI 25.86 kg/m²      Physical Exam  Constitutional:       General: He is not in acute distress.     Appearance: He is well-developed.   HENT:      Head: Normocephalic and atraumatic.      Right Ear: Ear canal and external ear normal. No middle ear effusion. Tympanic membrane is not injected or perforated.      Left Ear: Ear canal and external ear normal.  No middle ear effusion. Tympanic membrane is not injected or perforated.      Nose: Mucosal edema present. No congestion or rhinorrhea.      Mouth/Throat:      Pharynx: No oropharyngeal exudate or posterior oropharyngeal erythema.   Eyes:      General:         Right eye: No discharge.         Left eye: No discharge.      Conjunctiva/sclera: Conjunctivae normal.   Neck:      Musculoskeletal: Normal range of motion and neck supple.   Cardiovascular:      Rate and Rhythm: Normal rate and regular rhythm.      Heart sounds: Normal heart sounds. No murmur.   Pulmonary:      Effort: Pulmonary effort is normal. No respiratory distress.      Breath sounds: Normal breath sounds.   Musculoskeletal: Normal range of motion.      Comments: Normal movement of all 4 extremities.   Lymphadenopathy:      Cervical: No cervical adenopathy.    "   Upper Body:      Right upper body: No supraclavicular adenopathy.      Left upper body: No supraclavicular adenopathy.   Skin:     General: Skin is warm and dry.      Comments: Mild erythema and tenderness with some flaking of skin noted on right upper eyelid.   Neurological:      Mental Status: He is alert and oriented to person, place, and time.      Gait: Gait normal.   Psychiatric:         Behavior: Behavior normal.         Thought Content: Thought content normal.                 Assessment/Plan:       1. Eyelid dermatitis, infectious  amoxicillin-clavulanate (AUGMENTIN) 875-125 MG Tab    predniSONE (DELTASONE) 20 MG Tab     Differential diagnosis, natural history, supportive care, and indications for immediate follow-up discussed at length.

## 2019-12-07 ENCOUNTER — OFFICE VISIT (OUTPATIENT)
Dept: URGENT CARE | Facility: CLINIC | Age: 39
End: 2019-12-07
Payer: COMMERCIAL

## 2019-12-07 VITALS
SYSTOLIC BLOOD PRESSURE: 122 MMHG | WEIGHT: 189 LBS | DIASTOLIC BLOOD PRESSURE: 74 MMHG | BODY MASS INDEX: 24.26 KG/M2 | RESPIRATION RATE: 14 BRPM | TEMPERATURE: 98.6 F | HEART RATE: 78 BPM | HEIGHT: 74 IN | OXYGEN SATURATION: 97 %

## 2019-12-07 DIAGNOSIS — J10.1 INFLUENZA A: ICD-10-CM

## 2019-12-07 DIAGNOSIS — R05.9 COUGH: ICD-10-CM

## 2019-12-07 LAB
FLUAV+FLUBV AG SPEC QL IA: NORMAL
INT CON NEG: NORMAL
INT CON POS: NORMAL

## 2019-12-07 PROCEDURE — 99214 OFFICE O/P EST MOD 30 MIN: CPT | Performed by: PHYSICIAN ASSISTANT

## 2019-12-07 PROCEDURE — 87804 INFLUENZA ASSAY W/OPTIC: CPT | Performed by: PHYSICIAN ASSISTANT

## 2019-12-07 RX ORDER — PROMETHAZINE HYDROCHLORIDE AND CODEINE PHOSPHATE 6.25; 1 MG/5ML; MG/5ML
5 SYRUP ORAL EVERY 12 HOURS PRN
Qty: 60 ML | Refills: 0 | Status: SHIPPED | OUTPATIENT
Start: 2019-12-07 | End: 2019-12-14

## 2019-12-07 RX ORDER — OSELTAMIVIR PHOSPHATE 75 MG/1
75 CAPSULE ORAL 2 TIMES DAILY
Qty: 10 CAP | Refills: 0 | Status: SHIPPED | OUTPATIENT
Start: 2019-12-07 | End: 2020-05-06

## 2019-12-07 ASSESSMENT — ENCOUNTER SYMPTOMS
VOMITING: 0
SORE THROAT: 0
COUGH: 1
SHORTNESS OF BREATH: 0
DIARRHEA: 0
CHILLS: 1
FEVER: 1
SPUTUM PRODUCTION: 0
WHEEZING: 0
NAUSEA: 1
MYALGIAS: 1
ABDOMINAL PAIN: 0

## 2019-12-08 NOTE — PROGRESS NOTES
"Subjective:   Diego Abarca  is a 39 y.o. male who presents for Cough (cough, cold sweats, lathargic, felt \"feverish\" x 3 days)        Cough   This is a new problem. The current episode started in the past 7 days. Associated symptoms include chills, a fever and myalgias. Pertinent negatives include no rash, sore throat, shortness of breath or wheezing.     Patient with a complaint of last 2 to 3 days of noting subjective fevers chills noting cough.  Has had significant body aches associated with cough.  Denies vomiting but noted mild nausea with fever.  Denies abdominal pain diarrhea or rash.  Denies particular sore throat or ear pain but feels sinus congestion and significant body aches causing general fatigue.  Denies history of bronchitis or pneumonia.  Denies history of asthma.  Has been treated with fever reducers.  Notes has a son at home he is finishing chemotherapy.    Review of Systems   Constitutional: Positive for chills, fever and malaise/fatigue.   HENT: Positive for congestion. Negative for sore throat.    Respiratory: Positive for cough. Negative for sputum production, shortness of breath and wheezing.    Gastrointestinal: Positive for nausea. Negative for abdominal pain, diarrhea and vomiting.   Musculoskeletal: Positive for myalgias.   Skin: Negative for rash.     No Known Allergies   I have worn a mask for the entire encounter with this patient.      Objective:   /74 (BP Location: Left arm, Patient Position: Sitting, BP Cuff Size: Adult)   Pulse 78   Temp 37 °C (98.6 °F) (Temporal)   Resp 14   Ht 1.88 m (6' 2\")   Wt 85.7 kg (189 lb)   SpO2 97%   BMI 24.27 kg/m²   Physical Exam  Vitals signs and nursing note reviewed.   Constitutional:       General: He is not in acute distress.     Appearance: He is well-developed. He is not diaphoretic.   HENT:      Head: Normocephalic and atraumatic.      Right Ear: Tympanic membrane, ear canal and external ear normal.      Left Ear: Tympanic " membrane, ear canal and external ear normal.      Nose: Nose normal.      Mouth/Throat:      Pharynx: Uvula midline. Posterior oropharyngeal erythema ( mild PND) present. No oropharyngeal exudate.      Tonsils: No tonsillar abscesses.   Eyes:      General: No scleral icterus.        Right eye: No discharge.         Left eye: No discharge.      Conjunctiva/sclera: Conjunctivae normal.   Neck:      Musculoskeletal: Neck supple.   Pulmonary:      Effort: Pulmonary effort is normal. No respiratory distress.      Breath sounds: No decreased breath sounds, wheezing, rhonchi or rales.   Musculoskeletal: Normal range of motion.   Lymphadenopathy:      Cervical: Cervical adenopathy ( mild bilat) present.   Skin:     General: Skin is warm and dry.      Coloration: Skin is not pale.   Neurological:      Mental Status: He is alert and oriented to person, place, and time.      Coordination: Coordination normal.       POCT flu = POS A  Assessment/Plan:   1. Influenza A  - oseltamivir (TAMIFLU) 75 MG Cap; Take 1 Cap by mouth 2 times a day.  Dispense: 10 Cap; Refill: 0  - POCT Influenza A/B    2. Cough  - promethazine-codeine (PHENERGAN-CODEINE) 6.25-10 MG/5ML Syrup; Take 5 mL by mouth every 12 hours as needed for up to 7 days.  Dispense: 60 mL; Refill: 0  Supportive care is reviewed with patient/caregiver - recommend to push PO fluids and electrolytes, we discussed typical influenza supportive care treatment including over-the-counter fever reducers and pushing fluids, additionally patient sent with Tamiflu due to son with chemotherapy at home and any hopes of reducing transmission risk, cautioned for sedation with cough syrup  Return to clinic with lack of resolution or progression of symptoms.    Differential diagnosis, natural history, supportive care, and indications for immediate follow-up discussed.

## 2020-03-19 ENCOUNTER — HOSPITAL ENCOUNTER (OUTPATIENT)
Dept: LAB | Facility: MEDICAL CENTER | Age: 40
End: 2020-03-19
Attending: PHYSICIAN ASSISTANT
Payer: COMMERCIAL

## 2020-03-19 LAB — URATE SERPL-MCNC: 8.6 MG/DL (ref 2.5–8.3)

## 2020-03-19 PROCEDURE — 36415 COLL VENOUS BLD VENIPUNCTURE: CPT

## 2020-03-19 PROCEDURE — 84550 ASSAY OF BLOOD/URIC ACID: CPT

## 2020-05-06 ENCOUNTER — OFFICE VISIT (OUTPATIENT)
Dept: URGENT CARE | Facility: CLINIC | Age: 40
End: 2020-05-06
Payer: COMMERCIAL

## 2020-05-06 VITALS
SYSTOLIC BLOOD PRESSURE: 122 MMHG | BODY MASS INDEX: 25.66 KG/M2 | TEMPERATURE: 98.2 F | OXYGEN SATURATION: 100 % | RESPIRATION RATE: 16 BRPM | HEART RATE: 82 BPM | DIASTOLIC BLOOD PRESSURE: 66 MMHG | WEIGHT: 193.6 LBS | HEIGHT: 73 IN

## 2020-05-06 DIAGNOSIS — L23.89 ALLERGIC CONTACT DERMATITIS DUE TO OTHER AGENTS: Primary | ICD-10-CM

## 2020-05-06 PROCEDURE — 99214 OFFICE O/P EST MOD 30 MIN: CPT | Performed by: PHYSICIAN ASSISTANT

## 2020-05-06 RX ORDER — METHYLPREDNISOLONE 4 MG/1
TABLET ORAL
Qty: 21 TAB | Refills: 0 | Status: SHIPPED | OUTPATIENT
Start: 2020-05-06 | End: 2020-05-18

## 2020-05-06 RX ORDER — CEPHALEXIN 500 MG/1
500 CAPSULE ORAL 4 TIMES DAILY
Qty: 12 CAP | Refills: 0 | Status: SHIPPED | OUTPATIENT
Start: 2020-05-06 | End: 2020-05-09

## 2020-05-06 ASSESSMENT — FIBROSIS 4 INDEX: FIB4 SCORE: 0.7

## 2020-05-06 NOTE — PROGRESS NOTES
Subjective:      Pt is a 40 y.o. male who presents with Rash (x1wk)          HPI  This is a new problem. Pt notes new onset red, itching rash on face x 7 days after wearing face mask x 2 days in a row. /Pt has not taken any Rx medications for this condition. Pt denies new detergents, soaps, make-up, hygiene products, medications, foods, exposure to chemicals.  Pt states the pain is a 1/10, aching in nature and worse at night. Pt denies CP, SOB, NVD, paresthesias, headaches, dizziness, change in vision, hives, or other joint pain. The pt's medication list, problem list, and allergies have been evaluated and reviewed during today's visit.    PMH:  Past Medical History:   Diagnosis Date   • Alpha 1-antitrypsin PiMS phenotype    • Chickenpox    • Arabic measles    • Mumps        PSH:  Past Surgical History:   Procedure Laterality Date   • OTHER ORTHOPEDIC SURGERY         Fam Hx:  the patient's family history is not pertinent to their current complaint    Family Status   Relation Name Status   • Mo  Alive   • Fa         Soc HX:  Social History     Socioeconomic History   • Marital status:      Spouse name: Not on file   • Number of children: Not on file   • Years of education: Not on file   • Highest education level: Not on file   Occupational History   • Not on file   Social Needs   • Financial resource strain: Not on file   • Food insecurity     Worry: Not on file     Inability: Not on file   • Transportation needs     Medical: Not on file     Non-medical: Not on file   Tobacco Use   • Smoking status: Never Smoker   • Smokeless tobacco: Never Used   Substance and Sexual Activity   • Alcohol use: Yes     Comment: Weekly   • Drug use: Yes     Types: Inhaled, Marijuana     Comment: marijuana daily   • Sexual activity: Not on file   Lifestyle   • Physical activity     Days per week: Not on file     Minutes per session: Not on file   • Stress: Not on file   Relationships   • Social connections     Talks on  "phone: Not on file     Gets together: Not on file     Attends Yarsanism service: Not on file     Active member of club or organization: Not on file     Attends meetings of clubs or organizations: Not on file     Relationship status: Not on file   • Intimate partner violence     Fear of current or ex partner: Not on file     Emotionally abused: Not on file     Physically abused: Not on file     Forced sexual activity: Not on file   Other Topics Concern   • Not on file   Social History Narrative   • Not on file         Medications:    Current Outpatient Medications:   •  methylPREDNISolone (MEDROL DOSEPAK) 4 MG Tablet Therapy Pack, Follow schedule on package instructions., Disp: 21 Tab, Rfl: 0  •  cephALEXin (KEFLEX) 500 MG Cap, Take 1 Cap by mouth 4 times a day for 3 days., Disp: 12 Cap, Rfl: 0  •  ibuprofen (MOTRIN) 200 MG Tab, Take 800 mg by mouth every 8 hours as needed., Disp: , Rfl:       Allergies:  Patient has no known allergies.    ROS    Constitutional: Negative for fever, chills and malaise/fatigue.   HENT: Negative for congestion and sore throat.    Eyes: Negative for blurred vision, double vision and photophobia.   Respiratory: Negative for cough and shortness of breath.  Cardiovascular: Negative for chest pain and palpitations.   Gastrointestinal: Negative for heartburn, nausea, vomiting, abdominal pain, diarrhea and constipation.   Genitourinary: Negative for dysuria and flank pain.   Musculoskeletal: Negative for joint pain and myalgias.   Skin: POS for itching and rash.   Neurological: Negative for dizziness, tingling and headaches.   Endo/Heme/Allergies: Does not bruise/bleed easily.   Psychiatric/Behavioral: Negative for depression. The patient is not nervous/anxious.         Objective:     /66   Pulse 82   Temp 36.8 °C (98.2 °F) (Temporal)   Resp 16   Ht 1.854 m (6' 1\")   Wt 87.8 kg (193 lb 9.6 oz)   SpO2 100%   BMI 25.54 kg/m²      Physical Exam  HENT:      Head: Normocephalic. No " raccoon eyes, Painting's sign, abrasion, contusion, masses, right periorbital erythema, left periorbital erythema or laceration. Hair is normal.      Jaw: There is normal jaw occlusion.             Constitutional: PT is oriented to person, place, and time. PT appears well-developed and well-nourished. No distress.   HENT:   Mouth/Throat: Oropharynx is clear and moist. No oropharyngeal exudate.   Eyes: Conjunctivae normal and EOM are normal. Pupils are equal, round, and reactive to light.   Neck: Normal range of motion. Neck supple. No thyromegaly present.   Cardiovascular: Normal rate, regular rhythm, normal heart sounds and intact distal pulses.  Exam reveals no gallop and no friction rub.    No murmur heard.  Pulmonary/Chest: Effort normal and breath sounds normal. No respiratory distress. PT has no wheezes. PT has no rales. Pt exhibits no tenderness.   Abdominal: Soft. Bowel sounds are normal. PT exhibits no distension and no mass. There is no tenderness. There is no rebound and no guarding.   Musculoskeletal: Normal range of motion. PT exhibits no edema and no tenderness.   Neurological: PT is alert and oriented to person, place, and time. PT has normal reflexes. No cranial nerve deficit.   Skin: Skin is warm and dry. No rash noted. PT is not diaphoretic. SEE HEAD      Psychiatric: PT has a normal mood and affect. PT behavior is normal. Judgment and thought content normal.          Assessment/Plan:       1. Allergic contact dermatitis due to other agents    - methylPREDNISolone (MEDROL DOSEPAK) 4 MG Tablet Therapy Pack; Follow schedule on package instructions.  Dispense: 21 Tab; Refill: 0  - cephALEXin (KEFLEX) 500 MG Cap; Take 1 Cap by mouth 4 times a day for 3 days.  Dispense: 12 Cap; Refill: 0      Rest, fluids encouraged.  Attempt to find or use cloth mask  AVS with medical info given.  Pt was in full understanding and agreement with the plan.  Differential diagnosis, natural history, supportive care, and  indications for immediate follow-up discussed. All questions answered. Patient agrees with the plan of care.  Follow-up as needed if symptoms worsen or fail to improve to PCP, Urgent care or Emergency Room.

## 2020-05-11 ENCOUNTER — TELEPHONE (OUTPATIENT)
Dept: SCHEDULING | Facility: IMAGING CENTER | Age: 40
End: 2020-05-11

## 2020-05-12 ENCOUNTER — TELEPHONE (OUTPATIENT)
Dept: SCHEDULING | Facility: IMAGING CENTER | Age: 40
End: 2020-05-12

## 2020-05-15 ENCOUNTER — TELEPHONE (OUTPATIENT)
Dept: MEDICAL GROUP | Facility: PHYSICIAN GROUP | Age: 40
End: 2020-05-15

## 2020-05-15 ENCOUNTER — APPOINTMENT (OUTPATIENT)
Dept: MEDICAL GROUP | Facility: MEDICAL CENTER | Age: 40
End: 2020-05-15
Payer: COMMERCIAL

## 2020-05-18 ENCOUNTER — OFFICE VISIT (OUTPATIENT)
Dept: MEDICAL GROUP | Facility: PHYSICIAN GROUP | Age: 40
End: 2020-05-18
Payer: COMMERCIAL

## 2020-05-18 VITALS
DIASTOLIC BLOOD PRESSURE: 70 MMHG | TEMPERATURE: 97.9 F | HEART RATE: 95 BPM | WEIGHT: 198 LBS | SYSTOLIC BLOOD PRESSURE: 126 MMHG | BODY MASS INDEX: 26.24 KG/M2 | RESPIRATION RATE: 12 BRPM | OXYGEN SATURATION: 97 % | HEIGHT: 73 IN

## 2020-05-18 DIAGNOSIS — M25.521 RIGHT ELBOW PAIN: ICD-10-CM

## 2020-05-18 DIAGNOSIS — M10.9 ACUTE GOUT, UNSPECIFIED CAUSE, UNSPECIFIED SITE: ICD-10-CM

## 2020-05-18 DIAGNOSIS — L72.9 CYST OF SKIN: Primary | ICD-10-CM

## 2020-05-18 DIAGNOSIS — J45.20 MILD INTERMITTENT ASTHMA WITHOUT COMPLICATION: ICD-10-CM

## 2020-05-18 PROBLEM — J45.909 ASTHMA: Status: ACTIVE | Noted: 2020-05-18

## 2020-05-18 PROBLEM — R10.9 ABDOMINAL PAIN: Status: RESOLVED | Noted: 2017-01-04 | Resolved: 2020-05-18

## 2020-05-18 PROCEDURE — 99204 OFFICE O/P NEW MOD 45 MIN: CPT | Performed by: FAMILY MEDICINE

## 2020-05-18 RX ORDER — ALBUTEROL SULFATE 90 UG/1
2 AEROSOL, METERED RESPIRATORY (INHALATION) EVERY 4 HOURS PRN
Qty: 1 INHALER | Refills: 2 | Status: SHIPPED | OUTPATIENT
Start: 2020-05-18 | End: 2021-04-19

## 2020-05-18 ASSESSMENT — PATIENT HEALTH QUESTIONNAIRE - PHQ9: CLINICAL INTERPRETATION OF PHQ2 SCORE: 0

## 2020-05-18 ASSESSMENT — FIBROSIS 4 INDEX: FIB4 SCORE: 0.7

## 2020-05-18 NOTE — ASSESSMENT & PLAN NOTE
This is a chronic condition. He was diagnosed about a month ago when he had an attack in his left big toe. Resolved now. His uric acid level was elevated.

## 2020-05-18 NOTE — ASSESSMENT & PLAN NOTE
This is a new condition.   Onset: few years  Location: medial right elbow  Quality: shooting pain  Duration: minute max then self-resolves  Trigger: only when resting an elbow or moving on hard surface  Associated symptoms: no tingling  Home treatments: none

## 2020-05-18 NOTE — LETTER
[facility]  Xiomara Montague M.D.  [address]  [facility] [address]  Fax: [phone]   Authorization for Release/Disclosure of   Protected Health Information   Name: DIEGO EASTON : 1980 SSN: [id number]   Address: [address] [place] [address] Phone:    [phone] (home)    I authorize the entity listed below to release/disclose the PHI below to:   [facility]/Xiomara Montague M.D. and Xiomara Montague M.D.   Provider or Entity Name:  [facility]   Address   City, State, Zip   Phone:      Fax:     Reason for request: continuity of care   Information to be released:    [  ] LAST COLONOSCOPY,  including any PATH REPORT and follow-up  [  ] LAST FIT/COLOGUARD RESULT [  ] LAST DEXA  [  ] LAST MAMMOGRAM  [  ] LAST PAP  [  ] LAST LABS [  ] RETINA EXAM REPORT  [  ] IMMUNIZATION RECORDS  [X] Release all info      [  ] Check here and initial the line next to each item to release ALL health information INCLUDING  _____ Care and treatment for drug and / or alcohol abuse  _____ HIV testing, infection status, or AIDS  _____ Genetic Testing    DATES OF SERVICE OR TIME PERIOD TO BE DISCLOSED: _____________  I understand and acknowledge that:  * This Authorization may be revoked at any time by you in writing, except if your health information has already been used or disclosed.  * Your health information that will be used or disclosed as a result of you signing this authorization could be re-disclosed by the recipient. If this occurs, your re-disclosed health information may no longer be protected by State or Federal laws.  * You may refuse to sign this Authorization. Your refusal will not affect your ability to obtain treatment.  * This Authorization becomes effective upon signing and will  on (date) __________.      If no date is indicated, this Authorization will  one (1) year from the signature date.    Name: Diego Easton    Signature:   Date:     2020            PLEASE FAX REQUESTED RECORDS BACK TO: [phone]

## 2020-05-18 NOTE — PROGRESS NOTES
Subjective:     CC:  Diagnoses of Cyst of skin, Mild intermittent asthma without complication, Right elbow pain, and Acute gout, unspecified cause, unspecified site were pertinent to this visit.    HISTORY OF THE PRESENT ILLNESS: Patient is a 40 y.o. male. This pleasant patient is here today to establish care and discuss multiple concerns. His prior PCP was Brooklynn Gonzales DO.    Cyst of skin  This is a chronic condition. For the last few months he has had a cyst of the lateral right ankle. IT would come and go so he saw ILYA. ILYA has drained it 3 times and it keeps coming back. The plan is to get an MRI to figure out how to remove it.     Asthma  This is a chronic condition. He has had asthma for about 10 years. He states he will get some wheezing and will use albuterol it helps. Only needs the inhaler every couple of months. He doesn't get symptoms with exercise and seems to be more related to allergies. He does need a refill.    Right elbow pain  This is a new condition.   Onset: few years  Location: medial right elbow  Quality: shooting pain  Duration: minute max then self-resolves  Trigger: only when resting an elbow or moving on hard surface  Associated symptoms: no tingling  Home treatments: none    Gout  This is a chronic condition. He was diagnosed about a month ago when he had an attack in his left big toe. Resolved now. His uric acid level was elevated.       Allergies: Patient has no known allergies.    Current Outpatient Medications Ordered in Epic   Medication Sig Dispense Refill   • albuterol 108 (90 Base) MCG/ACT Aero Soln inhalation aerosol Inhale 2 Puffs by mouth every four hours as needed for Shortness of Breath. 1 Inhaler 2   • ibuprofen (MOTRIN) 200 MG Tab Take 800 mg by mouth every 8 hours as needed.       No current Epic-ordered facility-administered medications on file.        Past Medical History:   Diagnosis Date   • Alpha 1-antitrypsin PiMS phenotype    • Chickenpox    • [place] measles    •  "Mumps        Past Surgical History:   Procedure Laterality Date   • OTHER ORTHOPEDIC SURGERY Left 2002    knee       Social History     Tobacco Use   • Smoking status: Never Smoker   • Smokeless tobacco: Never Used   Substance Use Topics   • Alcohol use: Yes     Alcohol/week: 8.4 oz     Types: 14 Cans of beer per week   • Drug use: Yes     Types: Inhaled, Marijuana     Comment: marijuana daily       Social History     Social History Narrative   • Not on file       Family History   Problem Relation Age of Onset   • No Known Problems Mother    • Diabetes Maternal Grandfather    • Heart Disease Maternal Grandfather    • Stroke Other    • Cancer Child         leukemia type B   • Drug abuse Neg Hx    • Alcohol abuse Neg Hx        Health Maintenance: Requesting records    ROS:   Gen: no fevers/chills, no changes in weight  Eyes: no changes in vision  ENT: no hearing loss  Pulm: no cough  CV: no palpitations  GI: no diarrhea  : no dysuria  MSk: no myalgias  Skin: no rash  Neuro: no numbness/tingling      Objective:     Exam: /70 (BP Location: Left arm, Patient Position: Sitting, BP Cuff Size: Adult)   Pulse 95   Temp 36.6 °C (97.9 °F) (Temporal)   Resp 12   Ht 1.854 m (6' 1\")   Wt 89.8 kg (198 lb)   SpO2 97%  Body mass index is 26.12 kg/m².    General: Normal appearing. No distress.  HEENT: Normocephalic. Eyes conjunctiva clear lids without ptosis, pupils equal and reactive to light accommodation, ears normal shape and contour, canals are clear bilaterally, tympanic membranes are benign, oropharynx is without erythema, edema or exudates.   Neck: Supple without JVD. Thyroid is not enlarged.  Pulmonary: Clear to ausculation.  Normal effort. No rales, ronchi, or wheezing.  Cardiovascular: Regular rate and rhythm without murmur. Carotid and radial pulses are intact and equal bilaterally.  Abdomen: Soft, nontender, nondistended. Normal bowel sounds. Liver and spleen are not palpable  Neurologic: Grossly " nonfocal  Lymph: No cervical or supraclavicular lymph nodes are palpable  Skin: Warm and dry.  No obvious lesions.  Musculoskeletal: Normal gait. No extremity cyanosis, clubbing, or edema.  R Elbow: No deformity, swelling or bruising noted. Tenderness to palpation on medial epicondyle. Full range of motion bilaterally. 5/5 strength bilaterally.  Psych: Normal mood and affect. Alert and oriented x3. Judgment and insight is normal.    Assessment & Plan:   40 y.o. male with the following -    1. Cyst of skin  This is a chronic condition, unchanged.  He states for last few months has had a cyst on the lateral right ankle.  It would come and go so he saw ILYA.  He reports ILYA drained 3 times and it kept returning so the plan is to get an MRI to figure out how to remove it.  -Continue to follow with ILYA    2. Mild intermittent asthma without complication  This is a chronic condition, stable.  He states he has had asthma for approximately 10 years.  He will get some wheezing and he will use albuterol as needed which helps.  He reports that he only requires the inhaler once every couple of months.  He does not get symptoms with exercise and is he has been more related to allergies.  He is requesting a refill of the albuterol today which I provided.    3. Right elbow pain  This is a new condition.  He states for the last few years he has had pain in the medial right elbow but only when he rests it on a hard surface or if it is resting on the hard surface and he chooses to move or use the elbow.  He will get a shooting pain that lasts for about a minute and then it self resolves.  No associated tingling and no home treatments.  On exam his medial epicondyle is tender and testing wrist extension against resistance causes pain in the area where he feels it so I am suspecting a medial epicondylitis.  I did offer physical therapy but he states his wife is a physical therapist they will ask for her help first.  He will let me know  if he wants a referral for physical therapy.    4. Acute gout, unspecified cause, unspecified site  This is an acute condition, resolved.  He states a month ago he was diagnosed with gout because he had a flare in his left big toe and in 1 of the toes of his right foot.  He states it was red, hot, throbbing, and any brush of material over the toe was extremely painful.  He reports he was given medication but never used it as he never heard back if it is truly gout or not.  His uric acid level was elevated.  He denies any symptoms currently.  We did discuss options of chronic uric acid level suppression versus treating as needed for flares and he has opted for treating as needed for flares at this time.    Return in about 2 weeks (around 6/1/2020) for GERD.    Please note that this dictation was created using voice recognition software. I have made every reasonable attempt to correct obvious errors, but I expect that there are errors of grammar and possibly content that I did not discover before finalizing the note.

## 2020-05-18 NOTE — ASSESSMENT & PLAN NOTE
This is a chronic condition. For the last few months he has had a cyst of the lateral right ankle. IT would come and go so he saw ILYA. ILYA has drained it 3 times and it keeps coming back. The plan is to get an MRI to figure out how to remove it.

## 2020-05-18 NOTE — ASSESSMENT & PLAN NOTE
This is a chronic condition. He has had asthma for about 10 years. He states he will get some wheezing and will use albuterol it helps. Only needs the inhaler every couple of months. He doesn't get symptoms with exercise and seems to be more related to allergies. He does need a refill.

## 2020-06-08 ENCOUNTER — OFFICE VISIT (OUTPATIENT)
Dept: MEDICAL GROUP | Facility: PHYSICIAN GROUP | Age: 40
End: 2020-06-08
Payer: COMMERCIAL

## 2020-06-08 VITALS
DIASTOLIC BLOOD PRESSURE: 56 MMHG | WEIGHT: 196.2 LBS | RESPIRATION RATE: 16 BRPM | BODY MASS INDEX: 26 KG/M2 | HEIGHT: 73 IN | OXYGEN SATURATION: 96 % | TEMPERATURE: 97.9 F | SYSTOLIC BLOOD PRESSURE: 106 MMHG | HEART RATE: 60 BPM

## 2020-06-08 DIAGNOSIS — K21.00 GASTROESOPHAGEAL REFLUX DISEASE WITH ESOPHAGITIS: Primary | ICD-10-CM

## 2020-06-08 DIAGNOSIS — Z23 NEED FOR VACCINATION: ICD-10-CM

## 2020-06-08 PROBLEM — K21.9 ACID REFLUX: Status: ACTIVE | Noted: 2020-06-08

## 2020-06-08 PROCEDURE — 90732 PPSV23 VACC 2 YRS+ SUBQ/IM: CPT | Performed by: FAMILY MEDICINE

## 2020-06-08 PROCEDURE — 90471 IMMUNIZATION ADMIN: CPT | Performed by: FAMILY MEDICINE

## 2020-06-08 PROCEDURE — 99214 OFFICE O/P EST MOD 30 MIN: CPT | Mod: 25 | Performed by: FAMILY MEDICINE

## 2020-06-08 RX ORDER — FAMOTIDINE 20 MG/1
20 TABLET, FILM COATED ORAL 2 TIMES DAILY
Qty: 180 TAB | Refills: 1 | Status: SHIPPED | OUTPATIENT
Start: 2020-06-08 | End: 2020-09-08

## 2020-06-08 ASSESSMENT — FIBROSIS 4 INDEX: FIB4 SCORE: 0.7

## 2020-06-08 NOTE — ASSESSMENT & PLAN NOTE
This is a new condition. For the last few years he will get intermittent symptoms of burning in the throat and sometimes dryness. He hasn't noticed any food triggers. He does drink a couple sodas a day and a couple beers a day.  Symptoms are worse in the morning when first wake up. He does eat within 2-3 hours of bedtime. He has been previously treated with omeprazole - didn't work very well, zantac - worked better.

## 2020-06-08 NOTE — PROGRESS NOTES
"Subjective:     CC: acid reflux    HPI:   Diego presents today with     Acid reflux  This is a new condition. For the last few years he will get intermittent symptoms of burning in the throat and sometimes dryness. He hasn't noticed any food triggers. He does drink a couple sodas a day and a couple beers a day.  Symptoms are worse in the morning when first wake up. He does eat within 2-3 hours of bedtime. He has been previously treated with omeprazole - didn't work very well, zantac - worked better.       Past Medical History:   Diagnosis Date   • Alpha 1-antitrypsin PiMS phenotype    • Chickenpox    • [place] measles    • Mumps        Social History     Tobacco Use   • Smoking status: Never Smoker   • Smokeless tobacco: Never Used   Substance Use Topics   • Alcohol use: Yes     Alcohol/week: 8.4 oz     Types: 14 Cans of beer per week   • Drug use: Yes     Types: Inhaled, Marijuana     Comment: marijuana daily       Current Outpatient Medications Ordered in Epic   Medication Sig Dispense Refill   • famotidine (PEPCID) 20 MG Tab Take 1 Tab by mouth 2 times a day. 180 Tab 1   • albuterol 108 (90 Base) MCG/ACT Aero Soln inhalation aerosol Inhale 2 Puffs by mouth every four hours as needed for Shortness of Breath. 1 Inhaler 2   • ibuprofen (MOTRIN) 200 MG Tab Take 800 mg by mouth every 8 hours as needed.       No current Epic-ordered facility-administered medications on file.        Allergies:  Patient has no known allergies.    Health Maintenance: Completed    ROS:  Gen: no fevers/chills  Pulm: no sob  CV: no chest pain      Objective:     Exam:  /56 (BP Location: Right arm, Patient Position: Sitting, BP Cuff Size: Adult)   Pulse 60   Temp 36.6 °C (97.9 °F) (Temporal)   Resp 16   Ht 1.854 m (6' 1\")   Wt 89 kg (196 lb 3.2 oz)   SpO2 96%   BMI 25.89 kg/m²  Body mass index is 25.89 kg/m².    Gen: Alert and oriented, No apparent distress.  Neck: Neck is supple without lymphadenopathy.  Lungs: Normal effort, " CTA bilaterally, no wheezes, rhonchi, or rales  CV: Regular rate and rhythm. No murmurs, rubs, or gallops.  Ext: No clubbing, cyanosis, edema.    Assessment & Plan:     40 y.o. male with the following -     1. Gastroesophageal reflux disease with esophagitis  This is a chronic condition, new to me.  He states for the last years has had intermittent symptoms of burning in the throat and sometimes a dry sensation in the throat.  He states he is to have overall really acidic feeling but was placed on omeprazole which only somewhat helped but then placed on ranitidine which worked better.  He is not on any medications currently does not get really bad acidic symptoms but still gets some throat symptoms.  He does drink a couple sodas a day and a couple of beers a day.  We did discuss caffeine and alcohol can worsen the symptoms.  He does eat within 2 to 3 hours of bedtime I did recommend that he try to eat earlier as it could worsen symptoms as well.  Additionally, he thinks he may been told he had Dickson's esophagus in the past so we placed a referral to GI to see if he needs any surveillance.  In the meantime we will restart medication as it can help with some of the throat symptoms.  Especially with his possible history of Dickson's he needs to be on chronic acid suppression anyway.  -Start famotidine 20 mg  - REFERRAL TO GASTROENTEROLOGY    2. Need for vaccination  - Pneumococal Polysaccharide Vaccine 23-Valent =>1YO SQ/IM      Return in about 6 months (around 12/8/2020) for Annual/wellness visit.    Please note that this dictation was created using voice recognition software. I have made every reasonable attempt to correct obvious errors, but I expect that there are errors of grammar and possibly content that I did not discover before finalizing the note.

## 2020-06-11 ENCOUNTER — APPOINTMENT (OUTPATIENT)
Dept: RADIOLOGY | Facility: MEDICAL CENTER | Age: 40
End: 2020-06-11
Attending: NURSE PRACTITIONER
Payer: COMMERCIAL

## 2020-06-11 ENCOUNTER — HOSPITAL ENCOUNTER (OUTPATIENT)
Dept: RADIOLOGY | Facility: MEDICAL CENTER | Age: 40
End: 2020-06-11
Attending: PHYSICIAN ASSISTANT
Payer: COMMERCIAL

## 2020-06-11 DIAGNOSIS — M25.561 RIGHT KNEE PAIN, UNSPECIFIED CHRONICITY: ICD-10-CM

## 2020-06-11 PROCEDURE — 73721 MRI JNT OF LWR EXTRE W/O DYE: CPT | Mod: RT

## 2020-06-12 ENCOUNTER — HOSPITAL ENCOUNTER (OUTPATIENT)
Dept: RADIOLOGY | Facility: MEDICAL CENTER | Age: 40
End: 2020-06-12
Attending: FAMILY MEDICINE
Payer: COMMERCIAL

## 2020-06-12 ENCOUNTER — OFFICE VISIT (OUTPATIENT)
Dept: MEDICAL GROUP | Facility: PHYSICIAN GROUP | Age: 40
End: 2020-06-12
Payer: COMMERCIAL

## 2020-06-12 VITALS
WEIGHT: 192.2 LBS | BODY MASS INDEX: 25.47 KG/M2 | HEIGHT: 73 IN | RESPIRATION RATE: 16 BRPM | TEMPERATURE: 97.8 F | HEART RATE: 52 BPM | SYSTOLIC BLOOD PRESSURE: 110 MMHG | OXYGEN SATURATION: 98 % | DIASTOLIC BLOOD PRESSURE: 66 MMHG

## 2020-06-12 DIAGNOSIS — M54.6 CHRONIC MIDLINE THORACIC BACK PAIN: ICD-10-CM

## 2020-06-12 DIAGNOSIS — M54.6 CHRONIC MIDLINE THORACIC BACK PAIN: Primary | ICD-10-CM

## 2020-06-12 DIAGNOSIS — R21 RASH: ICD-10-CM

## 2020-06-12 DIAGNOSIS — G89.29 CHRONIC MIDLINE THORACIC BACK PAIN: ICD-10-CM

## 2020-06-12 DIAGNOSIS — G89.29 CHRONIC MIDLINE THORACIC BACK PAIN: Primary | ICD-10-CM

## 2020-06-12 DIAGNOSIS — S61.411A LACERATION OF RIGHT HAND WITHOUT FOREIGN BODY, INITIAL ENCOUNTER: ICD-10-CM

## 2020-06-12 PROBLEM — M54.2 NECK PAIN: Status: ACTIVE | Noted: 2020-06-12

## 2020-06-12 PROCEDURE — 99214 OFFICE O/P EST MOD 30 MIN: CPT | Performed by: FAMILY MEDICINE

## 2020-06-12 PROCEDURE — 72074 X-RAY EXAM THORAC SPINE4/>VW: CPT

## 2020-06-12 RX ORDER — TRIAMCINOLONE ACETONIDE 0.25 MG/G
CREAM TOPICAL
Qty: 15 G | Refills: 0 | Status: SHIPPED | OUTPATIENT
Start: 2020-06-12 | End: 2020-09-28

## 2020-06-12 RX ORDER — MELOXICAM 15 MG/1
15 TABLET ORAL
COMMUNITY
Start: 2020-06-03 | End: 2020-09-08

## 2020-06-12 RX ORDER — CYCLOBENZAPRINE HCL 5 MG
5-10 TABLET ORAL 3 TIMES DAILY PRN
Qty: 30 TAB | Refills: 0 | Status: SHIPPED | OUTPATIENT
Start: 2020-06-12 | End: 2020-09-28

## 2020-06-12 RX ORDER — TRAMADOL HYDROCHLORIDE 50 MG/1
TABLET ORAL
COMMUNITY
Start: 2020-06-03 | End: 2020-09-08

## 2020-06-12 ASSESSMENT — FIBROSIS 4 INDEX: FIB4 SCORE: 0.7

## 2020-06-12 NOTE — PROGRESS NOTES
Subjective:     CC: neck pain    HPI:   Diego presents today with     Thoracic back pain  This is a new condition.  Onset: last night  Location: C7-T1 pain  Duration: constant  Quality: stiff/stuck, severe pain  Precipitating trauma: injured while snowboarding a few years ago  Associated symptoms: no numbness/tingling in hands/fingers,  Home treatments: tramadol - not touching it    He has never been to physical therapy.       Rash  This is a new condition.  Onset: 2-3 days  Duration: intermittent  Location: face, both cheeks  Quality: red bumps  Associated symptoms: +itchy, +irritated, +hot  Home treatments: none    Seen by urgent care on 5/6/2020 - thought contact dermatitis and treated with abx + steroid    Laceration of right hand without foreign body  This is a new condition. Yesterday got laceration of hand after breaking window. Seen at [facility] where x-ray was taken and then sutures. 3 sutures and plan to remove in ~2 weeks.      Past Medical History:   Diagnosis Date   • Alpha 1-antitrypsin PiMS phenotype    • Chickenpox    • Kyrgy[place] measles    • Mumps        Social History     Tobacco Use   • Smoking status: Never Smoker   • Smokeless tobacco: Never Used   Substance Use Topics   • Alcohol use: Yes     Alcohol/week: 8.4 oz     Types: 14 Cans of beer per week   • Drug use: Yes     Types: Inhaled, Marijuana     Comment: marijuana daily       Current Outpatient Medications Ordered in Epic   Medication Sig Dispense Refill   • meloxicam (MOBIC) 15 MG tablet Take 15 mg by mouth every day.     • cyclobenzaprine (FLEXERIL) 5 MG tablet Take 1-2 Tabs by mouth 3 times a day as needed. 30 Tab 0   • triamcinolone acetonide (KENALOG) 0.025 % Cream Apply thin layer to affected area(s) twice daily for NO MORE THAN 7 DAYS 15 g 0   • famotidine (PEPCID) 20 MG Tab Take 1 Tab by mouth 2 times a day. 180 Tab 1   • albuterol 108 (90 Base) MCG/ACT Aero Soln inhalation aerosol Inhale 2 Puffs by mouth every four hours as needed  "for Shortness of Breath. 1 Inhaler 2   • ibuprofen (MOTRIN) 200 MG Tab Take 800 mg by mouth every 8 hours as needed.     • tramadol (ULTRAM) 50 MG Tab TAKE 1 TABLET BY MOUTH EVERY 6 HOURS AS NEEDED FOR PAIN FOR 5 DAYS       No current [facility]-ordered facility-administered medications on file.        Allergies:  Patient has no known allergies.    Health Maintenance: Completed    ROS:  Gen: no fevers/chills  Pulm: no sob  CV: no chest pain    Objective:     Exam:  /66 (BP Location: Left arm, Patient Position: Sitting, BP Cuff Size: Adult)   Pulse (!) 52   Temp 36.6 °C (97.8 °F) (Temporal)   Resp 16   Ht 1.854 m (6' 1\")   Wt 87.2 kg (192 lb 3.2 oz)   SpO2 98%   BMI 25.36 kg/m²  Body mass index is 25.36 kg/m².    Gen: Alert and oriented, No apparent distress.  Neck: Neck with limited extension and rotation to the right. Non-tender to palpation and without lymphadenopathy.  Lungs: Normal effort, CTA bilaterally, no wheezes, rhonchi, or rales  CV: Regular rate and rhythm. No murmurs, rubs, or gallops.  Ext: No clubbing, cyanosis, edema.  Back: +TTP over T2 spinous process. No TTP over paraspinals.    Assessment & Plan:     40 y.o. male with the following -     1. Chronic midline thoracic back pain  I at the end of the visit that the patient s a chronic condition, acutely exacerbated.  He states a few years ago he had an injury while snowboarding that injured his upper back/neck.  Periodically since then he will get pain in his upper back around the T2 level with the inability to move his neck at all.  It occurred last night and he tried to take a tramadol but it did not touch the pain at all.  Today on exam he has limited range of motion with neck extension and rotation to the right.  No tenderness over the neck itself it is tender right over the T2 spinous process but nontender in the paraspinals in the thoracic region.  Since he is getting stiffness and inability to move the neck I think he benefit from " physical therapy.  However, since he is tender directly over spinous process and he did have an injury a few years ago we will get an x-ray to evaluate that further.  - REFERRAL TO PHYSICAL THERAPY Reason for Therapy: Eval/Treat/Report  - DX-THORACIC SPINE-WITH OBLIQUES 4+; Future    2. Rash  This is a new condition.  For last 2-3 days has had a red bumpy rash on both of his cheeks on his face.  There is some itching and irritation as well as a hot feeling associated.  He was seen by urgent care 5/6/2020 and they thought was a contact in the eye as they treated him with antibiotics and steroids.  He stated that help with and it came back if he does think is related to his mask as he puts a layer of another type of fabric between his face and the mask he does not get the rash.  Therefore, we will do a refill topical steroid and see if that resolves the rash.  - triamcinolone acetonide (KENALOG) 0.025 % Cream; Apply thin layer to affected area(s) twice daily for NO MORE THAN 7 DAYS  Dispense: 15 g; Refill: 0    3. Laceration of right hand without foreign body, initial encounter  This is a new condition.  Yesterday while knocking his hand onto a window the glass shattered and he got a laceration in his hand.  He was seen by [facility] the got an x-ray and found that there was no glass or foreign objects in the wound so they did suture close.  He is going to return in 2 weeks for suture removal.      Return if symptoms worsen or fail to improve.    Please note that this dictation was created using voice recognition software. I have made every reasonable attempt to correct obvious errors, but I expect that there are errors of grammar and possibly content that I did not discover before finalizing the note.

## 2020-06-15 ENCOUNTER — TELEPHONE (OUTPATIENT)
Dept: MEDICAL GROUP | Facility: PHYSICIAN GROUP | Age: 40
End: 2020-06-15

## 2020-06-15 DIAGNOSIS — G89.29 CHRONIC MIDLINE THORACIC BACK PAIN: ICD-10-CM

## 2020-06-15 DIAGNOSIS — M54.6 CHRONIC MIDLINE THORACIC BACK PAIN: ICD-10-CM

## 2020-06-15 NOTE — TELEPHONE ENCOUNTER
----- Message from Xiomara Montague M.D. sent at 6/15/2020  8:23 AM PDT -----  Your recent x-ray showed a slight curvature of your upper thoracic spine indicating some slight scoliosis.  There is no evidence of any fractures or old fractures.  Possible scoliosis is the reason you are having pain.

## 2020-06-15 NOTE — TELEPHONE ENCOUNTER
At this I have no other testing to do. There isn't much treatment I can provide other than physical therapy. He could also see sports medicine if he is interested. If he is, he just needs to let me know so I can place the referral.

## 2020-06-15 NOTE — TELEPHONE ENCOUNTER
Phone Number Called: [phone] (home)       Call outcome: Spoke to patient regarding message below.    Message: Pt informed, Pt understood, Pt wanted to know what can be done?  What follow up is needed, or will there be other test required?  What treatment options are available?      Please advise

## 2020-06-16 NOTE — TELEPHONE ENCOUNTER
A referral for sports medicine has been placed. He will hear from the referral office once it has been authorized.

## 2020-06-16 NOTE — TELEPHONE ENCOUNTER
Phone Number Called: [phone] (home)       Call outcome: Spoke to patient regarding message below.    Message: Pt informed, Pt would like for you to send in the referral to Sports Medicine.

## 2020-06-25 ENCOUNTER — HOSPITAL ENCOUNTER (OUTPATIENT)
Dept: RADIOLOGY | Facility: MEDICAL CENTER | Age: 40
End: 2020-06-25
Attending: NURSE PRACTITIONER
Payer: COMMERCIAL

## 2020-06-25 DIAGNOSIS — M67.471 GANGLION, RIGHT ANKLE AND FOOT: ICD-10-CM

## 2020-06-25 DIAGNOSIS — M25.571 RIGHT ANKLE PAIN, UNSPECIFIED CHRONICITY: ICD-10-CM

## 2020-06-25 PROCEDURE — 700117 HCHG RX CONTRAST REV CODE 255: Performed by: NURSE PRACTITIONER

## 2020-06-25 PROCEDURE — 73723 MRI JOINT LWR EXTR W/O&W/DYE: CPT | Mod: RT

## 2020-06-25 PROCEDURE — A9576 INJ PROHANCE MULTIPACK: HCPCS | Performed by: NURSE PRACTITIONER

## 2020-06-25 RX ADMIN — GADOTERIDOL 20 ML: 279.3 INJECTION, SOLUTION INTRAVENOUS at 10:15

## 2020-09-02 ENCOUNTER — TELEPHONE (OUTPATIENT)
Dept: MEDICAL GROUP | Facility: PHYSICIAN GROUP | Age: 40
End: 2020-09-02

## 2020-09-02 NOTE — TELEPHONE ENCOUNTER
Phone Number Called: [phone] (home)     Call outcome: Did not leave a detailed message. Requested patient to call back.    Message: LVM asking patient to call back in regards to referral request.

## 2020-09-02 NOTE — TELEPHONE ENCOUNTER
What ankle problem is he having? We've never discussed an ankle issue. I need to attach a diagnosis for a referral.

## 2020-09-02 NOTE — TELEPHONE ENCOUNTER
VOICEMAIL  1. Caller Name: Elisa - wife               Call Back Number: [phone] (home)       2. Message: Requesting referral for right ankle to [facility].  States will only see him for his back as that's what referral DX was for.    3. Patient approves office to leave a detailed voicemail/MyChart message: yes

## 2020-09-08 ENCOUNTER — OFFICE VISIT (OUTPATIENT)
Dept: MEDICAL GROUP | Facility: PHYSICIAN GROUP | Age: 40
End: 2020-09-08
Payer: COMMERCIAL

## 2020-09-08 VITALS
RESPIRATION RATE: 16 BRPM | SYSTOLIC BLOOD PRESSURE: 112 MMHG | WEIGHT: 195.4 LBS | HEIGHT: 73 IN | BODY MASS INDEX: 25.9 KG/M2 | OXYGEN SATURATION: 97 % | DIASTOLIC BLOOD PRESSURE: 78 MMHG | TEMPERATURE: 97.9 F | HEART RATE: 64 BPM

## 2020-09-08 DIAGNOSIS — L72.9 CYST OF SKIN: Primary | ICD-10-CM

## 2020-09-08 DIAGNOSIS — M79.645 PAIN OF FINGER OF LEFT HAND: ICD-10-CM

## 2020-09-08 PROBLEM — S61.411A LACERATION OF RIGHT HAND WITHOUT FOREIGN BODY: Status: RESOLVED | Noted: 2020-06-12 | Resolved: 2020-09-08

## 2020-09-08 PROCEDURE — 99214 OFFICE O/P EST MOD 30 MIN: CPT | Performed by: FAMILY MEDICINE

## 2020-09-08 RX ORDER — OMEPRAZOLE 20 MG/1
20 CAPSULE, DELAYED RELEASE ORAL DAILY
COMMUNITY
End: 2021-04-19 | Stop reason: SDUPTHER

## 2020-09-08 ASSESSMENT — FIBROSIS 4 INDEX: FIB4 SCORE: 0.7

## 2020-09-08 NOTE — ASSESSMENT & PLAN NOTE
This is a new condition.  Onset: 3 weeks ago  Location: left ring DIP  Duration: constant  Quality: dull ache most of time, sharp if bumped  Modifying factors: worse - bumping it, using DIP; better - nothing  Precipitating trauma: none he can recall  Associated symptoms: +swelling  Home treatments: nothing    He states it has been re-occurring issue. It will resolve on its own but generally less than 3 weeks.

## 2020-09-08 NOTE — ASSESSMENT & PLAN NOTE
This is a chronic condition. For the last few months he has had a cyst of the lateral right ankle. It would come and go so he saw [facility]. [facility] has drained it 3 times and it keeps coming back. It is starting to get more painful and he would like to get it removed. However with [facility] it is going to cost $6000 out of pocket and he would like a 2nd opinion, especially because he was told they'll have to cut through a ligament to remove the cyst.

## 2020-09-16 ENCOUNTER — APPOINTMENT (OUTPATIENT)
Dept: MEDICAL GROUP | Facility: PHYSICIAN GROUP | Age: 40
End: 2020-09-16
Payer: COMMERCIAL

## 2020-09-28 ENCOUNTER — OFFICE VISIT (OUTPATIENT)
Dept: MEDICAL GROUP | Facility: PHYSICIAN GROUP | Age: 40
End: 2020-09-28
Payer: COMMERCIAL

## 2020-09-28 VITALS
BODY MASS INDEX: 26.69 KG/M2 | HEIGHT: 73 IN | TEMPERATURE: 98.2 F | SYSTOLIC BLOOD PRESSURE: 120 MMHG | OXYGEN SATURATION: 97 % | WEIGHT: 201.4 LBS | DIASTOLIC BLOOD PRESSURE: 82 MMHG | HEART RATE: 68 BPM | RESPIRATION RATE: 16 BRPM

## 2020-09-28 DIAGNOSIS — L30.1 DYSHIDROTIC ECZEMA: ICD-10-CM

## 2020-09-28 PROCEDURE — 99214 OFFICE O/P EST MOD 30 MIN: CPT | Performed by: FAMILY MEDICINE

## 2020-09-28 RX ORDER — BETAMETHASONE DIPROPIONATE 0.5 MG/G
CREAM TOPICAL
Qty: 45 G | Refills: 0 | Status: SHIPPED | OUTPATIENT
Start: 2020-09-28 | End: 2020-10-15

## 2020-09-28 ASSESSMENT — FIBROSIS 4 INDEX: FIB4 SCORE: 0.7

## 2020-09-28 NOTE — ASSESSMENT & PLAN NOTE
This is a new condition.  Onset: 2 weeks  Location: both hands, palmar surface and intertriginous  Quality: pinkish-red circles, extreme drying of skin, feels thin  Associated symptoms: +pain    Previously has been given steroids for this same problem and it was helpful.

## 2020-09-28 NOTE — PROGRESS NOTES
"Subjective:     CC: rash    HPI:   Diego presents today with     Dyshidrotic eczema  This is a new condition.  Onset: 2 weeks  Location: both hands, palmar surface and intertriginous  Quality: pinkish-red circles, extreme drying of skin, feels thin  Associated symptoms: +pain    Previously has been given steroids for this same problem and it was helpful.      Past Medical History:   Diagnosis Date   • Alpha 1-antitrypsin PiMS phenotype    • Chickenpox    • Swedish measles    • Mumps        Social History     Tobacco Use   • Smoking status: Never Smoker   • Smokeless tobacco: Never Used   Substance Use Topics   • Alcohol use: Yes     Alcohol/week: 8.4 oz     Types: 14 Cans of beer per week   • Drug use: Yes     Types: Inhaled, Marijuana     Comment: marijuana daily       Current Outpatient Medications Ordered in Epic   Medication Sig Dispense Refill   • betamethasone dipropionate 0.05 % Cream Apply thin layer to affected area(s) twice daily for 2-4 weeks. Stop after resolution. 45 g 0   • omeprazole (PRILOSEC) 20 MG delayed-release capsule Take 20 mg by mouth every day.     • albuterol 108 (90 Base) MCG/ACT Aero Soln inhalation aerosol Inhale 2 Puffs by mouth every four hours as needed for Shortness of Breath. 1 Inhaler 2   • triamcinolone acetonide (KENALOG) 0.025 % Cream Apply thin layer to affected area(s) twice daily for NO MORE THAN 7 DAYS (Patient not taking: Reported on 9/28/2020) 15 g 0     No current Epic-ordered facility-administered medications on file.        Allergies:  Patient has no known allergies.    Health Maintenance: Completed    ROS:  Gen: no fevers/chills  Pulm: no sob  CV: no chest pain    Objective:     Exam:  /82 (BP Location: Right arm, Patient Position: Sitting, BP Cuff Size: Adult)   Pulse 68   Temp 36.8 °C (98.2 °F) (Temporal)   Resp 16   Ht 1.854 m (6' 1\")   Wt 91.4 kg (201 lb 6.4 oz)   SpO2 97%   BMI 26.57 kg/m²  Body mass index is 26.57 kg/m².    Gen: Alert and oriented, " No apparent distress.  Neck: Neck is supple without lymphadenopathy.  Lungs: Normal effort, CTA bilaterally, no wheezes, rhonchi, or rales  CV: Regular rate and rhythm. No murmurs, rubs, or gallops.  Ext: No clubbing, cyanosis, edema.  Skin: Bilateral palms with dry, scaling lesions and cracking.    Assessment & Plan:     40 y.o. male with the following -     1. Dyshidrotic eczema  This is a new condition.  For last 2 weeks has had a rash on the palms of both hands and in the intertriginous areas of the fingers.  Based on his history and exam I suspect this is dyshidrotic eczema.  He states his been given steroid creams in the past which have been helpful for the same type of rash.  -Betamethasone 0.05% cream twice daily for 2 to 4 weeks as needed for outbreaks    Return if symptoms worsen or fail to improve.    Please note that this dictation was created using voice recognition software. I have made every reasonable attempt to correct obvious errors, but I expect that there are errors of grammar and possibly content that I did not discover before finalizing the note.

## 2020-10-15 ENCOUNTER — OFFICE VISIT (OUTPATIENT)
Dept: MEDICAL GROUP | Facility: PHYSICIAN GROUP | Age: 40
End: 2020-10-15
Payer: COMMERCIAL

## 2020-10-15 VITALS
BODY MASS INDEX: 26.69 KG/M2 | SYSTOLIC BLOOD PRESSURE: 126 MMHG | TEMPERATURE: 97.8 F | DIASTOLIC BLOOD PRESSURE: 78 MMHG | WEIGHT: 201.4 LBS | RESPIRATION RATE: 16 BRPM | HEIGHT: 73 IN | HEART RATE: 58 BPM | OXYGEN SATURATION: 98 %

## 2020-10-15 DIAGNOSIS — L91.8 SKIN TAG: ICD-10-CM

## 2020-10-15 PROBLEM — L81.9 DISCOLORATION OF SKIN: Status: RESOLVED | Noted: 2020-10-15 | Resolved: 2020-10-15

## 2020-10-15 PROBLEM — L81.9 DISCOLORATION OF SKIN: Status: ACTIVE | Noted: 2020-10-15

## 2020-10-15 PROCEDURE — 11200 RMVL SKIN TAGS UP TO&INC 15: CPT | Performed by: FAMILY MEDICINE

## 2020-10-15 ASSESSMENT — FIBROSIS 4 INDEX: FIB4 SCORE: 0.7

## 2020-10-15 NOTE — ASSESSMENT & PLAN NOTE
This is a new condition. He has had a skin tag in left axilla for several months. He noticed in the last week there was a change to the skin tag. It is redder and now is tender when he touches it. He would like it removed.

## 2020-10-15 NOTE — PROGRESS NOTES
"Subjective:     CC: skin tag    HPI:   Diego presents today with     Skin tag  This is a new condition. He has had a skin tag in left axilla for several months. He noticed in the last week there was a change to the skin tag. It is redder and now is tender when he touches it. He would like it removed.      Past Medical History:   Diagnosis Date   • Alpha 1-antitrypsin PiMS phenotype    • Chickenpox    • Finnish measles    • Mumps        Social History     Tobacco Use   • Smoking status: Never Smoker   • Smokeless tobacco: Never Used   Substance Use Topics   • Alcohol use: Yes     Alcohol/week: 8.4 oz     Types: 14 Cans of beer per week   • Drug use: Yes     Types: Inhaled, Marijuana     Comment: marijuana daily       Current Outpatient Medications Ordered in Epic   Medication Sig Dispense Refill   • omeprazole (PRILOSEC) 20 MG delayed-release capsule Take 20 mg by mouth every day.     • albuterol 108 (90 Base) MCG/ACT Aero Soln inhalation aerosol Inhale 2 Puffs by mouth every four hours as needed for Shortness of Breath. 1 Inhaler 2     No current Epic-ordered facility-administered medications on file.        Allergies:  Patient has no known allergies.    Health Maintenance: Completed    ROS:  Gen: no fevers/chills  Pulm: no sob  CV: no chest pain    Objective:     Exam:  /78 (BP Location: Right arm, Patient Position: Sitting, BP Cuff Size: Adult)   Pulse (!) 58   Temp 36.6 °C (97.8 °F) (Temporal)   Resp 16   Ht 1.854 m (6' 1\")   Wt 91.4 kg (201 lb 6.4 oz)   SpO2 98%   BMI 26.57 kg/m²  Body mass index is 26.57 kg/m².    Gen: Alert and oriented, No apparent distress.  Neck: Neck is supple without lymphadenopathy.  Lungs: Normal effort, CTA bilaterally, no wheezes, rhonchi, or rales  CV: Regular rate and rhythm. No murmurs, rubs, or gallops.  Ext: No clubbing, cyanosis, edema.  Skin: Erythematous skin tag in left axilla, tender to palpation. No warmth or drainage.    Assessment & Plan:     40 y.o. male " with the following -     1. Skin tag  This is a chronic condition, new to me.  For several months he is had a skin tag in his left axilla.  However, in the last week it is changed.  It is much redder and much more tender.  He does think that something tugged or pulled on it about a week ago and he would like it removed today.  On exam he does have an irritated skin tag but there is no evidence of malignancy or infection.  Verbal consent was obtained.  The area of the skin tag was cleaned and the skin tag was removed using scissors.  He tolerated the procedure well.  Return precautions were provided.    Return if symptoms worsen or fail to improve.    Please note that this dictation was created using voice recognition software. I have made every reasonable attempt to correct obvious errors, but I expect that there are errors of grammar and possibly content that I did not discover before finalizing the note.

## 2020-10-15 NOTE — ASSESSMENT & PLAN NOTE
This is a new condition.  Onset:  Location:  Duration:  Quality:  Modifying factors:  Associated symptoms:  Home treatments:

## 2020-11-11 ENCOUNTER — NURSE TRIAGE (OUTPATIENT)
Dept: HEALTH INFORMATION MANAGEMENT | Facility: OTHER | Age: 40
End: 2020-11-11

## 2020-11-11 NOTE — TELEPHONE ENCOUNTER
Regarding: NEXT LEVEL CARE  ----- Message from Regi Sweet sent at 11/11/2020  9:11 AM PST -----  ABDOMINAL PAIN

## 2020-11-12 ENCOUNTER — OFFICE VISIT (OUTPATIENT)
Dept: MEDICAL GROUP | Facility: PHYSICIAN GROUP | Age: 40
End: 2020-11-12
Payer: COMMERCIAL

## 2020-11-12 VITALS
TEMPERATURE: 97.7 F | OXYGEN SATURATION: 95 % | RESPIRATION RATE: 16 BRPM | BODY MASS INDEX: 26.64 KG/M2 | DIASTOLIC BLOOD PRESSURE: 64 MMHG | SYSTOLIC BLOOD PRESSURE: 102 MMHG | WEIGHT: 201 LBS | HEIGHT: 73 IN | HEART RATE: 65 BPM

## 2020-11-12 DIAGNOSIS — Z23 NEED FOR VACCINATION: ICD-10-CM

## 2020-11-12 DIAGNOSIS — R10.84 GENERALIZED ABDOMINAL PAIN: ICD-10-CM

## 2020-11-12 PROCEDURE — 90471 IMMUNIZATION ADMIN: CPT | Performed by: FAMILY MEDICINE

## 2020-11-12 PROCEDURE — 90686 IIV4 VACC NO PRSV 0.5 ML IM: CPT | Performed by: FAMILY MEDICINE

## 2020-11-12 PROCEDURE — 99213 OFFICE O/P EST LOW 20 MIN: CPT | Mod: 25 | Performed by: FAMILY MEDICINE

## 2020-11-12 ASSESSMENT — FIBROSIS 4 INDEX: FIB4 SCORE: 0.7

## 2020-11-13 NOTE — PATIENT INSTRUCTIONS
I am suspicious you have irritable bowel syndrome - diarrhea component  - FODMAP diet, limit foods that are high in FODMAPs  - Gas-x as needed for gas  - IBGard  - manage your stress   - working on schedule so you do have time for yourself   - mindfulness & meditation ([employer] for phone)   Left voice message to please call and schedule 6/9 month well exam at [phone].

## 2020-11-13 NOTE — ASSESSMENT & PLAN NOTE
This is a new condition.  Onset: years, this episode in the last week  Location: lower abdomen  Duration: initially intermittent, today constant  Timing: initially only at night & waking him up, now occurring during the day sometimes  Quality: bloated, gassy  Modifying factors: relieves with farting or BM  Precipitating trauma:  Associated symptoms: +increased farting, unsure about burping, +mild nausea occasionally, no vomiting, +loose stools every few days, no constipation  Home treatments: omeprazole daily, rolaids    He has been under a lot of stress recently and wonders if that is contributing.

## 2020-11-13 NOTE — PROGRESS NOTES
"Subjective:     CC: abdominal pain    HPI:   Diego presents today with     Abdominal pain  This is a new condition.  Onset: years, this episode in the last week  Location: lower abdomen  Duration: initially intermittent, today constant  Timing: initially only at night & waking him up, now occurring during the day sometimes  Quality: bloated, gassy  Modifying factors: relieves with farting or BM  Precipitating trauma:  Associated symptoms: +increased farting, unsure about burping, +mild nausea occasionally, no vomiting, +loose stools every few days, no constipation  Home treatments: omeprazole daily, rolaids    He has been under a lot of stress recently and wonders if that is contributing.       Past Medical History:   Diagnosis Date   • Alpha 1-antitrypsin PiMS phenotype    • Chickenpox    • Japanese measles    • Mumps        Social History     Tobacco Use   • Smoking status: Never Smoker   • Smokeless tobacco: Never Used   Substance Use Topics   • Alcohol use: Yes     Alcohol/week: 8.4 oz     Types: 14 Cans of beer per week   • Drug use: Yes     Types: Inhaled, Marijuana     Comment: marijuana daily       Current Outpatient Medications Ordered in Epic   Medication Sig Dispense Refill   • omeprazole (PRILOSEC) 20 MG delayed-release capsule Take 20 mg by mouth every day.     • albuterol 108 (90 Base) MCG/ACT Aero Soln inhalation aerosol Inhale 2 Puffs by mouth every four hours as needed for Shortness of Breath. (Patient not taking: Reported on 11/12/2020) 1 Inhaler 2     No current Epic-ordered facility-administered medications on file.        Allergies:  Patient has no known allergies.    Health Maintenance: Completed    ROS:  Gen: no fevers/chills  Pulm: no sob  CV: no chest pain    Objective:     Exam:  /64 (BP Location: Left arm, Patient Position: Sitting, BP Cuff Size: Adult)   Pulse 65   Temp 36.5 °C (97.7 °F)   Resp 16   Ht 1.854 m (6' 1\")   Wt 91.2 kg (201 lb)   SpO2 95%   BMI 26.52 kg/m²  Body " mass index is 26.52 kg/m².    Gen: Alert and oriented, No apparent distress.  Neck: Neck is supple without lymphadenopathy.  Lungs: Normal effort, CTA bilaterally, no wheezes, rhonchi, or rales  CV: Regular rate and rhythm. No murmurs, rubs, or gallops.  GI:  +BS, distended, mild TTP diffusely, no masses, no hepatosplenomegaly  Ext: No clubbing, cyanosis, edema.    Assessment & Plan:     40 y.o. male with the following -     1. Generalized abdominal pain  This is a new condition.  For years he has had issues with his abdomen.  He will get periods of time when he has bloating and abdominal pain.  This current episode started last week and he has been waking up in the night with pain associated to bloating and excess gas.  It will be relieved with passing flatus or having a bowel movement.  Every few days he will have loose stools but no constipation or vomiting.  He has been under increased stress at work recently wonders if that is contributing.  He does report for at least 1 day a week for the last 3 months has had recurrent abdominal pain that is associated with defecation, changes in the frequency of his stool, and changes in the consistency of his stool.  Therefore, he does meet the room for diagnostic criteria for IBS.  I strongly suspect that is what is causing his symptoms.  He is on omeprazole daily which is controlling his acid reflux.  -We discussed sticking to a low FODMAP diet  -He can use Gas-X as needed for bloating and gas pain  -We also discussed using IBgard regularly  -If these do not manage his symptoms, we could consider an SSRI    2. Need for vaccination  - Influenza Vaccine Quad Injection (PF)    Return if symptoms worsen or fail to improve.    Please note that this dictation was created using voice recognition software. I have made every reasonable attempt to correct obvious errors, but I expect that there are errors of grammar and possibly content that I did not discover before finalizing the  note.

## 2021-01-16 ENCOUNTER — HOSPITAL ENCOUNTER (OUTPATIENT)
Dept: LAB | Facility: MEDICAL CENTER | Age: 41
End: 2021-01-16
Attending: ANESTHESIOLOGY
Payer: COMMERCIAL

## 2021-01-16 PROCEDURE — C9803 HOPD COVID-19 SPEC COLLECT: HCPCS

## 2021-01-16 PROCEDURE — U0005 INFEC AGEN DETEC AMPLI PROBE: HCPCS

## 2021-01-16 PROCEDURE — U0003 INFECTIOUS AGENT DETECTION BY NUCLEIC ACID (DNA OR RNA); SEVERE ACUTE RESPIRATORY SYNDROME CORONAVIRUS 2 (SARS-COV-2) (CORONAVIRUS DISEASE [COVID-19]), AMPLIFIED PROBE TECHNIQUE, MAKING USE OF HIGH THROUGHPUT TECHNOLOGIES AS DESCRIBED BY CMS-2020-01-R: HCPCS

## 2021-01-17 LAB
COVID ORDER STATUS COVID19: NORMAL
SARS-COV-2 RNA RESP QL NAA+PROBE: NOTDETECTED
SPECIMEN SOURCE: NORMAL

## 2021-02-05 ENCOUNTER — PHYSICAL THERAPY (OUTPATIENT)
Dept: PHYSICAL THERAPY | Facility: REHABILITATION | Age: 41
End: 2021-02-05
Attending: ORTHOPAEDIC SURGERY
Payer: COMMERCIAL

## 2021-02-05 DIAGNOSIS — M25.571 ACUTE RIGHT ANKLE PAIN: ICD-10-CM

## 2021-02-05 PROCEDURE — 97140 MANUAL THERAPY 1/> REGIONS: CPT

## 2021-02-05 PROCEDURE — 97161 PT EVAL LOW COMPLEX 20 MIN: CPT

## 2021-02-05 ASSESSMENT — ENCOUNTER SYMPTOMS
EXACERBATED BY: WALKING
QUALITY: ACHING
PAIN SCALE AT LOWEST: 2
PAIN LOCATION: LATERAL R ANKLE
QUALITY: STABBING
AWAKENINGS PER NIGHT: 3
QUALITY: TIGHT
QUALITY: SHARP
PAIN SCALE: 2
QUALITY: THROBBING
EXACERBATED BY: TWISTING
PAIN SCALE AT HIGHEST: 5
EXACERBATED BY: STAIR CLIMBING
EXACERBATED BY: SQUATTING
PAIN TIMING: EVERY MORNING

## 2021-02-05 ASSESSMENT — ACTIVITIES OF DAILY LIVING (ADL): POOR_BALANCE: 1

## 2021-02-05 NOTE — OP THERAPY EVALUATION
Outpatient Physical Therapy  INITIAL EVALUATION    [address]  [address]  [place] [address]  Phone:  [phone]  Fax:  [phone]    Date of Evaluation: 2021    Patient: Diego Abarca  YOB: 1980  MRN: [id number]     Referring Provider: Jered Jiang, Student  No address on file   Referring Diagnosis Ganglion, right ankle and foot [M67.471]     Time Calculation    Start time: 1118  Stop time: 1205 Time Calculation (min): 47 minutes         Chief Complaint: Ankle Problem    Visit Diagnoses     ICD-10-CM   1. Acute right ankle pain  M25.571         Subjective:   History of Present Illness:     Mechanism of injury:    Pt presents to PT with complaint of R ankle pain, 2 week S/P arthroscopic surgery on 21 for a chronic ganglion cyst. Pre-op, pt reports he was having progressive worsening pain, particularly when climbing a ladder or prolonged walking. The ankle had been drained several times, but would refill.  Ultimately surgery was indicated.     Reports he feels he is doing well since surgery.  The stitches were removed at a follow up 2 days ago and pt reports Dr. Jiang was pleased with his progress/healing.  No signs of infection. Currently ambulatory with a walking boot, WBAT.  Presents without AD, but does use 2 crutches when the pain/swelling increases- 25% of the day.  Denies N/T. Currently not working  Prior level of function:  - full time self employed.  Active with hiking, paddleboarding.    Sleep disturbance:  Interrupted sleep (Gabapentin at night. )  # Times/Night awakened:  3  Pain:     Current pain ratin    At best pain ratin    At worst pain ratin    Location:  Lateral R ankle    Quality:  Aching, sharp, stabbing, tight and throbbing    Pain timing:  Every morning    Alleviating factors: Gabapentin at night and advil during the day.     Aggravating factors:  Walking, squatting, twisting and  stair climbing    Progression:  Improving  Social Support:     Lives in:  Multiple-level home    Lives with:  Spouse and young children  Diagnostic Tests:     MRI studies: abnormal (6/2020)      Diagnostic Tests Comments:    IMPRESSION:      1. A 1.2 x 1.5 x 0.9 cm multiloculated ganglion cyst superficial to the peroneal tendon and in close proximity to the anterior talofibular ligament, likely arising from the ligament.     2. Interstitial tear and tenosynovitis of the distal extensor digitorum longus tendon.     3. Intraosseous ganglion cyst in the mid calcaneus beneath the angle of Gissane. Associated thickening of the interosseous talocalcaneal ligament as well at replacement of the fat in the sinus tarsi with enhancement. Correlate with sinus tarsi syndrome.     4. Mild tenosynovitis of the tear is posterior tendon.     5. Mild tendinosis of the Achilles tendon.  Patient Goals:     Patient goals for therapy:  Decreased pain, increased motion, independence with ADLs/IADLs and increased strength      Past Medical History:   Diagnosis Date   • Alpha 1-antitrypsin PiMS phenotype    • Chickenpox    • [place]div[place] measles    • Mumps      Past Surgical History:   Procedure Laterality Date   • OTHER ORTHOPEDIC SURGERY Left 2002    knee     Social History     Tobacco Use   • Smoking status: Never Smoker   • Smokeless tobacco: Never Used   Substance Use Topics   • Alcohol use: Yes     Alcohol/week: 8.4 oz     Types: 14 Cans of beer per week     Family and Occupational History     Socioeconomic History   • Marital status:      Spouse name: Not on file   • Number of children: Not on file   • Years of education: Not on file   • Highest education level: Not on file   Occupational History   • Not on file       Objective     Observations     Additional Observation Details  Primary incision present inferior to lateral malleolus. Steri strips in place here and over ports.  Clean and healing well.     Neurological Testing      Sensation     Ankle/Foot   Left Ankle/Foot   Intact: light touch    Right Ankle/Foot   Intact: light touch     Palpation     Right   Tenderness of the anterior tibialis, lateral gastrocnemius, medial gastrocnemius, peroneus and posterior tibialis.     Tenderness     Right Ankle/Foot   Tenderness in the Achilles insertion, anterior talofibular ligament, lateral malleolus and plantar fascia.     Active Range of Motion   Left Ankle/Foot   Normal active range of motion    Right Ankle/Foot   Dorsiflexion (ke): 5 degrees with pain  Plantar flexion: 10 degrees with pain  Inversion: 5 degrees with pain  Eversion: 10 degrees with pain  Great toe flexion: WFL  Great toe extension: WFL  Lesser toes: WFL    Joint Play     Right Ankle/Foot  Joints within functional limits are the fibular head, proximal tibiofibular joint, midfoot and forefoot. Hypomobile in the distal tibiofibular joint, talocrural joint and subtalar joint.     Strength:      Left Ankle/Foot   Normal strength    Right Ankle/Foot   Dorsiflexion: 3  Plantar flexion: 2+  Inversion: 3  Eversion: 2+  Great toe flexion: 3+  Great toe extension: 3+    General Comments     Ankle/Foot Comments   Fig 8 girth: R= 59 cm, L= 58 cm        Therapeutic Treatments and Modalities:     1. Manual Therapy (CPT 77214), Prone: distraction with PROM in all planes. Midfoot GII-III mobs.  GT flex/ext stretching    Therapeutic Treatment and Modalities Summary: HEP: doming, ankle ABCs and pumps    Time-based treatments/modalities:    Physical Therapy Timed Treatment Charges  Manual therapy minutes (CPT 67748): 15 minutes      Assessment, Response and Plan:   Impairments: abnormal gait, abnormal or restricted ROM, activity intolerance, difficulty performing job, impaired functional mobility, impaired balance, impaired physical strength, lacks appropriate home exercise program, limited ADL's, pain with function and weight-bearing intolerance    Assessment details:  Mr. Abarca is a 40 y.o  male who presents to PT s/p R ankle arthroscopic surgery.  See op-report scan for full details.  Pt is demonstrating signs and symptoms consistent with this stage of recovery.  Pain levels are mild to moderate with use of Advil/gabapentin to modulate. He is reliant on his walking boot and bilateral crutches 25% of the time for ambulation.  The wound is healing well without sign of infection.  ROM and strength are notably below functional levels.  Skilled PT services are indicated to address the mentioned functional limitations and enhance QOL.     Prognosis: good    Goals:   Short Term Goals:   - Improve ankle DF to 20 deg  - Improve ankle PF to 60 deg  - Improve ankle IV to at least 30 deg  - Pt able to perform seated heel raise without pain  Short term goal time span:  1-2 weeks      Long Term Goals:    - Improve LEFS to at least 70/80  - Able to work full duty without AD/boot with no more than 2/10 pain  - Able to heel raise to full height in R SLS  - Indep with HEP  Long term goal time span:  6-8 weeks    Plan:   Therapy options:  Physical therapy treatment to continue  Planned therapy interventions:  E Stim Unattended (CPT 38348), Functional Training, Self Care (CPT 87851), Therapeutic Activities (CPT 81204), Therapeutic Exercise (CPT 71103), Hot or Cold Pack Therapy (CPT 76234), Manual Therapy (CPT 06640) and Neuromuscular Re-education (CPT 66009)  Frequency: 1-2x/week.  Duration in weeks:  8  Discussed with:  Patient      Functional Assessment Used    LEFS=36/80    Referring provider co-signature:  I have reviewed this plan of care and my co-signature certifies the need for services.    Certification Period: 02/05/2021 to  04/02/21    Physician Signature: ________________________________ Date: ______________

## 2021-02-10 ENCOUNTER — PHYSICAL THERAPY (OUTPATIENT)
Dept: PHYSICAL THERAPY | Facility: REHABILITATION | Age: 41
End: 2021-02-10
Attending: ORTHOPAEDIC SURGERY
Payer: COMMERCIAL

## 2021-02-10 DIAGNOSIS — M25.571 ACUTE RIGHT ANKLE PAIN: ICD-10-CM

## 2021-02-10 PROCEDURE — 97110 THERAPEUTIC EXERCISES: CPT

## 2021-02-10 PROCEDURE — 97140 MANUAL THERAPY 1/> REGIONS: CPT

## 2021-02-10 NOTE — OP THERAPY DAILY TREATMENT
"  Outpatient Physical Therapy  DAILY TREATMENT     [facility] Physical [address].  [address]  [place] [address]  Phone:  [phone]  Fax:  [phone]    Date: 02/10/2021    Patient: Diego Abarca  YOB: 1980  MRN: [id number]     Time Calculation    Start time: 0920  Stop time: 1009 Time Calculation (min): 49 minutes         Chief Complaint: Ankle Problem    Visit #: 2    SUBJECTIVE:  There has been a lot of pain across the top the last few days.     OBJECTIVE:      Therapeutic Exercises (CPT 39677):     1. 4 way ankle isometrics, 5\" x 10 ea, painless in all planes.     2. AAROM: 4 way ankle, x 10 ea    3. BAPS, L2, AP/lateral/CW/CCW x 10 ea    4. Seated heel raises, x 15    Therapeutic Treatments and Modalities:     1. Manual Therapy (CPT 00186), Prone: distraction with PROM in all planes. Midfoot GII-III mobs.  GT flex/ext stretching, painfree cavitations x 3 during PROM    2. E Stim Unattended (CPT 94353), IFC and constrast to the R ankle in elevation x 15 min    Time-based treatments/modalities:    Physical Therapy Timed Treatment Charges  Manual therapy minutes (CPT 37194): 15 minutes  Therapeutic exercise minutes (CPT 11838): 15 minutes    ASSESSMENT:   Response to treatment: Improved ROM compared to LV.  Passive DF=5, PF=40. Cavitations during. Able to advance AA and AROM tasks.  Gabapentin reported to be providing little relief.  Trial IFC to determine response    PLAN/RECOMMENDATIONS:   Plan for treatment: therapy treatment to continue next visit.  Planned interventions for next visit: continue with current treatment.       "

## 2021-02-18 ENCOUNTER — PHYSICAL THERAPY (OUTPATIENT)
Dept: PHYSICAL THERAPY | Facility: REHABILITATION | Age: 41
End: 2021-02-18
Attending: ORTHOPAEDIC SURGERY
Payer: COMMERCIAL

## 2021-02-18 DIAGNOSIS — M25.571 ACUTE RIGHT ANKLE PAIN: ICD-10-CM

## 2021-02-18 PROCEDURE — 97112 NEUROMUSCULAR REEDUCATION: CPT

## 2021-02-18 PROCEDURE — 97014 ELECTRIC STIMULATION THERAPY: CPT

## 2021-02-18 PROCEDURE — 97110 THERAPEUTIC EXERCISES: CPT

## 2021-02-18 NOTE — OP THERAPY DAILY TREATMENT
Outpatient Physical Therapy  DAILY TREATMENT     [facility] Physical Therapy [address]  [address]  [place] [address]  Phone:  [phone]  Fax:  [phone]    Date: 02/18/2021    Patient: Diego Abarca  YOB: 1980  MRN: [id number]     Time Calculation    Start time: 1318  Stop time: 1415 Time Calculation (min): 57 minutes         Chief Complaint: Ankle Problem    Visit #: 3    SUBJECTIVE:  Anxious to get out of the boot.  Bothered by it rubbing the incision.     OBJECTIVE:      Therapeutic Exercises (CPT 66129):     1. Upright bike , L3 x 5 min    2. Shuttle, Bilateral squat: 5C x 20, SL 3C x 20    3. //bars:    4. - standing doming, x 15    5. - standing heel raises, weighted mainly on uninvolved, x 15 reps to 1/4 height    6. - SLS , attempts x 1 min    7. - pregait stepping, x 10 leading ea side    8. - mini squats, x 10    Therapeutic Treatments and Modalities:     1. Manual Therapy (CPT 01499), Prone: distraction with PROM in all planes. Midfoot GII-III mobs.  GT flex/ext stretching    2. E Stim Unattended (CPT 32659), IFC and MH to the R ankle in elevation x 15 mins    Time-based treatments/modalities:    Physical Therapy Timed Treatment Charges  Neuromusc re-ed, balance, coor, post minutes (CPT 84166): 10 minutes  Therapeutic exercise minutes (CPT 93927): 25 minutes    ASSESSMENT:   Response to treatment: Improved foot/ankle control allowing advancement to standing tasks and pre-gait stepping.  Well tolerated, but has had some delayed onset pain/swelling in the past.  Monitor response.  Continue to work toward being able to wean from the boot    PLAN/RECOMMENDATIONS:   Plan for treatment: therapy treatment to continue next visit.  Planned interventions for next visit: continue with current treatment.

## 2021-02-24 ENCOUNTER — PHYSICAL THERAPY (OUTPATIENT)
Dept: PHYSICAL THERAPY | Facility: REHABILITATION | Age: 41
End: 2021-02-24
Attending: ORTHOPAEDIC SURGERY
Payer: COMMERCIAL

## 2021-02-24 DIAGNOSIS — M25.571 ACUTE RIGHT ANKLE PAIN: ICD-10-CM

## 2021-02-24 PROCEDURE — 97140 MANUAL THERAPY 1/> REGIONS: CPT

## 2021-02-24 PROCEDURE — 97110 THERAPEUTIC EXERCISES: CPT

## 2021-02-24 NOTE — OP THERAPY DAILY TREATMENT
"  Outpatient Physical Therapy  DAILY TREATMENT     [facility] Physical [address].  [address]  [place] [address]  Phone:  [phone]  Fax:  [phone]    Date: 02/24/2021    Patient: Diego Abarca  YOB: 1980  MRN: [id number]     Time Calculation    Start time: 1247  Stop time: 1332 Time Calculation (min): 45 minutes         Chief Complaint: Ankle Problem    Visit #: 4    SUBJECTIVE:  Pt presents without immobilizer and B crutches.  States the day after LV, he had redness and swelling throughout the R lower leg. Went to ortho office where the steristrips were replaced and he was told to rest the leg this week.  States he felt the boot was rubbing too much at the incision site. He has been opting to walk without the boot, but support from crutches.  States he is noting the pain to be greatly reduced and mobility improving.     OBJECTIVE:      Therapeutic Exercises (CPT 38724):     1. 4 way ankle TB, L2 x 20 ea    2. Shuttle, Bilateral squat: 6C x 20, SL 4C x 20    3. //bars:    4. - rocker board, AP x 2 mins, B UE support-> none    5. - runners stretch with 1/2 roller under great toe, x 1 min    6. - SLS , attempts x 2 min, best effort 18\"    8. - mini squats, x 10    Therapeutic Treatments and Modalities:     1. Manual Therapy (CPT 94797), Prone: distraction with PROM in all planes. Midfoot GII-III mobs.  GT flex/ext stretching    2. E Stim Unattended (CPT 50538), declined. Low pain level today    Time-based treatments/modalities:    Physical Therapy Timed Treatment Charges  Manual therapy minutes (CPT 94828): 15 minutes  Therapeutic exercise minutes (CPT 39461): 30 minutes    ASSESSMENT:   Response to treatment: DF=15 deg and PF= 50 deg passively.  Frequent crepitus from the ankle mortise during PROM.  Therex with improved tolerance and neutral foot control.  Adv HEP with 4 way ankle, mini squat and SLS.  To talk with surgeon regarding if it would be more appropriate to " use a soft brace vs no support at all.     PLAN/RECOMMENDATIONS:   Plan for treatment: therapy treatment to continue next visit.  Planned interventions for next visit: continue with current treatment.

## 2021-03-03 ENCOUNTER — APPOINTMENT (OUTPATIENT)
Dept: PHYSICAL THERAPY | Facility: REHABILITATION | Age: 41
End: 2021-03-03
Attending: FAMILY MEDICINE
Payer: COMMERCIAL

## 2021-03-05 ENCOUNTER — PHYSICAL THERAPY (OUTPATIENT)
Dept: PHYSICAL THERAPY | Facility: REHABILITATION | Age: 41
End: 2021-03-05
Attending: ORTHOPAEDIC SURGERY
Payer: COMMERCIAL

## 2021-03-05 DIAGNOSIS — M25.571 ACUTE RIGHT ANKLE PAIN: ICD-10-CM

## 2021-03-05 PROCEDURE — 97140 MANUAL THERAPY 1/> REGIONS: CPT

## 2021-03-05 PROCEDURE — 97110 THERAPEUTIC EXERCISES: CPT

## 2021-03-05 NOTE — OP THERAPY DAILY TREATMENT
"  Outpatient Physical Therapy  DAILY TREATMENT     [facility] Physical [address]  [address]  [place] [address]  Phone:  [phone]  Fax:  [phone]    Date: 03/05/2021    Patient: Diego Abarca  YOB: 1980  MRN: [id number]     Time Calculation    Start time: 0953  Stop time: 1020 Time Calculation (min): 27 minutes         Chief Complaint: Ankle Problem    Visit #: 5    SUBJECTIVE:  The lateral wound is now being treated with silver dressings, which has resulted in improved closure, decreased pain, decreased redness.  No longer using boot or AD.  Has been using the lace up brace intermittently- does not present with it today.     OBJECTIVE:        Therapeutic Exercises (CPT 34827):     1. 4 way ankle (manually resisted), x 10 ea way    2. Deep squat with counter support for DF stretch, x 2 min    3. Mini squats, 1/4 depth x 20, freq cues and mirror feedback to support medial arch and knee alignment/hip rectuitment    4. Bilat heel raise, x 20, x 1 min    6. - SLS , attempts x 2 min, best effort 18\"    Therapeutic Treatments and Modalities:     1. Manual Therapy (CPT 76162), Prone: distraction with PROM in all planes. Talo-cural PA and AP GIV mobs in end range DF/PF respectively.  GT flex/ext stretching    Time-based treatments/modalities:    Physical Therapy Timed Treatment Charges  Manual therapy minutes (CPT 92678): 8 minutes  Therapeutic exercise minutes (CPT 03984): 19 minutes    ASSESSMENT:   Response to treatment: Ankle AROM nearing normal in all planes except DF.  Needs to regain 5-10 more degrees of DF to achieve normal squat loading.  Strength/power steadily improving, although still unable to heel raise to full height with bilateral limb support.  Focus on HR and squats with mirror feedback at home for this week.  Work toward SL heel raises, balance challenges, squat loading for work prep.    PLAN/RECOMMENDATIONS:   Plan for treatment: therapy treatment to " continue next visit.  Planned interventions for next visit: continue with current treatment.

## 2021-03-08 ENCOUNTER — PHYSICAL THERAPY (OUTPATIENT)
Dept: PHYSICAL THERAPY | Facility: REHABILITATION | Age: 41
End: 2021-03-08
Attending: ORTHOPAEDIC SURGERY
Payer: COMMERCIAL

## 2021-03-08 DIAGNOSIS — M25.571 ACUTE RIGHT ANKLE PAIN: ICD-10-CM

## 2021-03-08 PROCEDURE — 97110 THERAPEUTIC EXERCISES: CPT

## 2021-03-08 NOTE — OP THERAPY DAILY TREATMENT
Outpatient Physical Therapy  DAILY TREATMENT     [facility] Physical Therapy [address].  [address]  [place] [address]  Phone:  [phone]  Fax:  [phone]    Date: 03/08/2021    Patient: Diego Abarca  YOB: 1980  MRN: [id number]     Time Calculation    Start time: 1415  Stop time: 1445 Time Calculation (min): 30 minutes         Chief Complaint: Ankle Problem    Visit #: 6    SUBJECTIVE:  The ankle seems to be getting better every day.  I was able to work light duty without any problems.  The wound continues to heal. States the squatting still feels odd and 'wobbly.'    OBJECTIVE:      Therapeutic Exercises (CPT 65300):     1. Elliptical, L6 x 5 min    2. Shuttle, Squat: bilat 8C wtih TB around knees, SL 4C with worbble board, Heel raise 8C x 20 bilat and 4C x 20 SL    3. 1/2 kneeling DF stretch, x 2 min    4. Ball bridge, 2x1 min    5. Ball HS curl, 2x10    6. STS taps ->air squat -> squat with TB around knees, x 10 ea with mirror feedback      Time-based treatments/modalities:    Physical Therapy Timed Treatment Charges  Therapeutic exercise minutes (CPT 61692): 30 minutes    ASSESSMENT:   Response to treatment: Improved WBing tolerance, improved squat mechanics but still reliant on biofeedback and external cues.  Continue to develop post chain strength, squat control.  Review DL, lunge and step down mechanics for work prep.  Balance progressions if time allows.     PLAN/RECOMMENDATIONS:   Plan for treatment: therapy treatment to continue next visit.  Planned interventions for next visit: continue with current treatment.

## 2021-03-10 ENCOUNTER — PHYSICAL THERAPY (OUTPATIENT)
Dept: PHYSICAL THERAPY | Facility: REHABILITATION | Age: 41
End: 2021-03-10
Attending: ORTHOPAEDIC SURGERY
Payer: COMMERCIAL

## 2021-03-10 DIAGNOSIS — M25.571 ACUTE RIGHT ANKLE PAIN: ICD-10-CM

## 2021-03-10 PROCEDURE — 97110 THERAPEUTIC EXERCISES: CPT

## 2021-03-10 NOTE — OP THERAPY DAILY TREATMENT
"  Outpatient Physical Therapy  DAILY TREATMENT     [facility] Physical Therapy [address]  [address]  [place] [address]  Phone:  [phone]  Fax:  [phone]    Date: 03/10/2021    Patient: Diego Abarca  YOB: 1980  MRN: [id number]     Time Calculation    Start time: 1219  Stop time: 1245 Time Calculation (min): 26 minutes         Chief Complaint: Ankle Problem    Visit #: 7    SUBJECTIVE:  Im in a little more pain today, seems a little swollen.     OBJECTIVE:      Therapeutic Exercises (CPT 00826):     2. Shuttle, Squat: bilat 8C wtih TB around knees, SL 4C with worbble board, Heel raise 8C x 20 bilat and 4C x 20 SL    3. SL star balance, attempts x 2 min    4. Box squat, 0# x 10, 10# goblet x 10    5. Lunge, with dowel support: x 10 ea side (shallow with R LE back due knee and ankle presssure)    6. Eccent step down, x 10 ea with mirror feedback, 4\"      Time-based treatments/modalities:    Physical Therapy Timed Treatment Charges  Therapeutic exercise minutes (CPT 38198): 26 minutes    ASSESSMENT:   Response to treatment: Improving DF allowing deeper positions and improved loading tolerance for squat.  Advanced SL loading with star balance and eccent step down.  Demonstrates decreased control compared to the L.     PLAN/RECOMMENDATIONS:   Plan for treatment: therapy treatment to continue next visit.  Planned interventions for next visit: continue with current treatment. Cont to develop full ROM and control in functional patterns, progress toward load tolerance and plyo       "

## 2021-03-15 ENCOUNTER — PHYSICAL THERAPY (OUTPATIENT)
Dept: PHYSICAL THERAPY | Facility: REHABILITATION | Age: 41
End: 2021-03-15
Attending: ORTHOPAEDIC SURGERY
Payer: COMMERCIAL

## 2021-03-15 DIAGNOSIS — M25.571 ACUTE RIGHT ANKLE PAIN: ICD-10-CM

## 2021-03-15 PROCEDURE — 97110 THERAPEUTIC EXERCISES: CPT

## 2021-03-15 NOTE — OP THERAPY DAILY TREATMENT
"  Outpatient Physical Therapy  DAILY TREATMENT     [facility] Physical Therapy [address].  [address]  [place] [address]  Phone:  [phone]  Fax:  [phone]    Date: 03/15/2021    Patient: Diego Abarca  YOB: 1980  MRN: [id number]     Time Calculation    Start time: 1416  Stop time: 1445 Time Calculation (min): 29 minutes         Chief Complaint: Ankle Problem    Visit #: 8    SUBJECTIVE:  Steadily working more hours with fairly good tolerance.  Sore today from trying some skateboarding with his son over the weekend. (Involved LE is the one he pushes with).     OBJECTIVE:      Therapeutic Exercises (CPT 71355):     1. Trampoline, bounce: center and staggered ea way 3x1 min ea, SLS attempts x 1 min, 1/2 tandem with EC x 1 min    2. 2x4 gait, fwd/back x 3 laps    3. Box step up, 18\" x 15 ea    4. Shuttle, 3C jumps 2x20    5. TB side steps, L1 2x 2 laps    6. HR (single and double), x 20 ea    7. Banded DF stretch with post mob, x 2 min      Time-based treatments/modalities:    Physical Therapy Timed Treatment Charges  Therapeutic exercise minutes (CPT 79837): 29 minutes    ASSESSMENT:   Response to treatment: Still with slight limit in R ankle DF and PF.  Low irritability, improved functional loading and continues to improve with mobilization.      PLAN/RECOMMENDATIONS:   Plan for treatment: therapy treatment to continue next visit.  Planned interventions for next visit: continue with current treatment. Focus on SL balance, EC tasks, SL HR strength. Progress pyrometrics as able.        "

## 2021-03-17 ENCOUNTER — APPOINTMENT (OUTPATIENT)
Dept: PHYSICAL THERAPY | Facility: REHABILITATION | Age: 41
End: 2021-03-17
Attending: ORTHOPAEDIC SURGERY
Payer: COMMERCIAL

## 2021-03-24 ENCOUNTER — APPOINTMENT (OUTPATIENT)
Dept: PHYSICAL THERAPY | Facility: REHABILITATION | Age: 41
End: 2021-03-24
Attending: ORTHOPAEDIC SURGERY
Payer: COMMERCIAL

## 2021-03-31 ENCOUNTER — APPOINTMENT (OUTPATIENT)
Dept: PHYSICAL THERAPY | Facility: REHABILITATION | Age: 41
End: 2021-03-31
Attending: ORTHOPAEDIC SURGERY
Payer: COMMERCIAL

## 2021-04-07 ENCOUNTER — PHYSICAL THERAPY (OUTPATIENT)
Dept: PHYSICAL THERAPY | Facility: REHABILITATION | Age: 41
End: 2021-04-07
Attending: ORTHOPAEDIC SURGERY
Payer: COMMERCIAL

## 2021-04-07 DIAGNOSIS — M25.571 ACUTE RIGHT ANKLE PAIN: ICD-10-CM

## 2021-04-07 PROCEDURE — 97140 MANUAL THERAPY 1/> REGIONS: CPT

## 2021-04-07 PROCEDURE — 97110 THERAPEUTIC EXERCISES: CPT

## 2021-04-07 NOTE — OP THERAPY DAILY TREATMENT
Outpatient Physical Therapy  DAILY TREATMENT     [facility] Physical [address].  [address]  [place] [address]  Phone:  [phone]  Fax:  [phone]    Date: 04/07/2021    Patient: Diego Abarca  YOB: 1980  MRN: [id number]     Time Calculation    Start time: 0716  Stop time: 0746 Time Calculation (min): 30 minutes         Chief Complaint: Ankle Problem    Visit #: 9    SUBJECTIVE:  Back to full duty work.  Doing well except some pinching in the front of the ankle.     OBJECTIVE:     Knee to wall DF test: L= 5 inches, R= 2 inches.     Therapeutic Exercises (CPT 10598):     1. TRX squat for prolonged DF hold, x 2 min    2. Banded DF stretch (post tibial mob to decraese impingement), x 2 min    3. Toe raise, x 20    4. Bilat heel raise, x 20    Therapeutic Treatments and Modalities:     1. Manual Therapy (CPT 38166), Prone: distraction with PROM in all planes. Talo-cural PA and AP GIV mobs in end range DF/PF respectively.  GT flex/ext stretching. SL subtalar eversion mobs GIV    Time-based treatments/modalities:    Physical Therapy Timed Treatment Charges  Manual therapy minutes (CPT 31789): 15 minutes  Therapeutic exercise minutes (CPT 56480): 15 minutes    ASSESSMENT:   Response to treatment: See PN    PLAN/RECOMMENDATIONS:   Plan for treatment: therapy treatment to continue next visit.  Planned interventions for next visit: continue with current treatment.

## 2021-04-07 NOTE — OP THERAPY PROGRESS SUMMARY
Outpatient Physical Therapy  PROGRESS SUMMARY NOTE      [facility] Physical Therapy [address].  [address]  [place] [address]  Phone:  [phone]  Fax:  [phone]    Date of Visit: 04/07/2021    Patient: Diego Abarca  YOB: 1980  MRN: [id number]     Referring Provider: Jered Jiang, Student  No address on file   Referring Diagnosis Ganglion, right ankle and foot [M67.471]     Visit Diagnoses     ICD-10-CM   1. Acute right ankle pain  M25.571       Rehab Potential: good    Progress Report Period: 2/5/21-4/7/21    Functional Assessment Used          Objective Findings and Assessment:   Patient progression towards goals:   Mr. Abarca has been seen for 9 PT sessions treating his R ankle s/p arthroscopic surgery on 1/22/21 for a chronic ganglion cyst. The patient has made good progress overall.  The wound is now fully closed and with good scar mobility and no sign of infection.  He was able to wean from an immobilizer boot to standard footwear with good tolerance. There is minimal swelling. His ROM, strength and WBing tolerance has improved to now allow full duty work. Unfortunately, he does continue to demonstrate limitations in ankle DF (Knee to wall DF test: L= 5 inches, R= 2 inches), which limits his mobility for full depth squat and causes some anterior impingement during work loading. He demonstrates decreased talocural AP/PA glide and capsular restrictions, which contribute to the impingement.  Continued PT services are recommended to assist with regaining full mobility to enhance functional mobility and reduce potential for re-injury.       Goals:   Short Term Goals:   See LTGs      Long Term Goals:    - Improve R ankle DF to match L  - Able to squat to full depth without deviation  - LEFS at least 70/80  Long term goal time span:  4-6 weeks    Plan:   Planned therapy interventions:  E Stim Unattended (CPT 74681), Functional Training, Self Care (CPT 95385),  Neuromuscular Re-education (CPT 73898), Therapeutic Activities (CPT 32916), Therapeutic Exercise (CPT 88760), Manual Therapy (CPT 95698), Gait Training (CPT 03686) and Hot or Cold Pack Therapy (CPT 14007)  Frequency:  1x week  Duration in weeks:  6      Referring provider co-signature:  I have reviewed this plan of care and my co-signature certifies the need for services.     Certification Period: 04/07/2021 to 06/02/21    Physician Signature: ________________________________ Date: ______________

## 2021-04-13 ENCOUNTER — PHYSICAL THERAPY (OUTPATIENT)
Dept: PHYSICAL THERAPY | Facility: REHABILITATION | Age: 41
End: 2021-04-13
Attending: ORTHOPAEDIC SURGERY
Payer: COMMERCIAL

## 2021-04-13 DIAGNOSIS — M25.571 ACUTE RIGHT ANKLE PAIN: ICD-10-CM

## 2021-04-13 PROCEDURE — 97140 MANUAL THERAPY 1/> REGIONS: CPT

## 2021-04-13 PROCEDURE — 97110 THERAPEUTIC EXERCISES: CPT

## 2021-04-13 NOTE — OP THERAPY DAILY TREATMENT
Outpatient Physical Therapy  DAILY TREATMENT     [facility] Physical [address].  [address]  [place] [address]  Phone:  [phone]  Fax:  [phone]    Date: 04/13/2021    Patient: Diego Abarca  YOB: 1980  MRN: [id number]     Time Calculation    Start time: 0815  Stop time: 0848 Time Calculation (min): 33 minutes         Chief Complaint: Ankle Problem    Visit #: 10    SUBJECTIVE:  I was sore for about a day after last visit    OBJECTIVE:      Therapeutic Exercises (CPT 97025):     1. Gastroc rocker stretch, x 2 min    2. SL balance on wedge (end range DF and end range PF), x 2 min    3. TB side step around feet for eversion, x 1 lap    4. Rebounder ball toss: on 1/2 roller fwd and lateral tandem, x 20 reps ea    Therapeutic Treatments and Modalities:     1. Manual Therapy (CPT 36236), Seated IASTM ankle protocol. Prone: distraction with PROM in all planes. Talo-cural PA and AP GIV mobs in end range DF/PF respectively.  GT flex/ext stretching. SL subtalar eversion mobs GIV    Time-based treatments/modalities:    Physical Therapy Timed Treatment Charges  Manual therapy minutes (CPT 74283): 15 minutes  Therapeutic exercise minutes (CPT 60824): 15 minutes    ASSESSMENT:   Response to treatment: Improved knee to wall DF range by 1 inch.  Decreased anterior impingement per patient during work activities.  Would benefit from return to continue maximizing range and functional loading tolerance.     PLAN/RECOMMENDATIONS:   Plan for treatment: therapy treatment to continue next visit.  Planned interventions for next visit: continue with current treatment.

## 2021-04-16 ENCOUNTER — NURSE TRIAGE (OUTPATIENT)
Dept: HEALTH INFORMATION MANAGEMENT | Facility: OTHER | Age: 41
End: 2021-04-16

## 2021-04-16 NOTE — TELEPHONE ENCOUNTER
"Per wife, pt c/o abdominal pain, fatigue, HA x 2 weeks.    Denies fever, diarrhea.    Currently taking  Omeprazole for GI symptoms.    Pts wife, wants an appt to see PCP, PCP does not have an appt available next week, ok to see another provider.    Appt made for next week    Advised pts wife, if symptoms worsen to go to UC/ED.         Reason for Disposition  • [1] MILD pain (e.g., does not interfere with normal activities) AND [2] comes and goes (cramps) AND [3] present > 72 hours    Additional Information  • Negative: Severe difficulty breathing (e.g., struggling for each breath, speaks in single words)  • Negative: Shock suspected (e.g., cold/pale/clammy skin, too weak to stand, low BP, rapid pulse)  • Negative: Difficult to awaken or acting confused (e.g., disoriented, slurred speech)  • Negative: Passed out (i.e., lost consciousness, collapsed and was not responding)  • Negative: Visible sweat on face or sweat dripping down face  • Negative: Sounds like a life-threatening emergency to the triager  • Negative: Followed an abdomen (stomach) injury  • Negative: Chest pain  • Negative: [1] SEVERE pain (e.g., excruciating) AND [2] present > 1 hour  • Negative: [1] Pain lasts > 10 minutes AND [2] age > 50  • Negative: [1] Pain lasts > 10 minutes AND [2] age > 40 AND [3] associated chest, arm, neck, upper back or jaw pain  • Negative: [1] Pain lasts > 10 minutes AND [2] age > 35 AND [3] at least one cardiac risk factor (i.e., hypertension, diabetes, obesity, smoker or strong family history of heart disease)  • Negative: [1] Pain lasts > 10 minutes AND [2] history of heart disease (i.e., heart attack, bypass surgery, angina, angioplasty, CHF; not just a heart murmur)  • Negative: [1] Pain lasts > 10 minutes AND [2] difficulty breathing  • Negative: [1] Vomiting AND [2] contains red blood  (Exception: few streaks and only occurred once)  • Negative: [1] Vomiting AND [2] contains black (\"coffee ground\") material  • " "Negative: Blood in bowel movements  (Exception: Blood on surface of BM with constipation)  • Negative: Black or tarry bowel movements  (Exception: chronic-unchanged  black-grey bowel movements AND is taking iron pills or Pepto-bismol)  • Negative: [1] Pregnant > 24 weeks AND [2] hand or face swelling  • Negative: Patient sounds very sick or weak to the triager  • Negative: [1] MILD-MODERATE pain AND [2] constant AND [3] present > 2 hours  • Negative: [1] MILD-MODERATE pain AND [2] not relieved by antacids  • Negative: [1] Vomiting AND [2] contains bile (green color)  • Negative: [1] Vomiting AND [2] abdomen looks much more swollen than usual  • Negative: White of the eyes have turned yellow (i.e., jaundice)  • Negative: Fever > 103 F (39.4 C)  • Negative: [1] Fever > 101 F (38.3 C) AND [2] age > 60  • Negative: [1] Fever > 100.0 F (37.8 C) AND [2] bedridden (e.g., nursing home patient, CVA, chronic illness, recovering from surgery)  • Negative: [1] Fever > 100.0 F (37.8 C) AND [2] diabetes mellitus or weak immune system (e.g., HIV positive, cancer chemo, splenectomy, organ transplant, chronic steroids)  • Negative: [1] MODERATE pain (e.g., interferes with normal activities) AND [2] comes and goes (cramps) AND [3] present > 24 hours  (Exception: pain with Vomiting or Diarrhea - see that Guideline)    Answer Assessment - Initial Assessment Questions  1. LOCATION: \"Where does it hurt?\"       Frontal HA, Abdominal pain  2. RADIATION: \"Does the pain shoot anywhere else?\" (e.g., chest, back)      No  3. ONSET: \"When did the pain begin?\" (e.g., minutes, hours or days ago)       2 weeks  4. SUDDEN: \"Gradual or sudden onset?\"      Sudden  5. PATTERN \"Does the pain come and go, or is it constant?\"     - If constant: \"Is it getting better, staying the same, or worsening?\"       (Note: Constant means the pain never goes away completely; most serious pain is constant and it progresses)      - If intermittent: \"How long does it " "last?\" \"Do you have pain now?\"      (Note: Intermittent means the pain goes away completely between bouts)      constant  6. SEVERITY: \"How bad is the pain?\"  (e.g., Scale 1-10; mild, moderate, or severe)     - MILD (1-3): doesn't interfere with normal activities, abdomen soft and not tender to touch      - MODERATE (4-7): interferes with normal activities or awakens from sleep, tender to touch      - SEVERE (8-10): excruciating pain, doubled over, unable to do any normal activities        MODERATE  7. RECURRENT SYMPTOM: \"Have you ever had this type of abdominal pain before?\" If so, ask: \"When was the last time?\" and \"What happened that time?\"       Acid reflux  8. AGGRAVATING FACTORS: \"Does anything seem to cause this pain?\" (e.g., foods, stress, alcohol)      Stress  9. CARDIAC SYMPTOMS: \"Do you have any of the following symptoms: chest pain, difficulty breathing, sweating, nausea?\"      No  10. OTHER SYMPTOMS: \"Do you have any other symptoms?\" (e.g., fever, vomiting, diarrhea)        No  11. PREGNANCY: \"Is there any chance you are pregnant?\" \"When was your last menstrual period?\"        n/a    Protocols used: ABDOMINAL PAIN - UPPER-A-AH      "

## 2021-04-16 NOTE — TELEPHONE ENCOUNTER
----- Message from Haven Esposito sent at 4/16/2021 12:10 PM PDT -----  PT IS EXPERIENCING FATIGUE AND HEADACHES. DECLINED VV. NEEDS CLEARANCE TO BE SEEN IN OFFICE FOR [facility]. WIFE DYLAN IS ON THE LINE AND HAS PERMISSION TO DISCUSS TREATMENT.

## 2021-04-19 ENCOUNTER — OFFICE VISIT (OUTPATIENT)
Dept: MEDICAL GROUP | Facility: PHYSICIAN GROUP | Age: 41
End: 2021-04-19
Payer: COMMERCIAL

## 2021-04-19 VITALS
TEMPERATURE: 98.2 F | HEIGHT: 73 IN | RESPIRATION RATE: 12 BRPM | OXYGEN SATURATION: 56 % | SYSTOLIC BLOOD PRESSURE: 110 MMHG | WEIGHT: 203.8 LBS | HEART RATE: 96 BPM | DIASTOLIC BLOOD PRESSURE: 68 MMHG | BODY MASS INDEX: 27.01 KG/M2

## 2021-04-19 DIAGNOSIS — R10.84 GENERALIZED ABDOMINAL PAIN: ICD-10-CM

## 2021-04-19 DIAGNOSIS — L98.9 SKIN LESION: ICD-10-CM

## 2021-04-19 PROCEDURE — 99214 OFFICE O/P EST MOD 30 MIN: CPT | Performed by: FAMILY MEDICINE

## 2021-04-19 RX ORDER — OMEPRAZOLE 20 MG/1
20 CAPSULE, DELAYED RELEASE ORAL DAILY
Qty: 90 CAPSULE | Refills: 1 | Status: SHIPPED | OUTPATIENT
Start: 2021-04-19 | End: 2021-06-30

## 2021-04-19 ASSESSMENT — FIBROSIS 4 INDEX: FIB4 SCORE: 0.7

## 2021-04-19 ASSESSMENT — PATIENT HEALTH QUESTIONNAIRE - PHQ9: CLINICAL INTERPRETATION OF PHQ2 SCORE: 0

## 2021-04-19 NOTE — ASSESSMENT & PLAN NOTE
Onset: years  Location: lower abdomen  Duration: initially intermittent, today constant  Timing: initially only at night & waking him up, now occurring during the day sometimes  Quality: bloated, gassy  Modifying factors: relieves with farting or BM  Associated symptoms: +increased farting, unsure about burping, +mild nausea occasionally, no vomiting, +loose stools every few days, no constipation  Home treatments: omeprazole daily, rolaids    For the last 2 weeks really bad stomach cramping, diarrhea. He is on omeprazole is helping, if he misses a dose he notices a huge difference in symptoms. He will get diarrhea with stress.

## 2021-04-19 NOTE — ASSESSMENT & PLAN NOTE
This is a new condition.  About a week ago he noticed a new skin lesion in the center of his chest.  It is a small bump that slightly painful.  He does not have any associated itching.

## 2021-04-19 NOTE — PROGRESS NOTES
"Subjective:     CC: abd pain    HPI:   Diego presents today with     Abdominal pain  Onset: years  Location: lower abdomen  Duration: initially intermittent, today constant  Timing: initially only at night & waking him up, now occurring during the day sometimes  Quality: bloated, gassy  Modifying factors: relieves with farting or BM  Associated symptoms: +increased farting, unsure about burping, +mild nausea occasionally, no vomiting, +loose stools every few days, no constipation  Home treatments: omeprazole daily, rolaids    For the last 2 weeks really bad stomach cramping, diarrhea. He is on omeprazole is helping, if he misses a dose he notices a huge difference in symptoms. He will get diarrhea with stress.      Current Outpatient Medications Ordered in Epic   Medication Sig Dispense Refill   • omeprazole (PRILOSEC) 20 MG delayed-release capsule Take 1 capsule by mouth every day. 90 capsule 1     No current Epic-ordered facility-administered medications on file.       Health Maintenance: Completed    ROS:  Gen: no fevers/chills  Pulm: no sob  CV: no chest pain    Objective:     Exam:  /68 (BP Location: Right arm, Patient Position: Sitting, BP Cuff Size: Adult)   Pulse 96   Temp 36.8 °C (98.2 °F) (Temporal)   Resp 12   Ht 1.854 m (6' 1\")   Wt 92.4 kg (203 lb 12.8 oz)   SpO2 (!) 56%   BMI 26.89 kg/m²  Body mass index is 26.89 kg/m².    Gen: Alert and oriented, No apparent distress.  Neck: Neck is supple without lymphadenopathy.  Lungs: Normal effort, CTA bilaterally, no wheezes, rhonchi, or rales  CV: Regular rate and rhythm. No murmurs, rubs, or gallops.  Ext: No clubbing, cyanosis, edema.  Skin: 3 mm red papule in center of chest.    Assessment & Plan:     40 y.o. male with the following -     1. Generalized abdominal pain  This is a chronic condition, unchanged.  He has had intermittent abdominal pain for years.  It is associated with abdominal bloating, increased gas that is relieved by " flatulence or eructation.  Sometimes it wakes him up in the middle night.  For last 2 weeks is been pretty bad where he will wake up in the morning with horrific abdominal cramping that is relieved with defecation that is sometimes diarrhea.  Very rarely does he get constipation.  He does note that sometimes symptoms are definitely worse during stressful periods.  He is taking omeprazole daily which is deftly helping with the heartburn and acid reflux and he thinks is helping with the other stomach symptoms as well as he definitely notices a difference when he is off the medication.  He does meet the [place] for criteria for IBS.  He tried a low FODMAP diet for 1.5 months but did not notice much improvement in his symptoms.  Since IBS is an exclusionary diagnosis and he has not seen GI yet, will refer him to GI just to make sure a complete work-up was performed before recall this IBS.  -Continue omeprazole 20 mg daily as it is providing some relief  - REFERRAL TO GASTROENTEROLOGY    2. Skin lesion  This is an acute condition.  About a week ago he developed a skin lesion in the center of his chest.  It is a 3 mm red papule that is a little painful but no associated itching.  After discussion today he would like to watch and see if it resolves on its own or changes.  He knows to come back if it changes so I can biopsy it.    Return in about 6 months (around 10/19/2021) for Annual/wellness visit.    Please note that this dictation was created using voice recognition software. I have made every reasonable attempt to correct obvious errors, but I expect that there are errors of grammar and possibly content that I did not discover before finalizing the note.

## 2021-04-20 ENCOUNTER — APPOINTMENT (OUTPATIENT)
Dept: MEDICAL GROUP | Facility: PHYSICIAN GROUP | Age: 41
End: 2021-04-20
Payer: COMMERCIAL

## 2021-04-28 ENCOUNTER — APPOINTMENT (OUTPATIENT)
Dept: PHYSICAL THERAPY | Facility: REHABILITATION | Age: 41
End: 2021-04-28
Attending: ORTHOPAEDIC SURGERY
Payer: COMMERCIAL

## 2021-05-26 RX ORDER — ALBUTEROL SULFATE 90 UG/1
2 AEROSOL, METERED RESPIRATORY (INHALATION) EVERY 4 HOURS PRN
Qty: 1 EACH | Refills: 0 | OUTPATIENT
Start: 2021-05-26

## 2021-05-28 ENCOUNTER — TELEPHONE (OUTPATIENT)
Dept: PHYSICAL THERAPY | Facility: REHABILITATION | Age: 41
End: 2021-05-28

## 2021-05-28 NOTE — OP THERAPY DISCHARGE SUMMARY
Outpatient Physical Therapy  DISCHARGE SUMMARY NOTE      [address]  [address]  [place] [address]  Phone:  [phone]  Fax:  [phone]    Date of Visit: 05/28/2021    Patient: Diego Abarca  YOB: 1980  MRN: [id number]     Referring Provider: No referring provider defined for this encounter.   Referring Diagnosis No admission diagnoses are documented for this encounter.         Functional Assessment Used        Your patient is being discharged from Physical Therapy with the following comments:   · Goals met    Comments:  Mr. Abarca was seen for 10 PT sessions treating his R ankle post-operatively.  Overall, he progressed as expected through the healing stages. At his last visit, there was still a 10 deg limitation in ankle DF; however strength and loading tolerance was good.  He was able to return to work full duty with good tolerance. With his wife being a PT, the pt has continued to work on HEP and assist from wife for mobilizations to enhance end range DF.  No further skilled need at this time. D/C.     Maria Eugenia Miller, PT, DPT    Date: 5/28/2021

## 2021-06-30 ENCOUNTER — HOSPITAL ENCOUNTER (OUTPATIENT)
Facility: MEDICAL CENTER | Age: 41
End: 2021-06-30
Attending: PHYSICIAN ASSISTANT
Payer: COMMERCIAL

## 2021-06-30 ENCOUNTER — OFFICE VISIT (OUTPATIENT)
Dept: URGENT CARE | Facility: CLINIC | Age: 41
End: 2021-06-30
Payer: COMMERCIAL

## 2021-06-30 VITALS
SYSTOLIC BLOOD PRESSURE: 118 MMHG | WEIGHT: 196 LBS | HEART RATE: 82 BPM | OXYGEN SATURATION: 97 % | DIASTOLIC BLOOD PRESSURE: 60 MMHG | TEMPERATURE: 98.3 F | HEIGHT: 73 IN | BODY MASS INDEX: 25.98 KG/M2 | RESPIRATION RATE: 17 BRPM

## 2021-06-30 DIAGNOSIS — R52 BODY ACHES: ICD-10-CM

## 2021-06-30 DIAGNOSIS — B34.9 VIRAL ILLNESS: ICD-10-CM

## 2021-06-30 PROCEDURE — U0005 INFEC AGEN DETEC AMPLI PROBE: HCPCS

## 2021-06-30 PROCEDURE — 99213 OFFICE O/P EST LOW 20 MIN: CPT | Performed by: PHYSICIAN ASSISTANT

## 2021-06-30 PROCEDURE — U0003 INFECTIOUS AGENT DETECTION BY NUCLEIC ACID (DNA OR RNA); SEVERE ACUTE RESPIRATORY SYNDROME CORONAVIRUS 2 (SARS-COV-2) (CORONAVIRUS DISEASE [COVID-19]), AMPLIFIED PROBE TECHNIQUE, MAKING USE OF HIGH THROUGHPUT TECHNOLOGIES AS DESCRIBED BY CMS-2020-01-R: HCPCS

## 2021-06-30 ASSESSMENT — ENCOUNTER SYMPTOMS
CHILLS: 1
BLOOD IN STOOL: 0
PALPITATIONS: 0
FLANK PAIN: 0
NAUSEA: 0
COUGH: 1
MYALGIAS: 1
CONSTIPATION: 0
SHORTNESS OF BREATH: 0
ABDOMINAL PAIN: 1
FEVER: 0
DIARRHEA: 1
SORE THROAT: 1
VOMITING: 0

## 2021-06-30 ASSESSMENT — FIBROSIS 4 INDEX: FIB4 SCORE: 0.72

## 2021-06-30 NOTE — PROGRESS NOTES
Subjective:   Diego Abarca is a 41 y.o. male who presents today with   Chief Complaint   Patient presents with   • Cold Symptoms     feeling drowsy and having diarrhea x 1 week        Other  This is a new problem. The current episode started in the past 7 days. The problem occurs constantly. The problem has been unchanged. Associated symptoms include abdominal pain, chills, coughing, myalgias and a sore throat. Pertinent negatives include no chest pain, fever, nausea or vomiting. Treatments tried: Nyquil, Zicam. The treatment provided mild relief.     Sore throat started on Saturday and a sore throat has gotten somewhat better but is now having body aches and cold symptoms.  Patient states he had the [employer] vaccine at the beginning of May.  PMH:  has a past medical history of Alpha 1-antitrypsin PiMS phenotype, Chickenpox, Amharic measles, and Mumps.  MEDS: No current outpatient medications on file.  ALLERGIES: No Known Allergies  SURGHX:   Past Surgical History:   Procedure Laterality Date   • OTHER ORTHOPEDIC SURGERY Left 2002    knee     SOCHX:  reports that he has never smoked. He has never used smokeless tobacco. He reports current alcohol use of about 8.4 oz of alcohol per week. He reports current drug use. Drugs: Inhaled and Marijuana.  FH: Reviewed with patient, not pertinent to this visit.       Review of Systems   Constitutional: Positive for chills and malaise/fatigue. Negative for fever.   HENT: Positive for sore throat.    Respiratory: Positive for cough. Negative for shortness of breath.    Cardiovascular: Negative for chest pain and palpitations.   Gastrointestinal: Positive for abdominal pain and diarrhea. Negative for blood in stool, constipation, nausea and vomiting.   Genitourinary: Negative for dysuria, flank pain, frequency, hematuria and urgency.   Musculoskeletal: Positive for myalgias.        Objective:   /60 (BP Location: Left arm, Patient Position: Sitting, BP Cuff  "Size: Adult)   Pulse 82   Temp 36.8 °C (98.3 °F) (Temporal)   Resp 17   Ht 1.854 m (6' 1\")   Wt 88.9 kg (196 lb)   SpO2 97%   BMI 25.86 kg/m²   Physical Exam  Vitals and nursing note reviewed.   Constitutional:       General: He is not in acute distress.     Appearance: Normal appearance. He is well-developed. He is not ill-appearing or toxic-appearing.   HENT:      Head: Normocephalic and atraumatic.      Right Ear: Hearing, tympanic membrane and ear canal normal.      Left Ear: Hearing, tympanic membrane and ear canal normal.      Nose: Rhinorrhea present.      Mouth/Throat:      Pharynx: No oropharyngeal exudate or posterior oropharyngeal erythema.   Eyes:      Conjunctiva/sclera: Conjunctivae normal.   Cardiovascular:      Rate and Rhythm: Normal rate and regular rhythm.      Heart sounds: Normal heart sounds.   Pulmonary:      Effort: Pulmonary effort is normal. No respiratory distress.      Breath sounds: Normal breath sounds. No stridor. No wheezing, rhonchi or rales.   Abdominal:      General: There is no distension.      Tenderness: There is generalized abdominal tenderness. There is no right CVA tenderness or left CVA tenderness. Negative signs include Lopez's sign and McBurney's sign.       Musculoskeletal:      Comments: Normal movement in all 4 extremities   Lymphadenopathy:      Cervical: No cervical adenopathy.   Skin:     General: Skin is warm and dry.   Neurological:      Mental Status: He is alert.      Coordination: Coordination normal.   Psychiatric:         Mood and Affect: Mood normal.       Assessment/Plan:   Assessment    1. Body aches  - SARS-CoV-2 PCR (24 hour In-House): Collect NP swab in VTM; Future    2. Viral illness    Patient is tender in the lower abdomen but he denies any new symptoms or worsening symptoms from what he experiences at baseline.  He states he will continue following up with GI specialist.  Did discuss red flag signs and ER precautions if any worsening of " abdominal symptoms.  His symptoms at this time are most consistent with viral illness and no indication for antibiotics.  Discussed with patient that flu test is not indicated given duration of his symptoms at this time.  Discussed CDC guidelines including self isolation at home.   Patient encouraged to get plenty of rest, use OTC tylenol for pain/fever, and drink plenty of fluids.    Differential diagnosis, natural history, supportive care, and indications for immediate follow-up discussed.   Patient given instructions and understanding of medications and treatment.    If not improving in 3-5 days, F/U with PCP or return to UC if symptoms worsen.  Strict ER precautions given.  Patient agreeable to plan.  Greater than 20 minutes were spent reviewing patient's chart, examining and obtaining history from patient, and discussing plan of care.     Please note that this dictation was created using voice recognition software. I have made every reasonable attempt to correct obvious errors, but I expect that there are errors of grammar and possibly content that I did not discover before finalizing the note.    Sebastien Mendiola PA-C

## 2021-07-01 DIAGNOSIS — R52 BODY ACHES: ICD-10-CM

## 2021-07-02 ENCOUNTER — OFFICE VISIT (OUTPATIENT)
Dept: URGENT CARE | Facility: CLINIC | Age: 41
End: 2021-07-02
Payer: COMMERCIAL

## 2021-07-02 ENCOUNTER — TELEPHONE (OUTPATIENT)
Dept: URGENT CARE | Facility: CLINIC | Age: 41
End: 2021-07-02

## 2021-07-02 ENCOUNTER — HOSPITAL ENCOUNTER (OUTPATIENT)
Facility: MEDICAL CENTER | Age: 41
End: 2021-07-02
Attending: INTERNAL MEDICINE
Payer: COMMERCIAL

## 2021-07-02 ENCOUNTER — HOSPITAL ENCOUNTER (OUTPATIENT)
Dept: LAB | Facility: MEDICAL CENTER | Age: 41
End: 2021-07-02
Attending: INTERNAL MEDICINE
Payer: COMMERCIAL

## 2021-07-02 VITALS
DIASTOLIC BLOOD PRESSURE: 70 MMHG | HEART RATE: 70 BPM | BODY MASS INDEX: 25.58 KG/M2 | TEMPERATURE: 98.1 F | OXYGEN SATURATION: 99 % | RESPIRATION RATE: 14 BRPM | WEIGHT: 193 LBS | SYSTOLIC BLOOD PRESSURE: 110 MMHG | HEIGHT: 73 IN

## 2021-07-02 DIAGNOSIS — R19.7 DIARRHEA, UNSPECIFIED TYPE: ICD-10-CM

## 2021-07-02 DIAGNOSIS — J45.21 MILD INTERMITTENT REACTIVE AIRWAY DISEASE WITH ACUTE EXACERBATION: ICD-10-CM

## 2021-07-02 DIAGNOSIS — J32.9 RHINOSINUSITIS: ICD-10-CM

## 2021-07-02 DIAGNOSIS — R10.9 ABDOMINAL PAIN, UNSPECIFIED ABDOMINAL LOCATION: ICD-10-CM

## 2021-07-02 LAB
APPEARANCE UR: CLEAR
BILIRUB UR STRIP-MCNC: NEGATIVE MG/DL
C DIFF DNA SPEC QL NAA+PROBE: NEGATIVE
C DIFF TOX GENS STL QL NAA+PROBE: NEGATIVE
COLOR UR AUTO: YELLOW
G LAMBLIA+C PARVUM AG STL QL RAPID: NORMAL
GLUCOSE UR STRIP.AUTO-MCNC: NEGATIVE MG/DL
KETONES UR STRIP.AUTO-MCNC: NEGATIVE MG/DL
LEUKOCYTE ESTERASE UR QL STRIP.AUTO: NEGATIVE
NITRITE UR QL STRIP.AUTO: NEGATIVE
PH UR STRIP.AUTO: 5 [PH] (ref 5–8)
PROT UR QL STRIP: NEGATIVE MG/DL
RBC UR QL AUTO: NEGATIVE
SIGNIFICANT IND 70042: NORMAL
SITE SITE: NORMAL
SOURCE SOURCE: NORMAL
SP GR UR STRIP.AUTO: 1.02
UROBILINOGEN UR STRIP-MCNC: 0.2 MG/DL

## 2021-07-02 PROCEDURE — 87329 GIARDIA AG IA: CPT

## 2021-07-02 PROCEDURE — 83986 ASSAY PH BODY FLUID NOS: CPT

## 2021-07-02 PROCEDURE — 83993 ASSAY FOR CALPROTECTIN FECAL: CPT

## 2021-07-02 PROCEDURE — 87899 AGENT NOS ASSAY W/OPTIC: CPT | Mod: 91

## 2021-07-02 PROCEDURE — 87045 FECES CULTURE AEROBIC BACT: CPT

## 2021-07-02 PROCEDURE — 87493 C DIFF AMPLIFIED PROBE: CPT

## 2021-07-02 PROCEDURE — 86038 ANTINUCLEAR ANTIBODIES: CPT

## 2021-07-02 PROCEDURE — 36415 COLL VENOUS BLD VENIPUNCTURE: CPT

## 2021-07-02 PROCEDURE — 83516 IMMUNOASSAY NONANTIBODY: CPT

## 2021-07-02 PROCEDURE — 81002 URINALYSIS NONAUTO W/O SCOPE: CPT | Performed by: EMERGENCY MEDICINE

## 2021-07-02 PROCEDURE — 82784 ASSAY IGA/IGD/IGG/IGM EACH: CPT

## 2021-07-02 PROCEDURE — 99203 OFFICE O/P NEW LOW 30 MIN: CPT | Performed by: EMERGENCY MEDICINE

## 2021-07-02 PROCEDURE — 87328 CRYPTOSPORIDIUM AG IA: CPT

## 2021-07-02 RX ORDER — FLUTICASONE PROPIONATE 220 UG/1
1 AEROSOL, METERED RESPIRATORY (INHALATION) 2 TIMES DAILY
Qty: 12 G | Refills: 0 | Status: SHIPPED | OUTPATIENT
Start: 2021-07-02 | End: 2021-07-02 | Stop reason: CLARIF

## 2021-07-02 RX ORDER — PREDNISONE 20 MG/1
40 TABLET ORAL DAILY
Qty: 10 TABLET | Refills: 0 | Status: SHIPPED | OUTPATIENT
Start: 2021-07-02 | End: 2021-11-10

## 2021-07-02 ASSESSMENT — ENCOUNTER SYMPTOMS
CHANGE IN BOWEL HABIT: 1
ABDOMINAL PAIN: 1
HEADACHES: 1
WHEEZING: 1
ROS GI COMMENTS: 1
ANOREXIA: 0
RHINORRHEA: 1
BLOOD IN STOOL: 0
SORE THROAT: 1
VOMITING: 0
SINUS PAIN: 1
DIARRHEA: 1
NAUSEA: 1
CHILLS: 1
COUGH: 1

## 2021-07-02 ASSESSMENT — FIBROSIS 4 INDEX: FIB4 SCORE: 0.72

## 2021-07-02 NOTE — PROGRESS NOTES
Subjective:      Diego Abarca is a 41 y.o. male who presents with GI Problem (Stomach ache, waxes and wanes. With aches, has to have a bowel movement. Sinus issue, post nasal drip, runny nose, mucous. Lingering headache at that worse at night. Onset 1 week.)            GI Problem  This is a chronic problem. The problem has been waxing and waning. Associated symptoms include abdominal pain, a change in bowel habit, chills, congestion, coughing, headaches, nausea and a sore throat. Pertinent negatives include no anorexia, rash, urinary symptoms or vomiting.   URI   This is a new problem. The current episode started in the past 7 days. The problem has been gradually improving. Maximum temperature: subjective, resolved. Associated symptoms include abdominal pain, congestion, coughing, diarrhea, headaches, nausea, a plugged ear sensation, rhinorrhea, sinus pain, a sore throat and wheezing. Pertinent negatives include no dysuria, ear pain, rash or vomiting. He has tried inhaler use for the symptoms. The treatment provided moderate relief.   PMH RAD.  Notes coughing episodes, increased frequency of albuterol use this week.  COVID-19 vaccination completed about a month ago.  Covid test negative this week.  Just saw gastroenterologist for chronic diarrheal issue; testing ordered.  Review of Systems   Constitutional: Positive for chills and malaise/fatigue.        Initially, resolving   HENT: Positive for congestion, rhinorrhea, sinus pain and sore throat. Negative for ear pain.    Respiratory: Positive for cough and wheezing.    Gastrointestinal: Positive for abdominal pain, change in bowel habit, diarrhea and nausea. Negative for anorexia, blood in stool and vomiting.   Genitourinary: Negative for dysuria, frequency, hematuria and urgency.   Skin: Negative for rash.   Neurological: Positive for headaches.       Past Medical History:   Diagnosis Date   • Alpha 1-antitrypsin PiMS phenotype    • Chickenpox    • Arabic  "measles    • Mumps      Past Surgical History:   Procedure Laterality Date   • OTHER ORTHOPEDIC SURGERY Left 2002    knee      Allergy:  Patient has no known allergies.   No current outpatient medications on file.   family history includes Cancer in his child; Diabetes in his maternal grandfather; Heart Disease in his maternal grandfather; No Known Problems in his mother; Stroke in an other family member.   Social History     Tobacco Use   • Smoking status: Never Smoker   • Smokeless tobacco: Never Used   Vaping Use   • Vaping Use: Never used   Substance Use Topics   • Alcohol use: Yes     Alcohol/week: 8.4 oz     Types: 14 Cans of beer per week   • Drug use: Yes     Types: Inhaled, Marijuana     Comment: marijuana daily       Objective:     /70 (BP Location: Left arm, Patient Position: Sitting, BP Cuff Size: Adult)   Pulse 70   Temp 36.7 °C (98.1 °F) (Temporal)   Resp 14   Ht 1.854 m (6' 1\")   Wt 87.5 kg (193 lb)   SpO2 99%   BMI 25.46 kg/m²      Physical Exam  Constitutional:       General: He is not in acute distress.     Appearance: He is well-developed. He is not ill-appearing or toxic-appearing.   HENT:      Head: Normocephalic.      Jaw: No trismus.      Right Ear: Tympanic membrane and ear canal normal.      Left Ear: Tympanic membrane and ear canal normal.      Nose: Mucosal edema and rhinorrhea present.      Right Sinus: Maxillary sinus tenderness present.      Left Sinus: Maxillary sinus tenderness present.      Mouth/Throat:      Pharynx: Uvula midline. No oropharyngeal exudate or posterior oropharyngeal erythema.   Eyes:      Conjunctiva/sclera: Conjunctivae normal.   Neck:      Trachea: Trachea normal.   Cardiovascular:      Rate and Rhythm: Normal rate and regular rhythm.      Heart sounds: Normal heart sounds.   Pulmonary:      Effort: Pulmonary effort is normal.      Breath sounds: Normal breath sounds.   Abdominal:      General: Abdomen is flat. Bowel sounds are normal.      " Palpations: Abdomen is soft. There is no mass.      Tenderness: There is generalized abdominal tenderness. There is no right CVA tenderness, left CVA tenderness or guarding.   Musculoskeletal:      Cervical back: Neck supple.   Lymphadenopathy:      Cervical: No cervical adenopathy.      Lower Body: No right inguinal adenopathy. No left inguinal adenopathy.   Skin:     General: Skin is warm and dry.   Neurological:      Mental Status: He is alert.   Psychiatric:         Behavior: Behavior is cooperative.                        Assessment/Plan:        1. Mild intermittent reactive airway disease with acute exacerbation  Continue ASHLEY  Rx prednisone, QVAR    2. Rhinosinusitis  Recommended nasal saline irrigation daily as needed, OTC inhaled nasal steroid daily  Will avoid antibiotic therapy at this time.    3. Abdominal pain, unspecified abdominal location  normal- POCT Urinalysis    4. Diarrhea, unspecified type  F/U GI as planned  Plan to avoid oral OTC cough cold medications.

## 2021-07-02 NOTE — TELEPHONE ENCOUNTER
Dr. Chairez,    The patient called in regards to the inhaler that you prescribed. Per the patient, with insurance it is still $320.00 and the patient cannot afford it. Is there a different inhaler that you can prescribe?    Please adviseRosa

## 2021-07-03 LAB
E COLI SXT1+2 STL IA: NORMAL
SIGNIFICANT IND 70042: NORMAL
SITE SITE: NORMAL
SOURCE SOURCE: NORMAL

## 2021-07-04 LAB
BACTERIA STL CULT: NORMAL
C JEJUNI+C COLI AG STL QL: NORMAL
E COLI SXT1+2 STL IA: NORMAL
IGA SERPL-MCNC: 219 MG/DL (ref 68–408)
NUCLEAR IGG SER QL IA: NORMAL
SIGNIFICANT IND 70042: NORMAL
SITE SITE: NORMAL
SOURCE SOURCE: NORMAL

## 2021-07-05 LAB
MISCELLANEOUS LAB RESULT MISCLAB: NORMAL
TTG IGA SER IA-ACNC: <2 U/ML (ref 0–3)

## 2021-07-07 LAB — CALPROTECTIN STL-MCNT: <5 UG/G

## 2021-09-15 ENCOUNTER — APPOINTMENT (OUTPATIENT)
Dept: MEDICAL GROUP | Facility: PHYSICIAN GROUP | Age: 41
End: 2021-09-15
Payer: COMMERCIAL

## 2021-11-10 ENCOUNTER — OFFICE VISIT (OUTPATIENT)
Dept: MEDICAL GROUP | Facility: PHYSICIAN GROUP | Age: 41
End: 2021-11-10
Payer: COMMERCIAL

## 2021-11-10 VITALS
BODY MASS INDEX: 26.14 KG/M2 | RESPIRATION RATE: 20 BRPM | HEIGHT: 73 IN | OXYGEN SATURATION: 95 % | TEMPERATURE: 97.9 F | HEART RATE: 64 BPM | SYSTOLIC BLOOD PRESSURE: 102 MMHG | WEIGHT: 197.2 LBS | DIASTOLIC BLOOD PRESSURE: 56 MMHG

## 2021-11-10 DIAGNOSIS — R07.89 COSTOCHONDRAL CHEST PAIN: ICD-10-CM

## 2021-11-10 DIAGNOSIS — Z23 NEED FOR VACCINATION: ICD-10-CM

## 2021-11-10 DIAGNOSIS — K21.00 GASTROESOPHAGEAL REFLUX DISEASE WITH ESOPHAGITIS WITHOUT HEMORRHAGE: ICD-10-CM

## 2021-11-10 DIAGNOSIS — R10.9 ABDOMINAL PAIN, UNSPECIFIED ABDOMINAL LOCATION: ICD-10-CM

## 2021-11-10 PROBLEM — K21.9 ACID REFLUX: Status: RESOLVED | Noted: 2020-06-08 | Resolved: 2021-11-10

## 2021-11-10 PROCEDURE — 90472 IMMUNIZATION ADMIN EACH ADD: CPT | Performed by: FAMILY MEDICINE

## 2021-11-10 PROCEDURE — 90715 TDAP VACCINE 7 YRS/> IM: CPT | Performed by: FAMILY MEDICINE

## 2021-11-10 PROCEDURE — 99213 OFFICE O/P EST LOW 20 MIN: CPT | Mod: 25 | Performed by: FAMILY MEDICINE

## 2021-11-10 PROCEDURE — 90686 IIV4 VACC NO PRSV 0.5 ML IM: CPT | Performed by: FAMILY MEDICINE

## 2021-11-10 PROCEDURE — 90471 IMMUNIZATION ADMIN: CPT | Performed by: FAMILY MEDICINE

## 2021-11-10 RX ORDER — OMEPRAZOLE MAGNESIUM 10 MG/1
1 GRANULE, DELAYED RELEASE ORAL DAILY
COMMUNITY
End: 2022-02-18

## 2021-11-11 NOTE — ASSESSMENT & PLAN NOTE
Chronic, stable.  He has established with GI and he had an EGD showing mild reflux esophagitis without Dickson's esophagus.  He is on over-the-counter Prilosec but is unsure of the dose.  He still slightly tender in the epigastrium so I have asked him to contact me regarding his dosing to see if it needs to be adjusted.  I have also asked him to follow back up with GI as well.

## 2021-11-11 NOTE — PROGRESS NOTES
"Subjective:     CC: abdominal pain    HPI:   Diego presents today with    Problem   Gastroesophageal Reflux Disease With Esophagitis    EGD 8/2021 - mild reflux esophagitis, no Dickson's; taking OTC prilosec (unsure dose).        Costochondral Chest Pain    Left sharp/stabbing chest pain, lasts about 30mins, 3x/day for several weeks now, with & without exertion; no diaphoresis, no burning sensation. Stressor from work.       Abdominal Pain    Chronic.  He has had lower abdominal pain for years.  Initially only at night and was waking up but now occurring during the day sometimes.  He has had an increase of the pain over the last couple of weeks in the left lower abdomen.  Previously treated him with omeprazole and he did notice a big difference in his symptoms.   Describes LLQ pain as 'uncomfortable & hot', feels pain right before he stools; denies constipation, diarrhea, bloody stools. No fever/chills. Occasional epigastric pain. Denies ETOH.      Acid Reflux (Resolved)       Health Maintenance: Completed    Objective:     Exam:  /56 (BP Location: Left arm, Patient Position: Sitting, BP Cuff Size: Adult)   Pulse 64   Temp 36.6 °C (97.9 °F) (Temporal)   Resp 20   Ht 1.854 m (6' 1\")   Wt 89.4 kg (197 lb 3.2 oz)   SpO2 95%   BMI 26.02 kg/m²  Body mass index is 26.02 kg/m².    Physical Exam  Constitutional:       Appearance: Normal appearance.   Cardiovascular:      Rate and Rhythm: Normal rate and regular rhythm.      Heart sounds: Normal heart sounds.   Pulmonary:      Effort: Pulmonary effort is normal.      Breath sounds: Normal breath sounds.   Abdominal:      General: Abdomen is flat. Bowel sounds are normal.      Palpations: Abdomen is soft.      Tenderness: There is abdominal tenderness in the epigastric area and left lower quadrant.       Musculoskeletal:      Cervical back: Normal range of motion and neck supple.   Neurological:      Mental Status: He is alert.       Assessment & Plan:     41 " y.o. male with the following -     Problem List Items Addressed This Visit     Abdominal pain     Chronic, stable.  For years has had lower abdominal pain.  3 weeks ago it got worse but has been better for the last week.  No other associated symptoms to be concerned about diverticulitis.  Carnett's was positive on exam today so it may be due to an abdominal cutaneous nerve entrapment.  He has already seen GI for some abdominal pain and have encouraged her to follow-up with them as well.         Gastroesophageal reflux disease with esophagitis     Chronic, stable.  He has established with GI and he had an EGD showing mild reflux esophagitis without Dickson's esophagus.  He is on over-the-counter Prilosec but is unsure of the dose.  He still slightly tender in the epigastrium so I have asked him to contact me regarding his dosing to see if it needs to be adjusted.  I have also asked him to follow back up with GI as well.         Costochondral chest pain     Chronic, stable.  He states for years he has had intermittent left-sided sharp, stabbing pain.  In the last couple weeks it is become much more frequent and occurring 3 times daily.  No other symptoms to be concerning for ACS.  On exam he is tender to palpation in the intercostal spaces on the left side so I highly suspect this is costochondritis.  Handout was provided for him today.           Other Visit Diagnoses     Need for vaccination        Relevant Orders    INFLUENZA VACCINE QUAD INJ (PF)    Tdap Vaccine =>8YO IM          I spent a total of 23 minutes with record review, exam, communication with the patient, and documentation of this encounter.      Return in about 6 months (around 5/10/2022) for Annual/wellness visit.    Please note that this dictation was created using voice recognition software. I have made every reasonable attempt to correct obvious errors, but I expect that there are errors of grammar and possibly content that I did not discover before  finalizing the note.

## 2021-11-11 NOTE — ASSESSMENT & PLAN NOTE
Chronic, stable.  For years has had lower abdominal pain.  3 weeks ago it got worse but has been better for the last week.  No other associated symptoms to be concerned about diverticulitis.  Carnett's was positive on exam today so it may be due to an abdominal cutaneous nerve entrapment.  He has already seen GI for some abdominal pain and have encouraged her to follow-up with them as well.  In the meantime, return precautions were provided.

## 2021-11-11 NOTE — ASSESSMENT & PLAN NOTE
Chronic, stable.  He states for years he has had intermittent left-sided sharp, stabbing pain.  In the last couple weeks it is become much more frequent and occurring 3 times daily.  No other symptoms to be concerning for ACS.  On exam he is tender to palpation in the intercostal spaces on the left side so I highly suspect this is costochondritis.  Handout was provided for him today.

## 2021-11-11 NOTE — PATIENT INSTRUCTIONS
Costochondritis  Costochondritis is swelling and irritation (inflammation) of the tissue (cartilage) that connects your ribs to your breastbone (sternum). This causes pain in the front of your chest. Usually, the pain:  · Starts gradually.  · Is in more than one rib.  This condition usually goes away on its own over time.  Follow these instructions at home:  · Do not do anything that makes your pain worse.  · If directed, put ice on the painful area:  ? Put ice in a plastic bag.  ? Place a towel between your skin and the bag.  ? Leave the ice on for 20 minutes, 2-3 times a day.  · If directed, put heat on the affected area as often as told by your doctor. Use the heat source that your doctor tells you to use, such as a moist heat pack or a heating pad.  ? Place a towel between your skin and the heat source.  ? Leave the heat on for 20-30 minutes.  ? Take off the heat if your skin turns bright red. This is very important if you cannot feel pain, heat, or cold. You may have a greater risk of getting burned.  · Take over-the-counter and prescription medicines only as told by your doctor.  · Return to your normal activities as told by your doctor. Ask your doctor what activities are safe for you.  · Keep all follow-up visits as told by your doctor. This is important.  Contact a doctor if:  · You have chills or a fever.  · Your pain does not go away or it gets worse.  · You have a cough that does not go away.  Get help right away if:  · You are short of breath.  This information is not intended to replace advice given to you by your health care provider. Make sure you discuss any questions you have with your health care provider.  Document Released: 06/05/2009 Document Revised: 01/02/2019 Document Reviewed: 04/12/2017  Elsevier Patient Education © 2020 [employer]

## 2021-11-30 ENCOUNTER — HOSPITAL ENCOUNTER (EMERGENCY)
Facility: MEDICAL CENTER | Age: 41
End: 2021-11-30
Attending: EMERGENCY MEDICINE
Payer: COMMERCIAL

## 2021-11-30 ENCOUNTER — TELEPHONE (OUTPATIENT)
Dept: MEDICAL GROUP | Facility: PHYSICIAN GROUP | Age: 41
End: 2021-11-30

## 2021-11-30 VITALS
OXYGEN SATURATION: 99 % | HEIGHT: 73 IN | WEIGHT: 191.58 LBS | RESPIRATION RATE: 18 BRPM | BODY MASS INDEX: 25.39 KG/M2 | HEART RATE: 60 BPM | DIASTOLIC BLOOD PRESSURE: 94 MMHG | TEMPERATURE: 98 F | SYSTOLIC BLOOD PRESSURE: 152 MMHG

## 2021-11-30 DIAGNOSIS — U07.1 COVID-19: ICD-10-CM

## 2021-11-30 LAB
SARS-COV-2 RNA RESP QL NAA+PROBE: DETECTED
SPECIMEN SOURCE: ABNORMAL

## 2021-11-30 PROCEDURE — M0243 CASIRIVI AND IMDEVI INFUSION: HCPCS

## 2021-11-30 PROCEDURE — 99283 EMERGENCY DEPT VISIT LOW MDM: CPT

## 2021-11-30 PROCEDURE — 700111 HCHG RX REV CODE 636 W/ 250 OVERRIDE (IP): Performed by: EMERGENCY MEDICINE

## 2021-11-30 PROCEDURE — U0005 INFEC AGEN DETEC AMPLI PROBE: HCPCS

## 2021-11-30 PROCEDURE — U0003 INFECTIOUS AGENT DETECTION BY NUCLEIC ACID (DNA OR RNA); SEVERE ACUTE RESPIRATORY SYNDROME CORONAVIRUS 2 (SARS-COV-2) (CORONAVIRUS DISEASE [COVID-19]), AMPLIFIED PROBE TECHNIQUE, MAKING USE OF HIGH THROUGHPUT TECHNOLOGIES AS DESCRIBED BY CMS-2020-01-R: HCPCS

## 2021-11-30 RX ADMIN — CASIRIVIMAB AND IMDEVIMAB 300 MG: 600; 600 INJECTION, SOLUTION, CONCENTRATE INTRAVENOUS at 09:10

## 2021-11-30 NOTE — ED NOTES
Pt verbally consented to regeneron treatment. Fact sheet was given. Agrees to be observed for 1 Hr. Post medication.

## 2021-11-30 NOTE — ED TRIAGE NOTES
"Chief Complaint   Patient presents with   • Coronavirus Screening     Pt states took a home test for Covid came back positive, started feeling cold symptoms last Thursday     /89   Pulse (!) 57   Temp 36.8 °C (98.3 °F) (Temporal)   Resp 17   Ht 1.854 m (6' 1\")   Wt 86.9 kg (191 lb 9.3 oz)   SpO2 99%   BMI 25.28 kg/m²     Has this patient been vaccinated for COVID YES  If not, would they like to be vaccinated while in the ER if eligible?  NA  Would the patient like to speak with the ERP about the possibility of receiving the COVID vaccine today before making a decision? NA    Pt ambulated to ED for c/o positive home Covid test.  Pt states developed cold symptoms last Thursday, saw a doctor and was started on antibx for ear infection.  Pt states started to feel short of breath last night and this am.  RA SPo2 99%.      "

## 2021-11-30 NOTE — TELEPHONE ENCOUNTER
Phone conversation with patient in follow-up to his ED visit on 11/30/21, he was treated with Regeneron in the ED.  Patient reported he feels okay now.  Scheduled him an ED follow-up visit with his primary care provider for 12/9/21.

## 2021-11-30 NOTE — DISCHARGE INSTRUCTIONS
Return for worsening shortness of breath, fever, cough, or other concerns.  Follow-up with your doctor.

## 2021-11-30 NOTE — ED PROVIDER NOTES
ED Provider Note    CHIEF COMPLAINT  Chief Complaint   Patient presents with   • Coronavirus Screening     Pt states took a home test for Covid came back positive, started feeling cold symptoms last Thursday       HPI  Diego Abarca is a 41 y.o. male who presents to the emergency department complaining of Covid symptoms.  The patient had a positive home Covid test.  He recently spent a sleeping weekend with family who are positive for Covid.  He comes in complaining of congestion, sore throat, cough and mild dyspnea.  He wants to make sure he is okay.  He has a history of asthma requiring steroids but never hospitalization.  The patient states he has been achy and feeling unwell.  He has been vaccinated for COVID.  He is inquiring about steroids for the monoclonal antibody.  Denies any purulent sputum or hemoptysis.  Did recently have long travel but can drive for more than 3-3 and half hours without getting up and walking around.  He is diffusely achy does not have focal calf swelling or calf pain.  No history of PE or DVT.  No chest pain or pressure.  Denies any other aggravating leaving factors or associated complaints.  He is day 6 of the symptoms.    He did have some ear pain.  Stop the urgent care while driving home started on cefdinir for otitis media.    He is requesting a repeat Covid test.  He did have a positive home Covid test.    REVIEW OF SYSTEMS  See HPI for further details. All other systems are negative.    PAST MEDICAL HISTORY  Past Medical History:   Diagnosis Date   • Alpha 1-antitrypsin PiMS phenotype    • Asthma    • Chickenpox    • Bengali measles    • Mumps        FAMILY HISTORY  Family History   Problem Relation Age of Onset   • No Known Problems Mother    • Diabetes Maternal Grandfather    • Heart Disease Maternal Grandfather    • Stroke Other    • Cancer Child         leukemia type B   • Drug abuse Neg Hx    • Alcohol abuse Neg Hx        SOCIAL HISTORY  Social History      Socioeconomic History   • Marital status:      Spouse name: Not on file   • Number of children: Not on file   • Years of education: Not on file   • Highest education level: Not on file   Occupational History   • Not on file   Tobacco Use   • Smoking status: Never Smoker   • Smokeless tobacco: Never Used   Vaping Use   • Vaping Use: Never used   Substance and Sexual Activity   • Alcohol use: Yes     Alcohol/week: 8.4 oz     Types: 14 Cans of beer per week   • Drug use: Yes     Types: Inhaled, Marijuana     Comment: Marijuana   • Sexual activity: Yes     Partners: Female     Comment: wife   Other Topics Concern   • Not on file   Social History Narrative   • Not on file     Social Determinants of Health     Financial Resource Strain:    • Difficulty of Paying Living Expenses: Not on file   Food Insecurity:    • Worried About Running Out of Food in the Last Year: Not on file   • Ran Out of Food in the Last Year: Not on file   Transportation Needs:    • Lack of Transportation (Medical): Not on file   • Lack of Transportation (Non-Medical): Not on file   Physical Activity:    • Days of Exercise per Week: Not on file   • Minutes of Exercise per Session: Not on file   Stress:    • Feeling of Stress : Not on file   Social Connections:    • Frequency of Communication with Friends and Family: Not on file   • Frequency of Social Gatherings with Friends and Family: Not on file   • Attends Yazdanism Services: Not on file   • Active Member of Clubs or Organizations: Not on file   • Attends Club or Organization Meetings: Not on file   • Marital Status: Not on file   Intimate Partner Violence:    • Fear of Current or Ex-Partner: Not on file   • Emotionally Abused: Not on file   • Physically Abused: Not on file   • Sexually Abused: Not on file   Housing Stability:    • Unable to Pay for Housing in the Last Year: Not on file   • Number of Places Lived in the Last Year: Not on file   • Unstable Housing in the Last Year: Not  "on file       SURGICAL HISTORY  Past Surgical History:   Procedure Laterality Date   • OTHER ORTHOPEDIC SURGERY Left 2002    knee       CURRENT MEDICATIONS  Home Medications    **Home medications have not yet been reviewed for this encounter**         ALLERGIES  No Known Allergies    PHYSICAL EXAM  VITAL SIGNS: /89   Pulse (!) 57   Temp 36.8 °C (98.3 °F) (Temporal)   Resp 17   Ht 1.854 m (6' 1\")   Wt 86.9 kg (191 lb 9.3 oz)   SpO2 99%   BMI 25.28 kg/m²      Constitutional: Well developed, Well nourished, No acute distress, Non-toxic appearance.   HENT: Normocephalic, Atraumatic, Bilateral external ears normal, Oropharynx moist, No oral exudates, Nose normal.  Left TM is erythematous.  Eyes: PERRL, EOMI, Conjunctiva normal, No discharge.   Neck: Normal range of motion,  Cardiovascular: Normal heart rate, Normal rhythm, No murmurs, No rubs, No gallops.   Thorax & Lungs: Normal breath sounds, No respiratory distress, No wheezing  Abdomen: Bowel sounds normal, Soft, No tenderness   Skin: Warm, Dry, No erythema, No rash.   .   Musculoskeletal: Good range of motion in all major joints.   Neurologic: Alert, No focal deficits noted.   Psychiatric: Affect normal      RADIOLOGY/PROCEDURES  none    COURSE & MEDICAL DECISION MAKING  Pertinent Labs & Imaging studies reviewed. (See chart for details)    The patient presents with cough, congestion, Covid exposure in close contact with family members and positive Covid test at home.  The patient is requesting therapy with a monoclonal antibody.  He has cough, achiness and mild shortness of breath.      He did recently have a long drive so considered pulmonary embolism.  The patient has no unilateral calf pain or swelling.  He was ambulatory within 3 and 3/2 hours each time.  Does not have pleuritic chest pain, he is PERC negative and he has a clear-cut alternative diagnosis with his cough, congestion, and COVID-19.  I think he likely has COVID-19.    With clear lungs " normal vital signs and normal oxygen saturation I do not see an indication for x-ray or blood test.  The patient is concerned about progression of COVID-19 and is requesting monoclonal antibody.      I discussed the monoclonal antibody with the patient including but this experimental under emergency use authorization.  I explained that he may or may not feel any improvement.  I provided him with the appropriate documentation for review.    Subsequent to this review he has an additional questions about possible side effects.  Stated side effects and reactions always possible.  Is not a great deal of data but the data we have suggested is low risk.  He is agreeable to infusion.  Be given the monoclonal antibodies and observed here in the ED per protocol.    Patient be discharged after a period Observation.  Questions answered is agreeable to plan.  He is requesting an additional Covid testing for some other reasons.      The patient was noted to have elevated blood pressure while in the ER and was counseled to see their doctor within one wee to have this rechecked.    Xiomara Montague M.D.  [address]  [facility] [address]  [phone]              FINAL IMPRESSION  1. COVID-19         2.   3.         Electronically signed by: Zeyad Hernandez M.D., 11/30/2021 8:36 AM

## 2021-12-03 ENCOUNTER — HOSPITAL ENCOUNTER (EMERGENCY)
Facility: MEDICAL CENTER | Age: 41
End: 2021-12-03
Attending: EMERGENCY MEDICINE
Payer: COMMERCIAL

## 2021-12-03 VITALS
WEIGHT: 192.68 LBS | TEMPERATURE: 98 F | DIASTOLIC BLOOD PRESSURE: 100 MMHG | OXYGEN SATURATION: 99 % | RESPIRATION RATE: 16 BRPM | SYSTOLIC BLOOD PRESSURE: 130 MMHG | HEIGHT: 73 IN | HEART RATE: 52 BPM | BODY MASS INDEX: 25.54 KG/M2

## 2021-12-03 DIAGNOSIS — U07.1 COVID-19: ICD-10-CM

## 2021-12-03 DIAGNOSIS — H92.02 LEFT EAR PAIN: ICD-10-CM

## 2021-12-03 PROCEDURE — 700111 HCHG RX REV CODE 636 W/ 250 OVERRIDE (IP): Performed by: EMERGENCY MEDICINE

## 2021-12-03 PROCEDURE — 99283 EMERGENCY DEPT VISIT LOW MDM: CPT

## 2021-12-03 RX ADMIN — PREDNISONE 60 MG: 10 TABLET ORAL at 09:00

## 2021-12-03 NOTE — DISCHARGE INSTRUCTIONS
Follow-up with ear nose throat physician if not better next week.  Please contact your primary doctor for referral to ear nose throat or check with your insurance provider.    Worsening symptoms go to [facility]

## 2021-12-03 NOTE — ED TRIAGE NOTES
Chief Complaint   Patient presents with   • Earache     left ear ache, clogged and painful, recent DX of Covid, co of severe tinnitis. He started and stopped antibiotics for this but now does not feel better.

## 2021-12-03 NOTE — ED PROVIDER NOTES
ED Provider Note    CHIEF COMPLAINT  Chief Complaint   Patient presents with   • Earache     left ear ache, clogged and painful, recent DX of Covid, co of severe tinnitis. He started and stopped antibiotics for this but now does not feel better.       HPI  Diego Abarca is a 41 y.o. male who presents with a left ear pain.  He states he drove back from [place] 5 days ago, stopped in [place] to be seen.  He was started on Omnicef which she continues.  No ear drainage.  Intermittent tinnitus.  Patient tested positive for COVID-19 5 days ago, states he is fully vaccinated.  Intermittent cough, no shortness of breath.  He does use Q-tips, denies other trauma to the ears.  No history of cerumen impaction.    REVIEW OF SYSTEMS  Constitutional: Malaise  Respiratory: Cough  ENT left ear pain  Gastrointestinal: No abdominal pain  Musculoskeletal: No back pain    PAST MEDICAL HISTORY  Past Medical History:   Diagnosis Date   • Alpha 1-antitrypsin PiMS phenotype    • Asthma    • Chickenpox    • Occitan measles    • Mumps        FAMILY HISTORY  Family History   Problem Relation Age of Onset   • No Known Problems Mother    • Diabetes Maternal Grandfather    • Heart Disease Maternal Grandfather    • Stroke Other    • Cancer Child         leukemia type B   • Drug abuse Neg Hx    • Alcohol abuse Neg Hx        SOCIAL HISTORY  Social History     Socioeconomic History   • Marital status:      Spouse name: Not on file   • Number of children: Not on file   • Years of education: Not on file   • Highest education level: Not on file   Occupational History   • Not on file   Tobacco Use   • Smoking status: Never Smoker   • Smokeless tobacco: Never Used   Vaping Use   • Vaping Use: Never used   Substance and Sexual Activity   • Alcohol use: Yes     Alcohol/week: 8.4 oz     Types: 14 Cans of beer per week   • Drug use: Yes     Types: Inhaled, Marijuana     Comment: Marijuana   • Sexual activity: Yes     Partners: Female     Comment:  wife   Other Topics Concern   • Not on file   Social History Narrative   • Not on file     Social Determinants of Health     Financial Resource Strain:    • Difficulty of Paying Living Expenses: Not on file   Food Insecurity:    • Worried About Running Out of Food in the Last Year: Not on file   • Ran Out of Food in the Last Year: Not on file   Transportation Needs:    • Lack of Transportation (Medical): Not on file   • Lack of Transportation (Non-Medical): Not on file   Physical Activity:    • Days of Exercise per Week: Not on file   • Minutes of Exercise per Session: Not on file   Stress:    • Feeling of Stress : Not on file   Social Connections:    • Frequency of Communication with Friends and Family: Not on file   • Frequency of Social Gatherings with Friends and Family: Not on file   • Attends Restoration Services: Not on file   • Active Member of Clubs or Organizations: Not on file   • Attends Club or Organization Meetings: Not on file   • Marital Status: Not on file   Intimate Partner Violence:    • Fear of Current or Ex-Partner: Not on file   • Emotionally Abused: Not on file   • Physically Abused: Not on file   • Sexually Abused: Not on file   Housing Stability:    • Unable to Pay for Housing in the Last Year: Not on file   • Number of Places Lived in the Last Year: Not on file   • Unstable Housing in the Last Year: Not on file       SURGICAL HISTORY  Past Surgical History:   Procedure Laterality Date   • OTHER ORTHOPEDIC SURGERY Left 2002    knee       CURRENT MEDICATIONS  No current facility-administered medications on file prior to encounter.     Current Outpatient Medications on File Prior to Encounter   Medication Sig Dispense Refill   • Omeprazole Magnesium (PRILOSEC) 10 MG Pack Take 1 Tablet by mouth every day.     • Multiple Vitamin (MULTIVITAMIN ADULT PO) Take 1 Tablet by mouth every day.     • beclomethasone HFA (QVAR REDIHALER) 80 MCG/ACT inhaler Inhale 1 Puff 2 times a day. 10.6 g 0  "      ALLERGIES  Allergies   Allergen Reactions   • Azithromycin        PHYSICAL EXAM  VITAL SIGNS: /85   Pulse 60   Temp 36.1 °C (97 °F) (Temporal)   Resp 16   Ht 1.854 m (6' 1\")   Wt 87.4 kg (192 lb 10.9 oz)   SpO2 98%   BMI 25.42 kg/m²   Constitutional:  Well nourished, No acute distress.   HENT: Bilateral tympanic membranes are normal.  No external ear pain, no evidence of otitis externa.  Eyes:  Conjunctiva normal, No discharge.    Cardiovascular: The heart is regular rhythm, normal rate  Pulmonary: Lung sounds are clear  Skin: No cyanosis.   Musculoskeletal: Neck shows normal range of motion  Neurologic: speech is clear, no ataxia   Psychiatric:  Mood normal.  Cooperative      COURSE & MEDICAL DECISION MAKING  Pertinent Labs & Imaging studies reviewed. (See chart for details)  Patient with left ear pain of unknown etiology.  Possible effusion although no erythematous change to the left tympanic membrane.  He is already taking Omnicef, he is encouraged to finish his prescription.  He is given a dose of prednisone in the emergency department for help with inflammation.  I have suggested over-the-counter decongestants, he states he will take Zyrtec.  Regarding his COVID-19 infection, normal pulse oximetry, no respiratory distress.  I have recommended he follow-up with an ear nose throat physician next week if not better regarding his left ear pain, he is advised we do not have the specialty on-call at this hospital and he should contact his primary doctor for referral.    FINAL IMPRESSION     1. Left ear pain     2. COVID-19                  Electronically signed by: Lenin Plasencia M.D., 12/3/2021 8:57 AM    "

## 2021-12-05 ENCOUNTER — HOSPITAL ENCOUNTER (EMERGENCY)
Facility: MEDICAL CENTER | Age: 41
End: 2021-12-05
Attending: EMERGENCY MEDICINE
Payer: COMMERCIAL

## 2021-12-05 VITALS
HEIGHT: 73 IN | TEMPERATURE: 98.8 F | BODY MASS INDEX: 25.54 KG/M2 | HEART RATE: 60 BPM | RESPIRATION RATE: 18 BRPM | SYSTOLIC BLOOD PRESSURE: 140 MMHG | DIASTOLIC BLOOD PRESSURE: 82 MMHG | OXYGEN SATURATION: 97 % | WEIGHT: 192.68 LBS

## 2021-12-05 DIAGNOSIS — H65.192 ACUTE EFFUSION OF LEFT EAR: ICD-10-CM

## 2021-12-05 DIAGNOSIS — H93.12 TINNITUS OF LEFT EAR: ICD-10-CM

## 2021-12-05 PROCEDURE — 99283 EMERGENCY DEPT VISIT LOW MDM: CPT

## 2021-12-05 RX ORDER — MOMETASONE FUROATE 50 UG/1
2 SPRAY, METERED NASAL DAILY
Qty: 0.1 G | Refills: 0 | Status: SHIPPED | OUTPATIENT
Start: 2021-12-05 | End: 2021-12-05 | Stop reason: SDUPTHER

## 2021-12-05 RX ORDER — OXYMETAZOLINE HYDROCHLORIDE 0.05 G/100ML
2 SPRAY NASAL 3 TIMES DAILY
Qty: 2.4 ML | Refills: 0 | Status: SHIPPED | OUTPATIENT
Start: 2021-12-05 | End: 2021-12-05 | Stop reason: SDUPTHER

## 2021-12-05 RX ORDER — ALPRAZOLAM 0.5 MG/1
0.5 TABLET ORAL 3 TIMES DAILY PRN
Qty: 12 TABLET | Refills: 0 | Status: SHIPPED | OUTPATIENT
Start: 2021-12-05 | End: 2021-12-05 | Stop reason: SDUPTHER

## 2021-12-05 RX ORDER — OXYMETAZOLINE HYDROCHLORIDE 0.05 G/100ML
2 SPRAY NASAL 3 TIMES DAILY
Qty: 2.4 ML | Refills: 0 | Status: SHIPPED | OUTPATIENT
Start: 2021-12-05 | End: 2021-12-09

## 2021-12-05 RX ORDER — ALPRAZOLAM 0.5 MG/1
0.5 TABLET ORAL 3 TIMES DAILY PRN
Qty: 12 TABLET | Refills: 0 | Status: SHIPPED | OUTPATIENT
Start: 2021-12-05 | End: 2021-12-08

## 2021-12-05 RX ORDER — MOMETASONE FUROATE 50 UG/1
2 SPRAY, METERED NASAL DAILY
Qty: 0.1 G | Refills: 0 | Status: SHIPPED | OUTPATIENT
Start: 2021-12-05 | End: 2021-12-19

## 2021-12-05 NOTE — ED PROVIDER NOTES
ED Provider Note    CHIEF COMPLAINT    Left ear ringing    HPI  Diego Abarca is a 41 y.o. male who presents here primarily for left ear ringing and requesting ear nose throat consultation.  Patient has had had ear ringing now for about a week and a half.  It is the left ear only.  It was is constant he says might be getting slightly worse.  The only thing makes it better is when he takes some Xanax.  He states is unable to do medical marijuana as he had a DUI at one point and therefore cannot do meth marijuana at this time.  It is worse when he covers that year.  There is also associated nasal congestion and when he blows his nose some blood he states that it was not significant proved with Omnicef which she took nor 1 dose of prednisone.    I did review the note the patient had been here diagnosed with tinnitus of the left ear.  This was at [facility].  He was told to follow-up with his doctor to get ear nose throat consultation    The patient states that about Tuesday Wednesday while he was in [place] at 7000 feet he noticed congestion sore throat first and left ear pain he was driving all up and down to [place] different altitudes with significant worsening of his pain he said he would hit his ear few times because it was so bad.  I knew twice that he had blood in his ear and then subsequently came to the ER at [facility].  Was seen and treated see at [place].  General: No fever chills  Is ear nose throat: See above      REVIEW OF SYSTEMS  Pulmonary: No difficulty breathing  General: No fever chills  Ear nose throat: See above    PAST MEDICAL HISTORY  Past Medical History:   Diagnosis Date   • Alpha 1-antitrypsin PiMS phenotype    • Asthma    • Chickenpox    • Yoruba measles    • Mumps        FAMILY HISTORY  Family History   Problem Relation Age of Onset   • No Known Problems Mother    • Diabetes Maternal Grandfather    • Heart Disease Maternal Grandfather    • Stroke Other    • Cancer Child          leukemia type B   • Drug abuse Neg Hx    • Alcohol abuse Neg Hx        SOCIAL HISTORY  Social History     Socioeconomic History   • Marital status:      Spouse name: Not on file   • Number of children: Not on file   • Years of education: Not on file   • Highest education level: Not on file   Occupational History   • Not on file   Tobacco Use   • Smoking status: Never Smoker   • Smokeless tobacco: Never Used   Vaping Use   • Vaping Use: Never used   Substance and Sexual Activity   • Alcohol use: Yes     Alcohol/week: 8.4 oz     Types: 14 Cans of beer per week   • Drug use: Yes     Types: Inhaled, Marijuana     Comment: Marijuana   • Sexual activity: Yes     Partners: Female     Comment: wife   Other Topics Concern   • Not on file   Social History Narrative   • Not on file     Social Determinants of Health     Financial Resource Strain:    • Difficulty of Paying Living Expenses: Not on file   Food Insecurity:    • Worried About Running Out of Food in the Last Year: Not on file   • Ran Out of Food in the Last Year: Not on file   Transportation Needs:    • Lack of Transportation (Medical): Not on file   • Lack of Transportation (Non-Medical): Not on file   Physical Activity:    • Days of Exercise per Week: Not on file   • Minutes of Exercise per Session: Not on file   Stress:    • Feeling of Stress : Not on file   Social Connections:    • Frequency of Communication with Friends and Family: Not on file   • Frequency of Social Gatherings with Friends and Family: Not on file   • Attends Adventism Services: Not on file   • Active Member of Clubs or Organizations: Not on file   • Attends Club or Organization Meetings: Not on file   • Marital Status: Not on file   Intimate Partner Violence:    • Fear of Current or Ex-Partner: Not on file   • Emotionally Abused: Not on file   • Physically Abused: Not on file   • Sexually Abused: Not on file   Housing Stability:    • Unable to Pay for Housing in the Last Year: Not on  "file   • Number of Places Lived in the Last Year: Not on file   • Unstable Housing in the Last Year: Not on file       SURGICAL HISTORY  Past Surgical History:   Procedure Laterality Date   • OTHER ORTHOPEDIC SURGERY Left 2002    knee       CURRENT MEDICATIONS  Home Medications    **Home medications have not yet been reviewed for this encounter**         ALLERGIES  Allergies   Allergen Reactions   • Azithromycin        PHYSICAL EXAM  VITAL SIGNS: /65   Pulse 60   Temp 36.5 °C (97.7 °F) (Temporal)   Resp 14   Ht 1.854 m (6' 1\")   Wt 87.4 kg (192 lb 10.9 oz)   SpO2 97%   BMI 25.42 kg/m²    Constitutional: Well developed, Well nourished, no acute distress,   HENT: Normocephalic, Atraumatic, Oropharynx moist, No oral exudates, Nose normal.  The left ear shows 2 small punctate red dots measuring that are in the proximal 3-4 o'clock if you look vertically in the ear.  That the left TM is a little more opaque than the right NOC fluid level of both nasal turbinates are very erythematous.  There is no sinus tenderness there is minimal postnasal cobblestoning.  Eyes: PERRLA, EOMI, Conjunctiva normal, No discharge.   Musculoskeletal: Neck normal range of motion, No tenderness, Supple,    Psychiatric: Slightly anxious      RADIOLOGY/PROCEDURES  No testing done    COURSE & MEDICAL DECISION MAKING  Pertinent Labs & Imaging studies reviewed. (See chart for details)  There is a gentleman with now tinnitus for at least's 8 or 9days which is constant not getting better only relieved slightly with Xanax he does have a day off tomorrow and it sounds like he is here to try to get a ear nose throat consultation and second opinion    Is itching in the left TM it is more opaque and around 3 or 4:00 and will feel like there is a slight layer opacity is different than the other not sure if there was trauma to this TM or perhaps still effusion.    3:36 PM  I did speak to Dr. Zia Butler the ear nose throat doctor on-call.  He " informed me that if there is a fusion this would be sterile the patient is already on antibiotics.  In addition that it would take up to 4 to 6 weeks for tenderness to resolve even that would be as long as symptoms would take from otitis media and this is all normal.  I I called him primarily because patient had been to the ER several x3 times already.  Patient has anxiety.  In addition the ear nose throat doctor recommended that an decongestions or nasal steroids would not help but at this point it might help this patient as it would help his anxiety which I agree completely    He would like the patient follow-up with him in 1 months    I spoke to the patient and in addition the patient did have a prescription monitoring search was deemed low risk.  Patient was given a short course of Xanax to help him through the tenderness.  We will go and put him on Nasonex nasal steroid for the next 7 to 12 days.  Nasal decongestant for 4 days then stop and understand rebound hyperemia.  I have asked him to follow-up with with Dr. Butler in 4 weeks.  Office return to the ER if his increasing numbness tingling.  Reassurance was given that the ear nose throat doctor feel that this will resolve in the next month or so.  Obviously if the symptoms worsen please to come back to the ER.    FINAL IMPRESSION  1.  Tinnitus  2.  Anxiety  3.      Electronically signed by: Tanmay Betancourt M.D., 12/5/2021 3:05 PM

## 2021-12-05 NOTE — ED NOTES
Pt to room 58 c/o of ringing in ears since COVID+ x2 weeks ago in L ear, worsening. Pt seen at [facility] x2 and UC in [place] for c/c. Pt given cefdinir w/ visits w/ little help. -drainage, only high pitch ringing, chart up for ERP

## 2021-12-05 NOTE — ED TRIAGE NOTES
"Pt to triage, c/o ringing in ears, pt states he tested positive for Covid last Tuesday, he has been seen in [place] and at [facility] ER for same complaint; ringing in ears, was \" told to come here to be seen by an ear specialist\" .   "

## 2021-12-05 NOTE — ED NOTES
Pt given d/c instructions for home. F/u w/ otolaryngology and x3 RX sent to pharmacy. Denies any questions/concerns. Pt ambulated out of dept w/ steady gait A&O x4

## 2021-12-06 ENCOUNTER — APPOINTMENT (OUTPATIENT)
Dept: MEDICAL GROUP | Facility: PHYSICIAN GROUP | Age: 41
End: 2021-12-06
Payer: COMMERCIAL

## 2021-12-06 ENCOUNTER — TELEMEDICINE (OUTPATIENT)
Dept: MEDICAL GROUP | Facility: PHYSICIAN GROUP | Age: 41
End: 2021-12-06
Payer: COMMERCIAL

## 2021-12-06 VITALS — WEIGHT: 187 LBS | BODY MASS INDEX: 24.78 KG/M2 | HEIGHT: 73 IN

## 2021-12-06 DIAGNOSIS — F32.A DEPRESSION, UNSPECIFIED DEPRESSION TYPE: ICD-10-CM

## 2021-12-06 DIAGNOSIS — F41.9 ANXIETY: ICD-10-CM

## 2021-12-06 DIAGNOSIS — U07.1 COVID-19 VIRUS INFECTION: ICD-10-CM

## 2021-12-06 PROCEDURE — 99213 OFFICE O/P EST LOW 20 MIN: CPT | Mod: 95,CR | Performed by: FAMILY MEDICINE

## 2021-12-06 RX ORDER — HYDROXYZINE HYDROCHLORIDE 25 MG/1
50 TABLET, FILM COATED ORAL 3 TIMES DAILY PRN
Qty: 30 TABLET | Refills: 0 | Status: SHIPPED | OUTPATIENT
Start: 2021-12-06 | End: 2021-12-13 | Stop reason: SDUPTHER

## 2021-12-06 NOTE — DISCHARGE PLANNING
Received incoming call from Carina, pt's spouse, requesting pharmacy change due to [facility] on [address] closed on Sunday. LILO ERWIN sent Dr. Betancourt who [facility] to [facility] [address] [facility]. Updated pt via phone.

## 2021-12-07 NOTE — PROGRESS NOTES
Virtual Visit: Established Patient   This visit was conducted via Zoom using secure and encrypted videoconferencing technology.   The patient was in a private location in the state of [place].    The patient's identity was confirmed and verbal consent was obtained for this virtual visit.    Subjective:   CC:   Chief Complaint   Patient presents with   • Ringing in Ear     constant   • Anxiety     Diego Abarca is a 41 y.o. male presenting for evaluation and management of:    COVID-19 Infection  Contracted COVID around thanksgiving - sore throat, feeling ill, L hearing loss, L tinnitus. Diagnosed with ear infection in [place], Rx'd cefdinir. Tested positive for COVID-19 on 11/29/2021.     Anxiety  Got a DUI, has to stop smoking marijuana. Currently using xanax but not helping. For 5 years he was on medical marijuana for anxiety. He is working on getting a medical marijuana card again. He reports that he has tried medications in the past but the marijuana works much better. Also notes some depression since stopping the marijuana. Tried librium, sertraline.     ROS   Gen: + fevers/chills - recent COVID  CV: no chest pain  Pulm: no shortness of breath    Current medicines (including changes today)  Current Outpatient Medications   Medication Sig Dispense Refill   • hydrOXYzine HCl (ATARAX) 25 MG Tab Take 2 Tablets by mouth 3 times a day as needed for Anxiety. 30 Tablet 0   • ALPRAZolam (XANAX) 0.5 MG Tab Take 1 Tablet by mouth 3 times a day as needed (tinnitus) for up to 3 days. 12 Tablet 0   • mometasone (NASONEX) 50 MCG/ACT nasal spray Administer 2 Sprays into affected nostril(S) every day for 14 days. 0.1 g 0   • oxymetazoline (AFRIN) 0.05 % Solution Administer 2 Sprays into affected nostril(S) 3 times a day for 4 days. Then stop. Understand the congestion may come back but it will be better than before 2.4 mL 0   • Omeprazole Magnesium (PRILOSEC) 10 MG Pack Take 1 Tablet by mouth every day.     •  "Multiple Vitamin (MULTIVITAMIN ADULT PO) Take 1 Tablet by mouth every day.       No current facility-administered medications for this visit.       Patient Active Problem List    Diagnosis Date Noted   • Gastroesophageal reflux disease with esophagitis 11/10/2021   • Costochondral chest pain 11/10/2021   • Skin lesion 04/19/2021   • Skin tag 10/15/2020   • Pain of finger of left hand 09/08/2020   • Thoracic back pain 06/12/2020   • Dyshidrotic eczema 06/12/2020   • Gout 05/18/2020   • Cyst of skin 05/18/2020   • Asthma 05/18/2020   • Right elbow pain 05/18/2020   • Abdominal pain 01/04/2017        Objective:   Ht 1.854 m (6' 1\") Comment: pt reported  Wt 84.8 kg (187 lb) Comment: pt reported  BMI 24.67 kg/m²  RR: 12    Physical Exam:  Constitutional: Alert, no distress, well-groomed.  Skin: No rashes in visible areas.  Eye: Round. Conjunctiva clear, lids normal. No icterus.   ENMT: Lips pink without lesions, good dentition, moist mucous membranes. Phonation normal.  Neck: No masses, no thyromegaly. Moves freely without pain.  Respiratory: Unlabored respiratory effort, no cough or audible wheeze  Psych: Alert and oriented x3, normal affect and mood.     Assessment and Plan:   The following treatment plan was discussed:     1. COVID-19 virus infection  Acute.  Tested positive on 11/29/2021.  Symptoms are improving except for lingering tinnitus that is driving him absolutely crazy.  He does have an appoint with ENT in a few weeks to follow-up on it.    2. Anxiety  Chronic, uncontrolled.  Reports a longstanding history of anxiety and for many years he has medicated with marijuana.  He used to have a medical marijuana card but lost it a few years ago and recently had a DUI is now struggling with all the legal complications of that.  He has been trying Xanax but not finding it helpful and it might in fact be making things worse.  He is not interested in starting a daily medication as he is trying to get a new medical " marijuana card.  We will try hydroxyzine to see if it works better than the Xanax while he works on getting the new card.  He knows to come back and see me if something happens with the legal ramifications and he can no longer smoke marijuana to talk about what options I have for him.    3. Depression, unspecified depression type  Chronic, uncontrolled.  He states since stopping the marijuana his mood has tanked significantly.  He is tried medications in the past including Zoloft but he could not tolerate the sexual side effects.  As discussed above, he is not interested in trying any medications from me today as he very much would like to get back on medical marijuana as he is found that the most helpful.      Follow-up: Return if symptoms worsen or fail to improve.

## 2021-12-10 NOTE — DISCHARGE PLANNING
[facility] [address] pharmacist called because he needs the prescription for the Aprazola clarified. Email sent to Mary VIGIL.

## 2021-12-23 RX ORDER — TRAZODONE HYDROCHLORIDE 50 MG/1
TABLET ORAL
Qty: 180 TABLET | Refills: 0 | Status: SHIPPED | OUTPATIENT
Start: 2021-12-23 | End: 2022-01-07

## 2022-01-07 ENCOUNTER — OFFICE VISIT (OUTPATIENT)
Dept: MEDICAL GROUP | Facility: PHYSICIAN GROUP | Age: 42
End: 2022-01-07
Payer: COMMERCIAL

## 2022-01-07 VITALS
RESPIRATION RATE: 16 BRPM | WEIGHT: 201.6 LBS | OXYGEN SATURATION: 98 % | DIASTOLIC BLOOD PRESSURE: 74 MMHG | HEART RATE: 68 BPM | BODY MASS INDEX: 26.72 KG/M2 | SYSTOLIC BLOOD PRESSURE: 118 MMHG | TEMPERATURE: 98 F | HEIGHT: 73 IN

## 2022-01-07 DIAGNOSIS — L98.9 SKIN LESION: ICD-10-CM

## 2022-01-07 PROCEDURE — 99212 OFFICE O/P EST SF 10 MIN: CPT | Performed by: FAMILY MEDICINE

## 2022-01-07 RX ORDER — MOMETASONE FUROATE 50 UG/1
SPRAY, METERED NASAL
COMMUNITY
Start: 2021-12-28 | End: 2022-01-07

## 2022-01-07 RX ORDER — ALPRAZOLAM 0.5 MG/1
TABLET ORAL
COMMUNITY
Start: 2021-12-10 | End: 2022-02-03 | Stop reason: SDUPTHER

## 2022-01-07 ASSESSMENT — ENCOUNTER SYMPTOMS
SHORTNESS OF BREATH: 0
FEVER: 0
CHILLS: 0

## 2022-01-07 ASSESSMENT — PATIENT HEALTH QUESTIONNAIRE - PHQ9: CLINICAL INTERPRETATION OF PHQ2 SCORE: 0

## 2022-01-07 NOTE — ASSESSMENT & PLAN NOTE
Acute.  Red lump on right neck that appears to either be due to an ingrown hair or possible clogged sebaceous gland.  We discussed conservative management including hot compresses and that will likely resolve on its own shortly.  Return precautions provided.

## 2022-01-07 NOTE — PROGRESS NOTES
"Subjective:     CC: skin lesion    HPI:   Diego presents today with    Problem   Skin Lesion    Onset: yesterday  Quality: red lump on right neck  Associated: +pain, +hot feeling, unsure fevers/chills    It was bigger yesterday, better today.         Health Maintenance: Completed    ROS:  Review of Systems   Constitutional: Negative for chills and fever.   Respiratory: Negative for shortness of breath.    Cardiovascular: Negative for chest pain.       Objective:     Exam:  /74 (BP Location: Right arm, Patient Position: Sitting, BP Cuff Size: Adult)   Pulse 68   Temp 36.7 °C (98 °F) (Temporal)   Resp 16   Ht 1.854 m (6' 1\")   Wt 91.4 kg (201 lb 9.6 oz)   SpO2 98%   BMI 26.60 kg/m²  Body mass index is 26.6 kg/m².    Physical Exam  Constitutional:       Appearance: Normal appearance.   Neck:     Cardiovascular:      Rate and Rhythm: Normal rate and regular rhythm.      Heart sounds: Normal heart sounds.   Pulmonary:      Effort: Pulmonary effort is normal.      Breath sounds: Normal breath sounds.   Musculoskeletal:      Cervical back: Normal range of motion and neck supple.   Neurological:      Mental Status: He is alert.           Assessment & Plan:     41 y.o. male with the following -     Problem List Items Addressed This Visit     Skin lesion     Acute.  Red lump on right neck that appears to either be due to an ingrown hair or possible clogged sebaceous gland.  We discussed conservative management including hot compresses and that will likely resolve on its own shortly.  Return precautions provided.             Return if symptoms worsen or fail to improve.    Please note that this dictation was created using voice recognition software. I have made every reasonable attempt to correct obvious errors, but I expect that there are errors of grammar and possibly content that I did not discover before finalizing the note.      "

## 2022-02-01 ENCOUNTER — OFFICE VISIT (OUTPATIENT)
Dept: MEDICAL GROUP | Facility: PHYSICIAN GROUP | Age: 42
End: 2022-02-01
Payer: COMMERCIAL

## 2022-02-01 VITALS
TEMPERATURE: 98 F | DIASTOLIC BLOOD PRESSURE: 62 MMHG | RESPIRATION RATE: 20 BRPM | WEIGHT: 202.6 LBS | BODY MASS INDEX: 26.85 KG/M2 | HEIGHT: 73 IN | HEART RATE: 61 BPM | OXYGEN SATURATION: 97 % | SYSTOLIC BLOOD PRESSURE: 108 MMHG

## 2022-02-01 DIAGNOSIS — H92.01 RIGHT EAR PAIN: ICD-10-CM

## 2022-02-01 PROCEDURE — 99213 OFFICE O/P EST LOW 20 MIN: CPT | Performed by: FAMILY MEDICINE

## 2022-02-01 ASSESSMENT — ENCOUNTER SYMPTOMS
FEVER: 0
SORE THROAT: 0
PALPITATIONS: 0
BLURRED VISION: 0
CHILLS: 0
DOUBLE VISION: 0
COUGH: 0
SHORTNESS OF BREATH: 0
NAUSEA: 0
DIZZINESS: 0
MYALGIAS: 0
VOMITING: 0
HEADACHES: 0

## 2022-02-01 NOTE — ASSESSMENT & PLAN NOTE
Chronic x3 wks  Went to ENT,  Pain on R started the day after  TM wnl bilat  Sending abx with steroid component  Pt will use naids and ear donut pillow to keep pressure off ear  F/u with ENT if not resolved

## 2022-02-01 NOTE — PROGRESS NOTES
"Subjective:     CC: R ear pain x3 wks    HPI:   Diego presents today with   Went to ENT, 2/6  Pain started 2/7  No instrumentation on 2/6  No fevers  No congestion  No injury  No dc  Ringing,  Was told he has high freq hearing decrease at ENT  Pain when he presses on it.    Non inflammed    Problem   Right Ear Pain       Current Outpatient Medications Ordered in Epic   Medication Sig Dispense Refill   • neomycin sulf/polymyx B sulf/HC soln (CORTISPORIN HC SOL) 3.5-97923-0 Solution Administer 3 Drops into the right ear 4 times a day. Administer drops to both ears. 10 mL 0   • ALPRAZolam (XANAX) 0.5 MG Tab      • Omeprazole Magnesium (PRILOSEC) 10 MG Pack Take 1 Tablet by mouth every day.     • hydrOXYzine HCl (ATARAX) 25 MG Tab Take 2 Tablets by mouth 3 times a day as needed for Anxiety. (Patient not taking: Reported on 2/1/2022) 120 Tablet 0   • Multiple Vitamin (MULTIVITAMIN ADULT PO) Take 1 Tablet by mouth every day. (Patient not taking: Reported on 2/1/2022)       No current [facility]-ordered facility-administered medications on file.     ROS:  Review of Systems   Constitutional: Negative for chills and fever.   HENT: Positive for ear pain and tinnitus. Negative for ear discharge and sore throat.    Eyes: Negative for blurred vision and double vision.   Respiratory: Negative for cough and shortness of breath.    Cardiovascular: Negative for chest pain and palpitations.   Gastrointestinal: Negative for nausea and vomiting.   Genitourinary: Negative for dysuria and hematuria.   Musculoskeletal: Negative for myalgias.   Neurological: Negative for dizziness and headaches.       Objective:     Exam:  /62 (BP Location: Right arm, Patient Position: Sitting, BP Cuff Size: Adult)   Pulse 61   Temp 36.7 °C (98 °F) (Temporal)   Resp 20   Ht 1.854 m (6' 1\")   Wt 91.9 kg (202 lb 9.6 oz)   SpO2 97%   BMI 26.73 kg/m²  Body mass index is 26.73 kg/m².    Physical Exam  Constitutional:       General: He is not in acute " distress.     Appearance: Normal appearance. He is not ill-appearing, toxic-appearing or diaphoretic.   HENT:      Head: Normocephalic and atraumatic.      Right Ear: Tympanic membrane, ear canal and external ear normal. There is no impacted cerumen.      Left Ear: Tympanic membrane and external ear normal. There is no impacted cerumen.      Nose: Nose normal. No congestion.   Eyes:      General: No scleral icterus.        Right eye: No discharge.         Left eye: No discharge.      Extraocular Movements: Extraocular movements intact.      Conjunctiva/sclera: Conjunctivae normal.      Pupils: Pupils are equal, round, and reactive to light.   Neurological:      Mental Status: He is alert.      Coordination: Coordination normal.      Gait: Gait normal.         Assessment & Plan:     41 y.o. male with the following -     Problem List Items Addressed This Visit     Right ear pain     Chronic x3 wks  Went to ENT,  Pain on R started the day after  TM wnl bilat  Sending abx with steroid component  Pt will use naids and ear donut pillow to keep pressure off ear  F/u with ENT if not resolved         Relevant Medications    neomycin sulf/polymyx B sulf/HC soln (CORTISPORIN HC SOL) 3.5-56849-1 Solution        Return in about 2 weeks (around 2/15/2022), or if symptoms worsen or fail to improve.    Please note that this dictation was created using voice recognition software. I have made every reasonable attempt to correct obvious errors, but I expect that there are errors of grammar and possibly content that I did not discover before finalizing the note.

## 2022-02-03 ENCOUNTER — OFFICE VISIT (OUTPATIENT)
Dept: MEDICAL GROUP | Facility: PHYSICIAN GROUP | Age: 42
End: 2022-02-03
Payer: COMMERCIAL

## 2022-02-03 VITALS
HEIGHT: 73 IN | HEART RATE: 62 BPM | OXYGEN SATURATION: 97 % | TEMPERATURE: 97.7 F | RESPIRATION RATE: 16 BRPM | BODY MASS INDEX: 26.8 KG/M2 | WEIGHT: 202.2 LBS | DIASTOLIC BLOOD PRESSURE: 68 MMHG | SYSTOLIC BLOOD PRESSURE: 114 MMHG

## 2022-02-03 DIAGNOSIS — F41.9 ANXIETY: Primary | ICD-10-CM

## 2022-02-03 DIAGNOSIS — G44.89 OTHER HEADACHE SYNDROME: ICD-10-CM

## 2022-02-03 PROBLEM — R51.9 HEADACHE: Status: ACTIVE | Noted: 2022-02-03

## 2022-02-03 PROCEDURE — 99214 OFFICE O/P EST MOD 30 MIN: CPT | Performed by: FAMILY MEDICINE

## 2022-02-03 RX ORDER — ALPRAZOLAM 0.5 MG/1
0.5 TABLET ORAL
Qty: 20 TABLET | Refills: 0 | Status: SHIPPED | OUTPATIENT
Start: 2022-02-03 | End: 2022-03-05

## 2022-02-03 RX ORDER — FLUTICASONE PROPIONATE 50 MCG
1 SPRAY, SUSPENSION (ML) NASAL DAILY
Qty: 16 G | Refills: 0 | Status: SHIPPED | OUTPATIENT
Start: 2022-02-03 | End: 2022-02-18

## 2022-02-03 ASSESSMENT — ENCOUNTER SYMPTOMS
SHORTNESS OF BREATH: 0
CHILLS: 0
FEVER: 0

## 2022-02-03 NOTE — ASSESSMENT & PLAN NOTE
Chronic, uncontrolled.  He has had anxiety for many years and he would medicate with marijuana.  He unfortunately recently had a DUI due to the marijuana and can no longer use it.  He reports he is going avoid the marijuana.  We had initially given Xanax to help manage anxiety attacks but it made things worse.  Hydroxyzine worked better.  However, today he reports the Xanax is working better and he would like a refill of the Xanax.  We had a long discussion that as needed treatment for anxiety is not always the best method and I would recommend a daily anxiety medication considering the anxiety has lasted for years.  He has no interest in taking a daily medication.  We did discuss extensively the risks of using Xanax regularly to manage anxiety.  He still would like a refill.  I did provide 20 tablets.  I informed him clearly that if he ever needs a refill that we will get him to psychiatry to manage his anxiety.  -Xanax 0.5 mg daily as needed anxiety  -Or he can try hydroxyzine as needed for anxiety which is already prescribed

## 2022-02-03 NOTE — ASSESSMENT & PLAN NOTE
Acute.  For last 2 months he has been getting episodes of headaches.  They are frontal headaches with pressure that worsened when he bent forward.  Photophobia and phonophobia is present.  Differential includes tension versus migraine versus sinus headache.  We will treat sinus headache first.  -Flonase daily   -If this does not work, consider sumatriptan as needed for headaches

## 2022-02-03 NOTE — PROGRESS NOTES
"Subjective:     CC: headache, anxiety    HPI:   Diego presents today with    Problem   Headache    Onset: 2 months  Location: frontal   Quality: pressure  Severity: 7/10  Duration: 8 hours  Associated symptoms: no nausea/vomiting, +photophobia, +phonophobia, +bending forward worsens  Aura: no  Frequency: average once/week  Prophylaxis: none  Abortive meds: advil - sometimes helps, wife's medication (doesn't know name)     Anxiety    Chronic. He is not getting a medical marijuana card, and won't be using marijuana. He wants a refill of xanax and finds 1/2 will go to sleep. He is not going to take a medication daily. He doesn't feel he has symptoms every day.          Health Maintenance: Completed    ROS:  Review of Systems   Constitutional: Negative for chills and fever.   Respiratory: Negative for shortness of breath.    Cardiovascular: Negative for chest pain.       Objective:     Exam:  /68 (BP Location: Left arm, Patient Position: Sitting, BP Cuff Size: Adult)   Pulse 62   Temp 36.5 °C (97.7 °F) (Temporal)   Resp 16   Ht 1.854 m (6' 1\")   Wt 91.7 kg (202 lb 3.2 oz)   SpO2 97%   BMI 26.68 kg/m²  Body mass index is 26.68 kg/m².    Physical Exam  Constitutional:       Appearance: Normal appearance.   Cardiovascular:      Rate and Rhythm: Normal rate and regular rhythm.      Heart sounds: Normal heart sounds.   Pulmonary:      Effort: Pulmonary effort is normal.      Breath sounds: Normal breath sounds.   Musculoskeletal:      Cervical back: Normal range of motion and neck supple.   Neurological:      Mental Status: He is alert.       Assessment & Plan:     41 y.o. male with the following -     Problem List Items Addressed This Visit     Headache     Acute.  For last 2 months he has been getting episodes of headaches.  They are frontal headaches with pressure that worsened when he bent forward.  Photophobia and phonophobia is present.  Differential includes tension versus migraine versus sinus " headache.  We will treat sinus headache first.  -Flonase daily   -If this does not work, consider sumatriptan as needed for headaches         Anxiety     Chronic, uncontrolled.  He has had anxiety for many years and he would medicate with marijuana.  He unfortunately recently had a DUI due to the marijuana and can no longer use it.  He reports he is going avoid the marijuana.  We had initially given Xanax to help manage anxiety attacks but it made things worse.  Hydroxyzine worked better.  However, today he reports the Xanax is working better and he would like a refill of the Xanax.  We had a long discussion that as needed treatment for anxiety is not always the best method and I would recommend a daily anxiety medication considering the anxiety has lasted for years.  He has no interest in taking a daily medication.  We did discuss extensively the risks of using Xanax regularly to manage anxiety.  He still would like a refill.  I did provide 20 tablets.  I informed him clearly that if he ever needs a refill that we will get him to psychiatry to manage his anxiety.  -Xanax 0.5 mg daily as needed anxiety  -Or he can try hydroxyzine as needed for anxiety which is already prescribed         Relevant Medications    ALPRAZolam (XANAX) 0.5 MG Tab        Return if symptoms worsen or fail to improve.    Please note that this dictation was created using voice recognition software. I have made every reasonable attempt to correct obvious errors, but I expect that there are errors of grammar and possibly content that I did not discover before finalizing the note.

## 2022-02-18 ENCOUNTER — OFFICE VISIT (OUTPATIENT)
Dept: URGENT CARE | Facility: CLINIC | Age: 42
End: 2022-02-18
Payer: COMMERCIAL

## 2022-02-18 VITALS
OXYGEN SATURATION: 98 % | WEIGHT: 196 LBS | BODY MASS INDEX: 25.98 KG/M2 | RESPIRATION RATE: 16 BRPM | HEIGHT: 73 IN | TEMPERATURE: 98.5 F | SYSTOLIC BLOOD PRESSURE: 120 MMHG | DIASTOLIC BLOOD PRESSURE: 80 MMHG | HEART RATE: 60 BPM

## 2022-02-18 DIAGNOSIS — L73.9 NASAL FOLLICULITIS: ICD-10-CM

## 2022-02-18 PROCEDURE — 99213 OFFICE O/P EST LOW 20 MIN: CPT | Performed by: EMERGENCY MEDICINE

## 2022-02-18 ASSESSMENT — ENCOUNTER SYMPTOMS: FEVER: 0

## 2022-02-18 NOTE — PROGRESS NOTES
"Subjective     Checo Abarca is a 41 y.o. male who presents with Bump (X last night, Bump on Rt. Side of nose.)            Rash  This is a new problem. The current episode started yesterday. The rash is characterized by redness, swelling and pain. Pertinent negatives include no congestion or fever. Past treatments include nothing.   No trauma.  Notes tender swollen right septal intranasal site.  Notes recent episodes of skin lesions post Covid infection.    Review of Systems   Constitutional: Negative for fever.   HENT: Positive for nosebleeds. Negative for congestion.    Skin: Positive for rash. Negative for itching.              Objective     /80 (BP Location: Left arm, Patient Position: Sitting, BP Cuff Size: Large adult)   Pulse 60   Temp 36.9 °C (98.5 °F) (Temporal)   Resp 16   Ht 1.854 m (6' 1\")   Wt 88.9 kg (196 lb)   SpO2 98%   BMI 25.86 kg/m²      Physical Exam  Constitutional:       General: He is not in acute distress.     Appearance: Normal appearance.   HENT:      Head: Normocephalic and atraumatic.      Nose: Nasal tenderness present. No nasal deformity, signs of injury, mucosal edema, congestion or rhinorrhea.     Skin:     General: Skin is warm and dry.   Neurological:      Cranial Nerves: No facial asymmetry.   Psychiatric:         Behavior: Behavior is cooperative.                             Assessment & Plan        1. Nasal folliculitis  - mupirocin (BACTROBAN) 2 % nasal ointment; One half tube in each nostril twice a day for 5 days  Dispense: 10 g; Refill: 0                "

## 2022-02-27 ENCOUNTER — OFFICE VISIT (OUTPATIENT)
Dept: URGENT CARE | Facility: CLINIC | Age: 42
End: 2022-02-27
Payer: COMMERCIAL

## 2022-02-27 VITALS
DIASTOLIC BLOOD PRESSURE: 60 MMHG | WEIGHT: 200 LBS | HEART RATE: 52 BPM | HEIGHT: 73 IN | TEMPERATURE: 97.2 F | SYSTOLIC BLOOD PRESSURE: 100 MMHG | BODY MASS INDEX: 26.51 KG/M2 | OXYGEN SATURATION: 98 % | RESPIRATION RATE: 16 BRPM

## 2022-02-27 DIAGNOSIS — L23.89 ALLERGIC CONTACT DERMATITIS DUE TO OTHER AGENTS: ICD-10-CM

## 2022-02-27 PROCEDURE — 99213 OFFICE O/P EST LOW 20 MIN: CPT | Performed by: PHYSICIAN ASSISTANT

## 2022-02-27 ASSESSMENT — ENCOUNTER SYMPTOMS
CONSTIPATION: 0
EYE PAIN: 0
SORE THROAT: 0
MYALGIAS: 0
ABDOMINAL PAIN: 0
NAUSEA: 0
VOMITING: 0
HEADACHES: 0
COUGH: 0
CHILLS: 0
SHORTNESS OF BREATH: 0
FEVER: 0
DIARRHEA: 1

## 2022-02-27 NOTE — PROGRESS NOTES
"Subjective:   Diego Abarca is a 41 y.o. male who presents for Ear Pain (Right ear pain, growth on neck, ear lobe pain, diarrhea)      41-year-old male who notes bilateral neck itchiness and irritation, right-sided ear discomfort of the external ear.  He is also had occasional diarrhea.  He works as an  and reports that he has been in some dirty crawl spaces in the preceding week.  He inconsistently uses full PPE's to.  He has not noticed any fevers or chills, decreased hearing or muffled hearing.  He was seen around a week ago and was treated with mupirocin for nasal folliculitis.  He reports that that has been improving.      Review of Systems   Constitutional: Negative for chills and fever.   HENT: Positive for ear pain. Negative for congestion and sore throat.    Eyes: Negative for pain.   Respiratory: Negative for cough and shortness of breath.    Cardiovascular: Negative for chest pain.   Gastrointestinal: Positive for diarrhea. Negative for abdominal pain, constipation, nausea and vomiting.   Genitourinary: Negative for dysuria.   Musculoskeletal: Negative for myalgias.   Skin: Positive for itching and rash.   Neurological: Negative for headaches.       Medications, Allergies, and current problem list reviewed today in Epic.     Objective:     /60 (BP Location: Left arm, Patient Position: Sitting, BP Cuff Size: Adult)   Pulse (!) 52   Temp 36.2 °C (97.2 °F) (Temporal)   Resp 16   Ht 1.854 m (6' 1\")   Wt 90.7 kg (200 lb)   SpO2 98%     Physical Exam  Vitals reviewed.   Constitutional:       Appearance: Normal appearance.   HENT:      Head: Normocephalic and atraumatic.      Right Ear: Tympanic membrane and ear canal normal.      Left Ear: Tympanic membrane and ear canal normal.      Ears:      Comments:   Nonspecific irritation and dermatitis of the right external ear.  No obvious redness, warmth, erythema.  Some reactive lymphadenopathy bilaterally.  Mild excoriated dry skin with " diffuse macules bilaterally.     Nose: Nose normal.      Comments: Improved swelling of left nare      Mouth/Throat:      Mouth: Mucous membranes are moist.   Eyes:      Conjunctiva/sclera: Conjunctivae normal.   Cardiovascular:      Rate and Rhythm: Normal rate and regular rhythm.   Pulmonary:      Effort: Pulmonary effort is normal.      Breath sounds: Normal breath sounds.   Abdominal:      Tenderness: There is no abdominal tenderness.   Skin:     General: Skin is warm and dry.      Capillary Refill: Capillary refill takes less than 2 seconds.   Neurological:      Mental Status: He is alert and oriented to person, place, and time.         Assessment/Plan:     Diagnosis and associated orders:     1. Allergic contact dermatitis due to other agents        Comments/MDM:     • No sign of otitis externa or bacterial skin infection.  Patient likely is experiencing some allergic skin reaction to fiberglass or some other allergen that he is exposed to at work.  Recommend use of salicylate face wash, moisturizing barrier cream, full PPE at work.  Return if worsening.  Nasal folliculitis appears nearly resolved.         Differential diagnosis, natural history, supportive care, and indications for immediate follow-up discussed.    Advised the patient to follow-up with the primary care physician for recheck, reevaluation, and consideration of further management.    Please note that this dictation was created using voice recognition software. I have made a reasonable attempt to correct obvious errors, but I expect that there are errors of grammar and possibly content that I did not discover before finalizing the note.    This note was electronically signed by Lefty Hagen PA-C

## 2022-03-10 ENCOUNTER — TELEPHONE (OUTPATIENT)
Dept: URGENT CARE | Facility: CLINIC | Age: 42
End: 2022-03-10

## 2022-03-10 DIAGNOSIS — L73.9 NASAL FOLLICULITIS: ICD-10-CM

## 2022-03-10 DIAGNOSIS — L30.1 DYSHIDROTIC ECZEMA: ICD-10-CM

## 2022-03-10 DIAGNOSIS — L23.89 ALLERGIC CONTACT DERMATITIS DUE TO OTHER AGENTS: ICD-10-CM

## 2022-03-10 DIAGNOSIS — L72.9 CYST OF SKIN: ICD-10-CM

## 2022-03-15 ENCOUNTER — OFFICE VISIT (OUTPATIENT)
Dept: MEDICAL GROUP | Facility: PHYSICIAN GROUP | Age: 42
End: 2022-03-15
Payer: COMMERCIAL

## 2022-03-15 VITALS
HEIGHT: 73 IN | TEMPERATURE: 97.7 F | DIASTOLIC BLOOD PRESSURE: 60 MMHG | HEART RATE: 63 BPM | BODY MASS INDEX: 27.28 KG/M2 | RESPIRATION RATE: 16 BRPM | SYSTOLIC BLOOD PRESSURE: 100 MMHG | WEIGHT: 205.8 LBS | OXYGEN SATURATION: 97 %

## 2022-03-15 DIAGNOSIS — K59.09 OTHER CONSTIPATION: ICD-10-CM

## 2022-03-15 DIAGNOSIS — L30.9 DERMATITIS: ICD-10-CM

## 2022-03-15 PROCEDURE — 99213 OFFICE O/P EST LOW 20 MIN: CPT | Performed by: FAMILY MEDICINE

## 2022-03-15 RX ORDER — PREDNISONE 10 MG/1
10 TABLET ORAL DAILY
Qty: 14 TABLET | Refills: 0 | Status: SHIPPED | OUTPATIENT
Start: 2022-03-15 | End: 2022-03-21

## 2022-03-15 ASSESSMENT — ENCOUNTER SYMPTOMS
DIZZINESS: 0
SHORTNESS OF BREATH: 0
SORE THROAT: 0
CHILLS: 0
VOMITING: 0
BLURRED VISION: 0
DOUBLE VISION: 0
FEVER: 0
COUGH: 0
PALPITATIONS: 0
MYALGIAS: 0
CONSTIPATION: 1
NAUSEA: 0
HEADACHES: 0

## 2022-03-16 NOTE — PROGRESS NOTES
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 4 tabs po for 2 days, then 2 tabs po for 2 days, then  1 tab po for 2 days 14 Tablet 0   • mupirocin (BACTROBAN) 2 % nasal ointment One half tube in each nostril twice a day for 5 days (Patient not taking: No sig reported) 10 g 0     No current Epic-ordered facility-administered medications on file.     ROS:  Review of Systems   Constitutional: Negative for chills and fever.   HENT: Negative for ear pain and sore throat.    Eyes: Negative for blurred vision and double vision.   Respiratory: Negative for cough and shortness of breath.    Cardiovascular: Negative for chest pain and palpitations.   Gastrointestinal: Positive for constipation. Negative for nausea and vomiting.   Genitourinary: Negative for dysuria and hematuria.   Musculoskeletal: Negative for myalgias.   Neurological: Negative for dizziness and headaches.       Objective:     Exam:  /60 (BP Location: Left arm, Patient Position: Sitting, BP Cuff Size: Adult)   Pulse 63   Temp 36.5 °C (97.7 °F) (Temporal)   Resp 16   Ht 1.854 m (6' 1\")   Wt 93.4 kg (205 lb 12.8 oz)   SpO2 97%   BMI 27.15 kg/m²  Body mass index is 27.15 kg/m².    Physical Exam  Constitutional:       General: He is not in acute distress.     Appearance: Normal appearance. He is not ill-appearing, toxic-appearing or diaphoretic.   HENT: "      Head: Normocephalic and atraumatic.     Eyes:      General: No scleral icterus.        Right eye: No discharge.         Left eye: No discharge.      Extraocular Movements: Extraocular movements intact.      Conjunctiva/sclera: Conjunctivae normal.      Pupils: Pupils are equal, round, and reactive to light.   Pulmonary:      Effort: Pulmonary effort is normal. No respiratory distress.   Abdominal:      General: There is no distension.      Tenderness: There is no abdominal tenderness. There is no guarding.   Skin:     Findings: Rash present.   Neurological:      Mental Status: He is alert.      Coordination: Coordination normal.      Gait: Gait normal.         Assessment & Plan:     41 y.o. male with the following -     Problem List Items Addressed This Visit     Dermatitis     Chronic  Likely Maskne as it is in the cheek area from his mask?  Pt would like something to make it go away  Already has referral to derm  Trial of steroid  rtc if concerns         Relevant Medications    predniSONE (DELTASONE) 10 MG Tab    Other constipation        Return if symptoms worsen or fail to improve.    Please note that this dictation was created using voice recognition software. I have made every reasonable attempt to correct obvious errors, but I expect that there are errors of grammar and possibly content that I did not discover before finalizing the note.

## 2022-03-16 NOTE — ASSESSMENT & PLAN NOTE
Chronic  Likely Maskne as it is in the cheek area from his mask?  Pt would like something to make it go away  Already has referral to derm  Trial of steroid  rtc if concerns

## 2022-04-20 ENCOUNTER — HOSPITAL ENCOUNTER (EMERGENCY)
Facility: MEDICAL CENTER | Age: 42
End: 2022-04-20
Attending: EMERGENCY MEDICINE
Payer: COMMERCIAL

## 2022-04-20 ENCOUNTER — APPOINTMENT (OUTPATIENT)
Dept: RADIOLOGY | Facility: MEDICAL CENTER | Age: 42
End: 2022-04-20
Attending: EMERGENCY MEDICINE
Payer: COMMERCIAL

## 2022-04-20 VITALS
WEIGHT: 210.98 LBS | RESPIRATION RATE: 16 BRPM | OXYGEN SATURATION: 100 % | DIASTOLIC BLOOD PRESSURE: 76 MMHG | HEART RATE: 55 BPM | BODY MASS INDEX: 27.96 KG/M2 | TEMPERATURE: 97.1 F | HEIGHT: 73 IN | SYSTOLIC BLOOD PRESSURE: 141 MMHG

## 2022-04-20 DIAGNOSIS — K59.09 OTHER CONSTIPATION: ICD-10-CM

## 2022-04-20 DIAGNOSIS — R10.9 ABDOMINAL PAIN, UNSPECIFIED ABDOMINAL LOCATION: ICD-10-CM

## 2022-04-20 DIAGNOSIS — K59.00 CONSTIPATION, UNSPECIFIED CONSTIPATION TYPE: ICD-10-CM

## 2022-04-20 LAB
ALBUMIN SERPL BCP-MCNC: 4.5 G/DL (ref 3.2–4.9)
ALBUMIN/GLOB SERPL: 1.7 G/DL
ALP SERPL-CCNC: 93 U/L (ref 30–99)
ALT SERPL-CCNC: 117 U/L (ref 2–50)
ANION GAP SERPL CALC-SCNC: 10 MMOL/L (ref 7–16)
APPEARANCE UR: CLEAR
AST SERPL-CCNC: 60 U/L (ref 12–45)
BASOPHILS # BLD AUTO: 1.5 % (ref 0–1.8)
BASOPHILS # BLD: 0.09 K/UL (ref 0–0.12)
BILIRUB SERPL-MCNC: 0.3 MG/DL (ref 0.1–1.5)
BILIRUB UR QL STRIP.AUTO: NEGATIVE
BUN SERPL-MCNC: 14 MG/DL (ref 8–22)
CALCIUM SERPL-MCNC: 9.4 MG/DL (ref 8.4–10.2)
CHLORIDE SERPL-SCNC: 102 MMOL/L (ref 96–112)
CO2 SERPL-SCNC: 26 MMOL/L (ref 20–33)
COLOR UR: YELLOW
CREAT SERPL-MCNC: 0.88 MG/DL (ref 0.5–1.4)
EOSINOPHIL # BLD AUTO: 0.15 K/UL (ref 0–0.51)
EOSINOPHIL NFR BLD: 2.4 % (ref 0–6.9)
ERYTHROCYTE [DISTWIDTH] IN BLOOD BY AUTOMATED COUNT: 38.8 FL (ref 35.9–50)
GFR SERPLBLD CREATININE-BSD FMLA CKD-EPI: 111 ML/MIN/1.73 M 2
GLOBULIN SER CALC-MCNC: 2.7 G/DL (ref 1.9–3.5)
GLUCOSE SERPL-MCNC: 110 MG/DL (ref 65–99)
GLUCOSE UR STRIP.AUTO-MCNC: NEGATIVE MG/DL
HCT VFR BLD AUTO: 50.3 % (ref 42–52)
HGB BLD-MCNC: 16.8 G/DL (ref 14–18)
IMM GRANULOCYTES # BLD AUTO: 0.02 K/UL (ref 0–0.11)
IMM GRANULOCYTES NFR BLD AUTO: 0.3 % (ref 0–0.9)
KETONES UR STRIP.AUTO-MCNC: NEGATIVE MG/DL
LEUKOCYTE ESTERASE UR QL STRIP.AUTO: NEGATIVE
LIPASE SERPL-CCNC: 33 U/L (ref 7–58)
LYMPHOCYTES # BLD AUTO: 1.93 K/UL (ref 1–4.8)
LYMPHOCYTES NFR BLD: 31.3 % (ref 22–41)
MCH RBC QN AUTO: 28.5 PG (ref 27–33)
MCHC RBC AUTO-ENTMCNC: 33.4 G/DL (ref 33.7–35.3)
MCV RBC AUTO: 85.4 FL (ref 81.4–97.8)
MICRO URNS: NORMAL
MONOCYTES # BLD AUTO: 0.63 K/UL (ref 0–0.85)
MONOCYTES NFR BLD AUTO: 10.2 % (ref 0–13.4)
NEUTROPHILS # BLD AUTO: 3.35 K/UL (ref 1.82–7.42)
NEUTROPHILS NFR BLD: 54.3 % (ref 44–72)
NITRITE UR QL STRIP.AUTO: NEGATIVE
NRBC # BLD AUTO: 0 K/UL
NRBC BLD-RTO: 0 /100 WBC
PH UR STRIP.AUTO: 5.5 [PH] (ref 5–8)
PLATELET # BLD AUTO: 312 K/UL (ref 164–446)
PMV BLD AUTO: 9.1 FL (ref 9–12.9)
POTASSIUM SERPL-SCNC: 4.5 MMOL/L (ref 3.6–5.5)
PROT SERPL-MCNC: 7.2 G/DL (ref 6–8.2)
PROT UR QL STRIP: NEGATIVE MG/DL
RBC # BLD AUTO: 5.89 M/UL (ref 4.7–6.1)
RBC UR QL AUTO: NEGATIVE
SODIUM SERPL-SCNC: 138 MMOL/L (ref 135–145)
SP GR UR STRIP.AUTO: 1.02
T4 FREE SERPL-MCNC: 1.16 NG/DL (ref 0.93–1.7)
TSH SERPL DL<=0.005 MIU/L-ACNC: 2.11 UIU/ML (ref 0.38–5.33)
WBC # BLD AUTO: 6.2 K/UL (ref 4.8–10.8)

## 2022-04-20 PROCEDURE — 85025 COMPLETE CBC W/AUTO DIFF WBC: CPT

## 2022-04-20 PROCEDURE — 83690 ASSAY OF LIPASE: CPT

## 2022-04-20 PROCEDURE — 81003 URINALYSIS AUTO W/O SCOPE: CPT

## 2022-04-20 PROCEDURE — 700117 HCHG RX CONTRAST REV CODE 255: Performed by: EMERGENCY MEDICINE

## 2022-04-20 PROCEDURE — 84439 ASSAY OF FREE THYROXINE: CPT

## 2022-04-20 PROCEDURE — 84443 ASSAY THYROID STIM HORMONE: CPT

## 2022-04-20 PROCEDURE — 99284 EMERGENCY DEPT VISIT MOD MDM: CPT

## 2022-04-20 PROCEDURE — 36415 COLL VENOUS BLD VENIPUNCTURE: CPT

## 2022-04-20 PROCEDURE — 80053 COMPREHEN METABOLIC PANEL: CPT

## 2022-04-20 PROCEDURE — 74177 CT ABD & PELVIS W/CONTRAST: CPT

## 2022-04-20 RX ORDER — ALPRAZOLAM 0.5 MG/1
0.5 TABLET ORAL 3 TIMES DAILY PRN
Status: SHIPPED | COMMUNITY
End: 2022-04-27 | Stop reason: SDUPTHER

## 2022-04-20 RX ORDER — POLYETHYLENE GLYCOL 3350 17 G/17G
17 POWDER, FOR SOLUTION ORAL DAILY
Qty: 7 EACH | Refills: 0 | Status: SHIPPED | OUTPATIENT
Start: 2022-04-20 | End: 2022-04-27 | Stop reason: SDUPTHER

## 2022-04-20 RX ORDER — DOCUSATE SODIUM 100 MG/1
100 CAPSULE, LIQUID FILLED ORAL 2 TIMES DAILY
Qty: 28 CAPSULE | Refills: 0 | Status: SHIPPED | OUTPATIENT
Start: 2022-04-20 | End: 2022-05-04

## 2022-04-20 RX ADMIN — IOHEXOL 100 ML: 350 INJECTION, SOLUTION INTRAVENOUS at 09:28

## 2022-04-20 ASSESSMENT — PAIN DESCRIPTION - PAIN TYPE: TYPE: ACUTE PAIN

## 2022-04-20 NOTE — DISCHARGE INSTRUCTIONS
Return to the ER for any worsening abdominal pain, changing abdominal pain, worsening constipation, blood in stool, fevers over 100.4, shaking chills, nausea, vomiting, difficulty urinating, or for any concerns.      Increase the amount of fruits, vegetables, fiber and water in your diet.      Follow-up with your primary care physician within the next 1 to 2 days.  Please call for appointment.      You can also follow-up with Dr. Mcintyre, gastroenterologist, within the next 1 week.  Please call for appointment.

## 2022-04-20 NOTE — ED PROVIDER NOTES
"ED Provider Note  CHIEF COMPLAINT  Chief Complaint   Patient presents with   • Abdominal Pain       HPI  Diego Abarca is a 41 y.o. male who presents to the ER with complaint of constipation since February 2022.  He also complains of intermittent lower abdominal pain and bloating and distention since February.  He has tried Ex-Lax recently without much improvement.  He tried a few days of MiraLAX.  No relief.  He spoke with his primary care physician who told him to change his diet.  He has been eating more fruits and vegetables without any improvement.  He says he is defecating, but only \"small amounts.\"  His last bowel movement was on Saturday (3 days ago.)  Patient says he has gained weight, not lost weight.  He is eating normally.  No nausea or vomiting.  No difficulty urinating although he says his urine looks a little darker than normal lately.  No fevers or chills.  He stopped drinking alcohol 9 months ago.  He stopped smoking pot 4 months ago. He does have history of IBS.  He reports it was noted to be \"IBS-D\" because he was having diarrhea with his IBS as opposed to constipation.  [facility].  He has not followed up with them in quite some time.     REVIEW OF SYSTEMS  See HPI for further details.  Positive for abdominal pain, bloating, constipation.  Negative for diarrhea, blood in stool, nausea, vomiting, decreased appetite, fever, chills.  All other systems are negative.    PAST MEDICAL HISTORY  Past Medical History:   Diagnosis Date   • Alpha 1-antitrypsin PiMS phenotype    • Asthma    • Chickenpox    • Japanese measles    • Mumps        FAMILY HISTORY  Family History   Problem Relation Age of Onset   • No Known Problems Mother    • Diabetes Maternal Grandfather    • Heart Disease Maternal Grandfather    • Stroke Other    • Cancer Child         leukemia type B   • Drug abuse Neg Hx    • Alcohol abuse Neg Hx        SOCIAL HISTORY  Social History     Socioeconomic History   • " "Marital status:    Tobacco Use   • Smoking status: Never Smoker   • Smokeless tobacco: Never Used   Vaping Use   • Vaping Use: Never used   Substance and Sexual Activity   • Alcohol use: Not Currently     Comment: 6 months    • Drug use: Not Currently     Types: Marijuana     Comment: not for 2 months    • Sexual activity: Yes     Partners: Female     Comment: wife       SURGICAL HISTORY  Past Surgical History:   Procedure Laterality Date   • ARTHROPLASTY  01/20/2021    RT ankle    • OTHER ORTHOPEDIC SURGERY Left 2002    knee       CURRENT MEDICATIONS  Home Medications    **Home medications have not yet been reviewed for this encounter**         ALLERGIES  Allergies   Allergen Reactions   • Azithromycin        PHYSICAL EXAM  VITAL SIGNS: /104   Pulse 60   Temp 36.4 °C (97.6 °F) (Temporal)   Resp 18   Ht 1.854 m (6' 1\")   Wt 95.7 kg (210 lb 15.7 oz)   SpO2 97%   BMI 27.84 kg/m²      Constitutional: Well developed, well nourished; No acute distress; Non-toxic appearance.   HENT: Normocephalic, atraumatic; Bilateral external ears normal; oropharyngeal examination deferred due to COVID-19 outbreak and lack of oropharyngeal complaint  Eyes: PERRL, EOMI, Conjunctiva normal. No discharge.   Neck:  Supple, nontender midline; No stridor; No nuchal rigidity.   Lymphatic: No cervical lymphadenopathy noted.   Cardiovascular: Regular rate and rhythm without murmurs, rubs, or gallop.   Thorax & Lungs: No respiratory distress, breath sounds clear to auscultation bilaterally without wheezing, rales or rhonchi. Nontender chest wall. No crepitus or subcutaneous air  Abdomen: Soft, nontender, bowel sounds slightly decreased. No obvious masses; No pulsatile masses; no rebound, guarding, or peritoneal signs.  No tenderness in the right lower quadrant.  Skin: Good color; warm and dry without rash or petechia.  Back: Nontender, No CVA tenderness.   Extremities: Distal radial, dorsalis pedis, posterior tibial pulses are " equal bilaterally; No edema; Nontender calves or saphenous, No cyanosis, No clubbing.   Musculoskeletal: Good range of motion in all major joints. No tenderness to palpation or major deformities noted.   Neurologic: Alert & oriented x 4, clear speech      RADIOLOGY/PROCEDURES  CT-ABDOMEN-PELVIS WITH   Final Result         1. No acute inflammatory change in the abdomen or pelvis.      2. Moderate amount of stool throughout the colon.      3. The appendix measures 7 mm in diameter, borderline. No surrounding stranding to suggest appendicitis.          COURSE & MEDICAL DECISION MAKING  Pertinent Labs & Imaging studies reviewed. (See chart for details)    Results for orders placed or performed during the hospital encounter of 04/20/22   TSH   Result Value Ref Range    TSH 2.110 0.380 - 5.330 uIU/mL   CBC WITH DIFFERENTIAL   Result Value Ref Range    WBC 6.2 4.8 - 10.8 K/uL    RBC 5.89 4.70 - 6.10 M/uL    Hemoglobin 16.8 14.0 - 18.0 g/dL    Hematocrit 50.3 42.0 - 52.0 %    MCV 85.4 81.4 - 97.8 fL    MCH 28.5 27.0 - 33.0 pg    MCHC 33.4 (L) 33.7 - 35.3 g/dL    RDW 38.8 35.9 - 50.0 fL    Platelet Count 312 164 - 446 K/uL    MPV 9.1 9.0 - 12.9 fL    Neutrophils-Polys 54.30 44.00 - 72.00 %    Lymphocytes 31.30 22.00 - 41.00 %    Monocytes 10.20 0.00 - 13.40 %    Eosinophils 2.40 0.00 - 6.90 %    Basophils 1.50 0.00 - 1.80 %    Immature Granulocytes 0.30 0.00 - 0.90 %    Nucleated RBC 0.00 /100 WBC    Neutrophils (Absolute) 3.35 1.82 - 7.42 K/uL    Lymphs (Absolute) 1.93 1.00 - 4.80 K/uL    Monos (Absolute) 0.63 0.00 - 0.85 K/uL    Eos (Absolute) 0.15 0.00 - 0.51 K/uL    Baso (Absolute) 0.09 0.00 - 0.12 K/uL    Immature Granulocytes (abs) 0.02 0.00 - 0.11 K/uL    NRBC (Absolute) 0.00 K/uL   COMP METABOLIC PANEL   Result Value Ref Range    Sodium 138 135 - 145 mmol/L    Potassium 4.5 3.6 - 5.5 mmol/L    Chloride 102 96 - 112 mmol/L    Co2 26 20 - 33 mmol/L    Anion Gap 10.0 7.0 - 16.0    Glucose 110 (H) 65 - 99 mg/dL    Bun 14  "8 - 22 mg/dL    Creatinine 0.88 0.50 - 1.40 mg/dL    Calcium 9.4 8.4 - 10.2 mg/dL    AST(SGOT) 60 (H) 12 - 45 U/L    ALT(SGPT) 117 (H) 2 - 50 U/L    Alkaline Phosphatase 93 30 - 99 U/L    Total Bilirubin 0.3 0.1 - 1.5 mg/dL    Albumin 4.5 3.2 - 4.9 g/dL    Total Protein 7.2 6.0 - 8.2 g/dL    Globulin 2.7 1.9 - 3.5 g/dL    A-G Ratio 1.7 g/dL   LIPASE   Result Value Ref Range    Lipase 33 7 - 58 U/L   URINALYSIS (UA)    Specimen: Urine   Result Value Ref Range    Color Yellow     Character Clear     Specific Gravity 1.025 <1.035    Ph 5.5 5.0 - 8.0    Glucose Negative Negative mg/dL    Ketones Negative Negative mg/dL    Protein Negative Negative mg/dL    Bilirubin Negative Negative    Nitrite Negative Negative    Leukocyte Esterase Negative Negative    Occult Blood Negative Negative    Micro Urine Req see below    FREE THYROXINE   Result Value Ref Range    Free T-4 1.16 0.93 - 1.70 ng/dL   ESTIMATED GFR   Result Value Ref Range    GFR (CKD-EPI) 111 >60 mL/min/1.73 m 2       Patient presents to the ER complaining of constipation since February 2022.  His last bowel movement was 3 days ago.  He said that he is only defecating small amounts.  He does have history of IBS.  He reports it was noted to be \"IBS-D\" because he was having diarrhea with his IBS as opposed to constipation.  [facility].  He has not followed up with them in quite some time.  He says he has been taking Ex-Lax, eating fiber, and trying to eat more fruits and vegetables without any improvement.  His abdomen is benign on examination here today.  There is no tenderness in the right lower quadrant.  CT scan reveals a borderline enlarged appendix, but no signs of inflammation around it.  I do not think the patient has appendicitis at this time.  He is well-appearing.  His vital signs are normal stable.  Urine is clear.  Labs are unremarkable.  No electrolyte disturbance or abnormal thyroid function tests to account for his " constipation.  Patient has moderate stool seen on his CT scan.  I have asked that the patient take MiraLAX for a week and then transition to Colace after having a few good bowel movements.  He is to continue eating increased fiber, fruits, and vegetables.  He is to drink plenty of water.  I have asked that the patient follow back up with his primary care physician as well as with [facility].  He is to call for appointments.  He is safe and stable for outpatient management and discharge home.  At this time no evidence of obstruction, perforation, appendicitis, colitis, diverticulitis, cholecystitis, ureteral stone, or any other dangerous intra-abdominal pathology.  Patient has been given strict return precautions and discharge instructions and he understands treatment plan and follow-up.      I verified that the patient was wearing a mask and I was wearing appropriate PPE every time I entered the room. The patient's mask was on the patient at all times during my encounter    FINAL IMPRESSION  1. Constipation, unspecified constipation type Acute    2. Abdominal pain, unspecified abdominal location Acute    3. Other constipation  polyethylene glycol/lytes (MIRALAX) 17 g Pack    docusate sodium (COLACE) 100 MG Cap        This dictation has been created using voice recognition software. The accuracy of the dictation is limited by the abilities of the software. I expect there may be some errors of grammar and possibly content. I made every attempt to manually correct the errors within my dictation. However, errors related to voice recognition software may still exist and should be interpreted within the appropriate context.    Electronically signed by: Leny Steiner M.D., 4/20/2022 7:35 AM

## 2022-04-20 NOTE — ED TRIAGE NOTES
"Pt amb to triage c/o RLQ pain and constipation >2 months. Pt states he stopped smoking marijuana x6 mo ago. Pt using miralax and exlax \"every few day\" for BM  "

## 2022-04-23 ENCOUNTER — OFFICE VISIT (OUTPATIENT)
Dept: URGENT CARE | Facility: CLINIC | Age: 42
End: 2022-04-23
Payer: COMMERCIAL

## 2022-04-23 VITALS
HEART RATE: 70 BPM | TEMPERATURE: 98.1 F | RESPIRATION RATE: 16 BRPM | SYSTOLIC BLOOD PRESSURE: 124 MMHG | BODY MASS INDEX: 26.31 KG/M2 | DIASTOLIC BLOOD PRESSURE: 82 MMHG | WEIGHT: 205 LBS | OXYGEN SATURATION: 97 % | HEIGHT: 74 IN

## 2022-04-23 DIAGNOSIS — M79.671 RIGHT FOOT PAIN: ICD-10-CM

## 2022-04-23 DIAGNOSIS — Z87.39 PERSONAL HISTORY OF GOUT: ICD-10-CM

## 2022-04-23 DIAGNOSIS — S86.811A STRAIN OF RIGHT CALF MUSCLE: ICD-10-CM

## 2022-04-23 DIAGNOSIS — S96.911A STRAIN OF RIGHT FOOT, INITIAL ENCOUNTER: ICD-10-CM

## 2022-04-23 PROCEDURE — 99213 OFFICE O/P EST LOW 20 MIN: CPT | Performed by: NURSE PRACTITIONER

## 2022-04-23 RX ORDER — PREDNISONE 20 MG/1
60 TABLET ORAL DAILY
Qty: 15 TABLET | Refills: 0 | Status: SHIPPED | OUTPATIENT
Start: 2022-04-23 | End: 2022-04-28

## 2022-04-23 RX ORDER — TIZANIDINE HYDROCHLORIDE 2 MG/1
2 CAPSULE, GELATIN COATED ORAL EVERY 8 HOURS PRN
Qty: 30 CAPSULE | Refills: 0 | Status: SHIPPED | OUTPATIENT
Start: 2022-04-23 | End: 2022-10-25

## 2022-04-23 ASSESSMENT — ENCOUNTER SYMPTOMS
FEVER: 0
CARDIOVASCULAR NEGATIVE: 1
NEUROLOGICAL NEGATIVE: 1
RESPIRATORY NEGATIVE: 1
CONSTITUTIONAL NEGATIVE: 1

## 2022-04-23 ASSESSMENT — VISUAL ACUITY: OU: 1

## 2022-04-23 ASSESSMENT — FIBROSIS 4 INDEX: FIB4 SCORE: 0.71

## 2022-04-23 NOTE — PROGRESS NOTES
Subjective:     Diego Abarca is a 41 y.o. male who presents for Gout (4x days, (R) foot, liver enzymes/ glucose were high 4/20 at er, rash on face )       Foot Problem  This is a new problem. The problem has been gradually worsening. Pertinent negatives include no fever.      About 4 days ago, patient started to experience spontaneous onset of right foot pain at the lateral aspect and at his toes.  Reports a history of gout.  However, does not notice redness or visible swelling.  Also reporting mild pain at his right lower leg and his right knee.  Taking ibuprofen with no improvement in the symptoms.    Prior external notes from unique source dated 4/20/2022 reviewed: Patient seen in the ER complains of constipation.  History of IBS.  Patient advised on MiraLAX for a week then transitioning to Colace.    Patient was screened prior to rooming and denied COVID-19 diagnosis or contact with a person who has been diagnosed or is suspected to have COVID-19. During this visit, appropriate PPE was worn, hand hygiene was performed, and the patient and any visitors were masked.     PMH:  has a past medical history of Alpha 1-antitrypsin PiMS phenotype, Asthma, Chickenpox, Constipation, Maltese measles, Irritable bowel syndrome (IBS), and Mumps.    MEDS:   Current Outpatient Medications:   •  predniSONE (DELTASONE) 20 MG Tab, Take 3 Tablets by mouth every day for 5 days., Disp: 15 Tablet, Rfl: 0  •  tizanidine (ZANAFLEX) 2 MG capsule, Take 1 Capsule by mouth every 8 hours as needed (Muscle spasm)., Disp: 30 Capsule, Rfl: 0  •  ALPRAZolam (XANAX) 0.5 MG Tab, Take 0.5 mg by mouth 3 times a day as needed for Anxiety., Disp: , Rfl:   •  diphenhydrAMINE-APAP, sleep, (EXCEDRIN PM PO), Take 1 Tablet by mouth at bedtime as needed (For pain and sleep)., Disp: , Rfl:   •  polyethylene glycol/lytes (MIRALAX) 17 g Pack, Take 1 Packet by mouth every day for 7 days. (Patient not taking: Reported on 4/23/2022), Disp: 7 Each, Rfl: 0  •   "docusate sodium (COLACE) 100 MG Cap, Take 1 Capsule by mouth 2 times a day for 14 days. (Patient not taking: Reported on 4/23/2022), Disp: 28 Capsule, Rfl: 0    ALLERGIES:   Allergies   Allergen Reactions   • Azithromycin Vomiting and Nausea       SURGHX:   Past Surgical History:   Procedure Laterality Date   • ARTHROPLASTY  01/20/2021    RT ankle    • OTHER ORTHOPEDIC SURGERY Left 2002    knee     SOCHX:  reports that he has never smoked. He has never used smokeless tobacco. He reports previous alcohol use. He reports previous drug use. Drug: Marijuana.     FH: Reviewed with patient, not pertinent to this visit.    Review of Systems   Constitutional: Negative.  Negative for fever.   Respiratory: Negative.    Cardiovascular: Negative.    Musculoskeletal:        Right foot, leg, and knee pain   Neurological: Negative.    All other systems reviewed and are negative.    Additional details per HPI.      Objective:     /82 (BP Location: Left arm, Patient Position: Sitting, BP Cuff Size: Adult)   Pulse 70   Temp 36.7 °C (98.1 °F) (Temporal)   Resp 16   Ht 1.88 m (6' 2\")   Wt 93 kg (205 lb)   SpO2 97%   BMI 26.32 kg/m²     Physical Exam  Vitals reviewed.   Constitutional:       General: He is not in acute distress.     Appearance: He is well-developed. He is not ill-appearing or toxic-appearing.   Eyes:      General: Vision grossly intact.      Extraocular Movements: Extraocular movements intact.   Cardiovascular:      Rate and Rhythm: Normal rate.   Pulmonary:      Effort: Pulmonary effort is normal. No respiratory distress.   Musculoskeletal:         General: No deformity. Normal range of motion.      Cervical back: Normal range of motion.      Right knee: No swelling or deformity.      Right lower leg: No swelling, deformity or tenderness. No edema.      Right ankle: Normal.      Right foot: Normal range of motion and normal capillary refill. Tenderness (Lateral midfoot and forefoot; toes) present. No " swelling, deformity or laceration.   Skin:     General: Skin is warm and dry.      Coloration: Skin is not pale.      Findings: No bruising, erythema or rash.   Neurological:      Mental Status: He is alert and oriented to person, place, and time.      Sensory: No sensory deficit.      Motor: No weakness.      Gait: Gait normal.   Psychiatric:         Behavior: Behavior normal. Behavior is cooperative.       Assessment/Plan:     1. Right foot pain  - predniSONE (DELTASONE) 20 MG Tab; Take 3 Tablets by mouth every day for 5 days.  Dispense: 15 Tablet; Refill: 0    2. Strain of right foot, initial encounter  - predniSONE (DELTASONE) 20 MG Tab; Take 3 Tablets by mouth every day for 5 days.  Dispense: 15 Tablet; Refill: 0    3. Strain of right calf muscle  - tizanidine (ZANAFLEX) 2 MG capsule; Take 1 Capsule by mouth every 8 hours as needed (Muscle spasm).  Dispense: 30 Capsule; Refill: 0    4. Personal history of gout    Rx as above sent electronically. Advised to avoid NSAIDs while on steroid therapy. Caution drowsiness with muscle relaxant.    Differential diagnosis, natural history, supportive care, over-the-counter symptom management per 's instructions, close monitoring, and indications for immediate follow-up discussed.     All questions answered. Patient agrees with the plan of care.    [facility]

## 2022-04-24 NOTE — PATIENT INSTRUCTIONS
Foot Pain  Many things can cause foot pain. Some common causes are:  · An injury.  · A sprain.  · Arthritis.  · Blisters.  · Bunions.  Follow these instructions at home:  Managing pain, stiffness, and swelling  If directed, put ice on the painful area:  · Put ice in a plastic bag.  · Place a towel between your skin and the bag.  · Leave the ice on for 20 minutes, 2-3 times a day.    Activity  · Do not stand or walk for long periods.  · Return to your normal activities as told by your health care provider. Ask your health care provider what activities are safe for you.  · Do stretches to relieve foot pain and stiffness as told by your health care provider.  · Do not lift anything that is heavier than 10 lb (4.5 kg), or the limit that you are told, until your health care provider says that it is safe. Lifting a lot of weight can put added pressure on your feet.  Lifestyle  · Wear comfortable, supportive shoes that fit you well. Do not wear high heels.  · Keep your feet clean and dry.  General instructions  · Take over-the-counter and prescription medicines only as told by your health care provider.  · Rub your foot gently.  · Pay attention to any changes in your symptoms.  · Keep all follow-up visits as told by your health care provider. This is important.  Contact a health care provider if:  · Your pain does not get better after a few days of self-care.  · Your pain gets worse.  · You cannot stand on your foot.  Get help right away if:  · Your foot is numb or tingling.  · Your foot or toes are swollen.  · Your foot or toes turn white or blue.  · You have warmth and redness along your foot.  Summary  · Common causes of foot pain are injury, sprain, arthritis, blisters or bunions.  · Ice, medicines, and comfortable shoes may help foot pain.  · Contact your health care provider if your pain does not get better after a few days of self-care.  This information is not intended to replace advice given to you by your health  care provider. Make sure you discuss any questions you have with your health care provider.  Document Released: 01/13/2017 Document Revised: 10/03/2019 Document Reviewed: 10/03/2019  Elsevier Patient Education © 2020 [employer]        Repetitive Strain Injuries  Repetitive strain injuries (RSIs) result from the overuse or misuse of muscles, nerves, or tissues that connect muscle to bone (tendons). RSIs can affect almost any part of the body. However, RSIs are most common in:  · The arms, including the thumbs, wrists, elbows, and shoulders.  · The legs, including the ankles and knees.  Repetitive strain often causes certain common medical conditions, such as carpal tunnel syndrome, trigger finger, tennis elbow, and golfer's elbow.  What are the causes?  RSIs are caused by repeating the same activity for long periods of time without enough rest. Repetitive strain often results from activities done:  · At work, such as typing for long periods of time.  · During a hobby or sport.  What increases the risk?  Certain workplace and personal factors may make you more likely to develop an RSI.  Workplace risk factors  · Frequent computer use.  · Not taking rest breaks.  · Doing motions over and over again.  · Working in an awkward position or holding the same position for a long time.  · Forceful movements, such as lifting, pulling, or pushing.  · Exposure to the vibration caused by using power tools.  · Working in cold temperatures.  Personal risk factors  · Having poor posture.  · Not exercising regularly.  · Being overweight.  · Having long-term (chronic) medical conditions, such as arthritis, diabetes, or thyroid problems.  · Having vitamin deficiencies.  What are the signs or symptoms?  You may feel these symptoms at the affected body part:  · Burning, shooting, or aching pain.  · Tenderness.  · Swelling.  · Tingling or numbness.  · Weakness, heaviness, or loss of coordination.  · Fatigue.  · Muscle  stiffness.  · Sudden, involuntary muscle tightening (spasms).  · Difficulty moving.  How is this diagnosed?  An RSI may be diagnosed with a physical exam and an evaluation of your everyday activities. You may have tests, including:  · X-rays.  · Electromyogram (EMG). This test measures electrical signals that your nerves send to your muscles.  How is this treated?  Treatment depends on the severity and type of RSI you have. Treatment may include:  · Rest.  · Ice or heat therapy.  · NSAIDS to reduce pain and swelling.  · Steroid injections.  · Wearing a splint.  · Wearing a wrap that applies pressure (compression bandage).  · Having occupational or physical therapy.  · Having surgery.  RSIs may take weeks or months to heal, depending on your condition.  Follow these instructions at home:  If you have a splint:  · Wear the splint as told by your health care provider. Remove it only as told by your health care provider.  · Loosen the splint if your fingers or toes tingle, become numb, or turn cold and blue.  · Keep the splint clean.  · If the splint is not waterproof:  ? Do not let it get wet.  ? Cover it with a watertight covering when you take a bath or shower.  Managing pain, stiffness, and swelling         · If directed, put ice on the injured area.  ? If you have a removable splint, remove it as told by your health care provider.  ? Put ice in a plastic bag.  ? Place a towel between your skin and the bag.  ? Leave the ice on for 20 minutes, 2-3 times a day.  · If directed, apply heat to the affected area as often as told by your health care provider. Use the heat source that your health care provider recommends, such as a moist heat pack or a heating pad.  ? Place a towel between your skin and the heat source.  ? Leave the heat on for 20-30 minutes.  ? Remove the heat if your skin turns bright red. This is especially important if you are unable to feel pain, heat, or cold. You may have a greater risk of getting  burned.  · If your arm or leg is affected, move your fingers or toes often to reduce stiffness and swelling.  · If possible, raise (elevate) the injured area above the level of your heart while you are sitting or lying down.  Activity  · Rest your affected body part as told by your health care provider.  · Stop or modify activities that cause your symptoms or make them worse, as told by your health care provider.  · Return to your normal activities as told by your health care provider. Ask your health care provider what activities are safe for you.  · Do exercises as told by your health care provider.  General instructions  · Take over-the-counter and prescription medicines only as told by your health care provider.  · Wear a compression bandage as told by your health care provider.  · If your condition is work-related, talk to your employer about changes that can be made.  · Keep all follow-up visits as told by your health care provider. This is important.  How is this prevented?  Maintain good posture  Good posture means that:  · Your spine is in its natural position.  · Your feet are flat on the floor.  · Your knees are directly over your feet.  · Your arms are relaxed and at your sides.  · Your neck is relaxed and not bent forward or backward.  Adjust your work station  Arrange your work station so that you can maintain good posture. To do this:  · Sit in a chair that supports the natural curve of your lower back.  · Slide your chair under your desk so that you are close enough to reach your keyboard and computer mouse with your elbows at your side, bent at a right angle, and your forearms parallel to the ground.  · Have your computer monitor directly in front of you so that your eyes are level with the top of the screen. The screen should be about 15-25 inches (38.1-63.5 cm) from your eyes.    Other tips  · Take breaks often from any repeated activity. Alternate with another task that requires you to use  different muscles.  · Change positions regularly.  · Exercise regularly.  · Maintain a healthy weight.  Contact a health care provider if:  · You develop new symptoms.  · Your symptoms get worse or do not improve with medicine.  Summary  · Repetitive strain injuries (RSIs) result from the overuse or misuse of muscles, nerves, or tissues that connect muscle to bone (tendons).  · Symptoms are pain, burning, swelling, stiffness, weakness, numbness, or spasms in the affected area.  · These injuries are treated with rest, ice, heat, medicines, physical or occupational therapy, splinting, and surgery if needed.  This information is not intended to replace advice given to you by your health care provider. Make sure you discuss any questions you have with your health care provider.  Document Released: 12/08/2003 Document Revised: 11/14/2019 Document Reviewed: 11/14/2019  [employer] Patient Education © 2020 [employer]

## 2022-04-27 ENCOUNTER — OFFICE VISIT (OUTPATIENT)
Dept: MEDICAL GROUP | Facility: PHYSICIAN GROUP | Age: 42
End: 2022-04-27
Payer: COMMERCIAL

## 2022-04-27 VITALS
BODY MASS INDEX: 27.34 KG/M2 | HEIGHT: 74 IN | RESPIRATION RATE: 18 BRPM | HEART RATE: 64 BPM | WEIGHT: 213 LBS | SYSTOLIC BLOOD PRESSURE: 118 MMHG | OXYGEN SATURATION: 96 % | TEMPERATURE: 97.3 F | DIASTOLIC BLOOD PRESSURE: 68 MMHG

## 2022-04-27 DIAGNOSIS — R74.8 ELEVATED LIVER ENZYMES: ICD-10-CM

## 2022-04-27 DIAGNOSIS — F41.9 ANXIETY: ICD-10-CM

## 2022-04-27 DIAGNOSIS — K59.09 OTHER CONSTIPATION: ICD-10-CM

## 2022-04-27 PROCEDURE — 99213 OFFICE O/P EST LOW 20 MIN: CPT | Performed by: FAMILY MEDICINE

## 2022-04-27 RX ORDER — POLYETHYLENE GLYCOL 3350 17 G/17G
17 POWDER, FOR SOLUTION ORAL DAILY
Qty: 7 EACH | Refills: 3 | Status: SHIPPED | OUTPATIENT
Start: 2022-04-27 | End: 2022-05-04

## 2022-04-27 RX ORDER — ALPRAZOLAM 0.5 MG/1
0.5 TABLET ORAL NIGHTLY PRN
Qty: 30 TABLET | Refills: 0 | Status: SHIPPED | OUTPATIENT
Start: 2022-04-27 | End: 2022-05-27

## 2022-04-27 ASSESSMENT — ENCOUNTER SYMPTOMS
SHORTNESS OF BREATH: 0
DOUBLE VISION: 0
MYALGIAS: 0
COUGH: 0
SORE THROAT: 0
NAUSEA: 0
FEVER: 0
CHILLS: 0
DIZZINESS: 0
CONSTIPATION: 1
PALPITATIONS: 0
BLURRED VISION: 0
VOMITING: 0
HEADACHES: 0

## 2022-04-27 ASSESSMENT — FIBROSIS 4 INDEX: FIB4 SCORE: 0.71

## 2022-04-27 NOTE — ASSESSMENT & PLAN NOTE
Chronic   Cont miralax and hydration  Cont fiber and colace prn  Avoid dehydration  rtc or GI if concerns

## 2022-04-27 NOTE — PROGRESS NOTES
Subjective:     CC: er f/u     HPI:   Diego presents today with     1) constipation is improved with miralax  CT discussed  LFT elevated    Repeat in 1-2m  No EtOH use    2) Foot pain,  Using otc    3) anxiety, stable would like refill    Traveling on river raft trip next week     Problem   Elevated Liver Enzymes    New  F/u cmp repeat in 1-2 months  rtc if concerns     Other Constipation    Chronic stable  BM every 2 days  Pt to keep food diary  Pt to inc hydration and fiber or fresh veggies  F/u with GI if not resolved     Anxiety    Chronic. He is not getting a medical marijuana card, and won't be using marijuana. He wants a refill of xanax and finds 1/2 will go to sleep. He is not going to take a medication daily. He doesn't feel he has symptoms every day.          Current Outpatient Medications Ordered in Epic   Medication Sig Dispense Refill   • polyethylene glycol/lytes (MIRALAX) 17 g Pack Take 1 Packet by mouth every day for 7 days. 7 Each 3   • ALPRAZolam (XANAX) 0.5 MG Tab Take 1 Tablet by mouth at bedtime as needed for Anxiety for up to 30 days. 30 Tablet 0   • predniSONE (DELTASONE) 20 MG Tab Take 3 Tablets by mouth every day for 5 days. 15 Tablet 0   • tizanidine (ZANAFLEX) 2 MG capsule Take 1 Capsule by mouth every 8 hours as needed (Muscle spasm). 30 Capsule 0   • diphenhydrAMINE-APAP, sleep, (EXCEDRIN PM PO) Take 1 Tablet by mouth at bedtime as needed (For pain and sleep).     • docusate sodium (COLACE) 100 MG Cap Take 1 Capsule by mouth 2 times a day for 14 days. (Patient not taking: No sig reported) 28 Capsule 0     No current Epic-ordered facility-administered medications on file.     ROS:  Review of Systems   Constitutional: Negative for chills and fever.   HENT: Negative for ear pain and sore throat.    Eyes: Negative for blurred vision and double vision.   Respiratory: Negative for cough and shortness of breath.    Cardiovascular: Negative for chest pain and palpitations.   Gastrointestinal:  "Positive for constipation. Negative for nausea and vomiting.   Genitourinary: Negative for dysuria and hematuria.   Musculoskeletal: Negative for myalgias.   Neurological: Negative for dizziness and headaches.       Objective:     Exam:  /68 (BP Location: Left arm, Patient Position: Sitting, BP Cuff Size: Adult)   Pulse 64   Temp 36.3 °C (97.3 °F) (Temporal)   Resp 18   Ht 1.88 m (6' 2\")   Wt 96.6 kg (213 lb)   SpO2 96%   BMI 27.35 kg/m²  Body mass index is 27.35 kg/m².    Physical Exam  Constitutional:       General: He is not in acute distress.     Appearance: Normal appearance. He is not ill-appearing, toxic-appearing or diaphoretic.   HENT:      Head: Normocephalic and atraumatic.   Eyes:      General: No scleral icterus.        Right eye: No discharge.      Extraocular Movements: Extraocular movements intact.      Conjunctiva/sclera: Conjunctivae normal.      Pupils: Pupils are equal, round, and reactive to light.   Cardiovascular:      Pulses: Normal pulses.      Heart sounds: Normal heart sounds. No murmur heard.  Pulmonary:      Effort: Pulmonary effort is normal. No respiratory distress.      Breath sounds: Normal breath sounds.   Abdominal:      General: There is no distension.   Neurological:      Mental Status: He is alert.      Coordination: Coordination normal.      Gait: Gait normal.         Assessment & Plan:     41 y.o. male with the following -     Problem List Items Addressed This Visit     Anxiety     Chronic stable  Cont current  Refill and ADRs consent reviewed         Relevant Medications    ALPRAZolam (XANAX) 0.5 MG Tab    Other Relevant Orders    Controlled Substance Treatment Agreement    Other constipation     Chronic   Cont miralax and hydration  Cont fiber and colace prn  Avoid dehydration  rtc or GI if concerns         Relevant Medications    polyethylene glycol/lytes (MIRALAX) 17 g Pack    Elevated liver enzymes        Return in about 6 months (around 10/27/2022), or if " symptoms worsen or fail to improve, for f/u labs LFTs.    Please note that this dictation was created using voice recognition software. I have made every reasonable attempt to correct obvious errors, but I expect that there are errors of grammar and possibly content that I did not discover before finalizing the note.

## 2022-05-12 ENCOUNTER — OFFICE VISIT (OUTPATIENT)
Dept: DERMATOLOGY | Facility: IMAGING CENTER | Age: 42
End: 2022-05-12
Payer: COMMERCIAL

## 2022-05-12 DIAGNOSIS — L30.9 DERMATITIS: ICD-10-CM

## 2022-05-12 DIAGNOSIS — L81.4 LENTIGO: ICD-10-CM

## 2022-05-12 DIAGNOSIS — L73.9 FOLLICULITIS: ICD-10-CM

## 2022-05-12 PROCEDURE — 99203 OFFICE O/P NEW LOW 30 MIN: CPT | Performed by: DERMATOLOGY

## 2022-05-12 NOTE — PROGRESS NOTES
CC: establish care.  Many skin concerns.     Subjective:new patient here for several skin concerns:  -occ redness/burning>itching on face  -itchy rash on body after camping. Thought maybe it was a spider bite. Denies scabies exposures known and limited area of skin affected. Comes/goes.  -skin tags, axilla  -desires exam of spots on back.     ROS: no fevers/chills. No itch.  No cough  DermPMH: no skin cancer/melanoma  No problem-specific Assessment & Plan notes found for this encounter.    Relevant PMH: NC  Social:NS; works electrical    PE: Gen:WDWN male in NAD.  Skin: focal exam: face - without notable rashes. Mild midface redness noted.  Chest/back/shoulders - scattered acneiform papules, and scattered hyperpigmented macules/few papules appearing benign.  Skin tags under right axilla - benign in appearance.  Few clustered papules on dorsal left hand, appearing eczematous. No interdigit papules.     A/P: consider ACD vs rosacea face: largely clear today  -reviewed dx/tx  -watch for culprit products; reviewed test spot to try to help identify suspect product.  If mult products - consider allergy referral for patch testing  -avoid triggers  -sunprotect SPF 50  -consider future metrocream trial and RTC PRN worsening    Folliculitis, body:  -reviewed hibiclens as body wash 2-3 X/week in shower    Eczema/eczematous dermatitis, hands, likely  -reviewed dx/tx  -moisturizer handout supplied  -f/u PRN    Lentigos, nevi, back: benign in appearance:  -sunprotection reviewed    F/u PRN    I have reviewed medications relevant to my specialty.

## 2022-06-20 ENCOUNTER — OFFICE VISIT (OUTPATIENT)
Dept: URGENT CARE | Facility: CLINIC | Age: 42
End: 2022-06-20
Payer: COMMERCIAL

## 2022-06-20 ENCOUNTER — APPOINTMENT (OUTPATIENT)
Dept: RADIOLOGY | Facility: MEDICAL CENTER | Age: 42
End: 2022-06-20
Attending: NURSE PRACTITIONER
Payer: COMMERCIAL

## 2022-06-20 VITALS
HEART RATE: 60 BPM | OXYGEN SATURATION: 97 % | RESPIRATION RATE: 16 BRPM | WEIGHT: 217 LBS | HEIGHT: 73 IN | DIASTOLIC BLOOD PRESSURE: 76 MMHG | SYSTOLIC BLOOD PRESSURE: 118 MMHG | BODY MASS INDEX: 28.76 KG/M2 | TEMPERATURE: 98.1 F

## 2022-06-20 DIAGNOSIS — R10.31 RLQ ABDOMINAL PAIN: ICD-10-CM

## 2022-06-20 PROCEDURE — 76705 ECHO EXAM OF ABDOMEN: CPT

## 2022-06-20 PROCEDURE — 99214 OFFICE O/P EST MOD 30 MIN: CPT | Performed by: NURSE PRACTITIONER

## 2022-06-20 PROCEDURE — 700117 HCHG RX CONTRAST REV CODE 255: Performed by: NURSE PRACTITIONER

## 2022-06-20 PROCEDURE — 74177 CT ABD & PELVIS W/CONTRAST: CPT

## 2022-06-20 RX ORDER — OMEPRAZOLE 20 MG/1
20 CAPSULE, DELAYED RELEASE ORAL DAILY
COMMUNITY

## 2022-06-20 RX ADMIN — IOHEXOL 100 ML: 350 INJECTION, SOLUTION INTRAVENOUS at 17:03

## 2022-06-20 ASSESSMENT — ENCOUNTER SYMPTOMS
FLATUS: 1
CHILLS: 0
FEVER: 0
ABDOMINAL PAIN: 1
VOMITING: 0
MYALGIAS: 0
CONSTIPATION: 0
DIARRHEA: 0

## 2022-06-20 ASSESSMENT — FIBROSIS 4 INDEX: FIB4 SCORE: 0.73

## 2022-06-20 NOTE — PROGRESS NOTES
Subjective:     Diego Abarca is a 42 y.o. male who presents for Abdominal Pain (X 2-3 days having tender skin pain along R front to back, some abdominal pain with bending)      Abdominal Pain  This is a new problem. The current episode started in the past 7 days (Checo is a pleasant 42 year old male who presents to  today with complaints of lower abdominal pain for the past 2-3 days. He states his pain is constant. His pain is worse with palpation and movement. ). The problem occurs constantly. The pain is located in the RLQ and periumbilical region. The pain is at a severity of 6/10. The quality of the pain is burning and sharp. The abdominal pain radiates to the right flank. Associated symptoms include flatus. Pertinent negatives include no constipation, diarrhea, fever, frequency, myalgias or vomiting. Associated symptoms comments: He does complain of a painful itchy rash to his abdomen that started yesterday. The pain is relieved by nothing. He has tried nothing for the symptoms. There is no history of abdominal surgery.         Review of Systems   Constitutional: Negative for chills, fever and malaise/fatigue.   Gastrointestinal: Positive for abdominal pain and flatus. Negative for constipation, diarrhea and vomiting.   Genitourinary: Negative for frequency.   Musculoskeletal: Negative for myalgias.       PMH:   Past Medical History:   Diagnosis Date   • Alpha 1-antitrypsin PiMS phenotype    • Asthma    • Chickenpox    • Constipation    • French measles    • Irritable bowel syndrome (IBS)    • Mumps      ALLERGIES:   Allergies   Allergen Reactions   • Azithromycin Vomiting and Nausea     SURGHX:   Past Surgical History:   Procedure Laterality Date   • ARTHROPLASTY  01/20/2021    RT ankle    • OTHER ORTHOPEDIC SURGERY Left 2002    knee     SOCHX:   Social History     Socioeconomic History   • Marital status:    Tobacco Use   • Smoking status: Never Smoker   • Smokeless tobacco: Never Used   Vaping Use  "  • Vaping Use: Never used   Substance and Sexual Activity   • Alcohol use: Not Currently     Comment: quit drinking Sept 3, 2021   • Drug use: Not Currently     Types: Marijuana     Comment: quit smoking marijuana Dec 10, 2021   • Sexual activity: Yes     Partners: Female     Comment: wife     FH:   Family History   Problem Relation Age of Onset   • No Known Problems Mother    • Diabetes Maternal Grandfather    • Heart Disease Maternal Grandfather    • Stroke Other    • Cancer Child         leukemia type B   • Drug abuse Neg Hx    • Alcohol abuse Neg Hx          Objective:   /76 (BP Location: Left arm, Patient Position: Sitting, BP Cuff Size: Large adult)   Pulse 60   Temp 36.7 °C (98.1 °F) (Temporal)   Resp 16   Ht 1.854 m (6' 1\")   Wt 98.4 kg (217 lb)   SpO2 97%   BMI 28.63 kg/m²     Physical Exam  Vitals and nursing note reviewed.   Constitutional:       General: He is not in acute distress.     Appearance: Normal appearance. He is not ill-appearing.   HENT:      Head: Normocephalic and atraumatic.      Right Ear: External ear normal.      Left Ear: External ear normal.      Nose: No congestion or rhinorrhea.      Mouth/Throat:      Mouth: Mucous membranes are moist.   Eyes:      Extraocular Movements: Extraocular movements intact.      Pupils: Pupils are equal, round, and reactive to light.   Cardiovascular:      Rate and Rhythm: Normal rate and regular rhythm.      Pulses: Normal pulses.      Heart sounds: Normal heart sounds.   Pulmonary:      Effort: Pulmonary effort is normal.      Breath sounds: Normal breath sounds.   Abdominal:      General: Abdomen is flat. Bowel sounds are normal.      Palpations: Abdomen is soft.      Tenderness: There is abdominal tenderness. There is guarding. There is no right CVA tenderness or left CVA tenderness. Positive signs include McBurney's sign.          Comments: Erythemic pinpoint lesions present to abdomen consistent with folliculitis versus bug bite.  " Patient encouraged to use Polysporin topically for treatment.   Musculoskeletal:         General: Normal range of motion.      Cervical back: Normal range of motion and neck supple.   Skin:     General: Skin is warm and dry.      Capillary Refill: Capillary refill takes less than 2 seconds.   Neurological:      General: No focal deficit present.      Mental Status: He is alert and oriented to person, place, and time. Mental status is at baseline.   Psychiatric:         Mood and Affect: Mood normal.         Behavior: Behavior normal.         Thought Content: Thought content normal.         Judgment: Judgment normal.         Assessment/Plan:   Assessment    1. RLQ abdominal pain  US-APPENDIX     I am concerned for appendicitis at this time.  Ultrasound ordered stat for evaluation of abdominal pain.  Will consider CT scan if ultrasound is unremarkable.  AVS handout given and reviewed with patient. Pt educated on red flags and when to seek treatment back in ER or UC.

## 2022-07-19 ENCOUNTER — OFFICE VISIT (OUTPATIENT)
Dept: MEDICAL GROUP | Facility: PHYSICIAN GROUP | Age: 42
End: 2022-07-19
Payer: COMMERCIAL

## 2022-07-19 VITALS
WEIGHT: 208 LBS | OXYGEN SATURATION: 96 % | BODY MASS INDEX: 27.57 KG/M2 | SYSTOLIC BLOOD PRESSURE: 100 MMHG | HEIGHT: 73 IN | TEMPERATURE: 97.9 F | HEART RATE: 56 BPM | DIASTOLIC BLOOD PRESSURE: 66 MMHG

## 2022-07-19 DIAGNOSIS — R74.8 ELEVATED LIVER ENZYMES: ICD-10-CM

## 2022-07-19 DIAGNOSIS — R10.9 ABDOMINAL PAIN, UNSPECIFIED ABDOMINAL LOCATION: ICD-10-CM

## 2022-07-19 DIAGNOSIS — N20.0 RIGHT KIDNEY STONE: ICD-10-CM

## 2022-07-19 PROCEDURE — 99213 OFFICE O/P EST LOW 20 MIN: CPT | Performed by: FAMILY MEDICINE

## 2022-07-19 ASSESSMENT — ENCOUNTER SYMPTOMS
BLURRED VISION: 0
NAUSEA: 0
DOUBLE VISION: 0
COUGH: 0
CHILLS: 0
PALPITATIONS: 0
MYALGIAS: 0
DIZZINESS: 0
HEADACHES: 0
ABDOMINAL PAIN: 1
VOMITING: 0
SORE THROAT: 0
SHORTNESS OF BREATH: 0
FEVER: 0

## 2022-07-19 ASSESSMENT — FIBROSIS 4 INDEX: FIB4 SCORE: 0.73

## 2022-07-19 NOTE — PROGRESS NOTES
Subjective:     CC: resolved abd pain     HPI:   Diego presents today with     Reports feeling unwell last week  Was sweaty and had LLQ and L flank pain  With blood in urine    Had a R sided non obstructing stone on CT in June    No Fever  No CP no SOB  No dizziness      Problem   Elevated Liver Enzymes    New  F/u cmp repeat in 1-2 months  rtc if concerns     Abdominal Pain    Chronic.  He has had lower abdominal pain for years.  Initially only at night and was waking up but now occurring during the day sometimes.  He has had an increase of the pain over the last couple of weeks in the left lower abdomen.  Previously treated him with omeprazole and he did notice a big difference in his symptoms.   Describes LLQ pain as 'uncomfortable & hot', feels pain right before he stools; denies constipation, diarrhea, bloody stools. No fever/chills. Occasional epigastric pain. Denies ETOH.          Current Outpatient Medications Ordered in Epic   Medication Sig Dispense Refill   • omeprazole (PRILOSEC) 20 MG delayed-release capsule Take 20 mg by mouth every day.     • tizanidine (ZANAFLEX) 2 MG capsule Take 1 Capsule by mouth every 8 hours as needed (Muscle spasm). (Patient not taking: No sig reported) 30 Capsule 0     No current Epic-ordered facility-administered medications on file.     ROS:  Review of Systems   Constitutional: Negative for chills and fever.   HENT: Negative for ear pain and sore throat.    Eyes: Negative for blurred vision and double vision.   Respiratory: Negative for cough and shortness of breath.    Cardiovascular: Negative for chest pain and palpitations.   Gastrointestinal: Positive for abdominal pain. Negative for nausea and vomiting.   Genitourinary: Positive for dysuria and hematuria.   Musculoskeletal: Negative for myalgias.   Neurological: Negative for dizziness and headaches.       Objective:     Exam:  /66 (BP Location: Left arm, Patient Position: Sitting, BP Cuff Size: Adult)   Pulse  "(!) 56   Temp 36.6 °C (97.9 °F) (Temporal)   Ht 1.854 m (6' 1\")   Wt 94.3 kg (208 lb)   SpO2 96%   BMI 27.44 kg/m²  Body mass index is 27.44 kg/m².    Physical Exam  Constitutional:       General: He is not in acute distress.     Appearance: Normal appearance. He is not ill-appearing, toxic-appearing or diaphoretic.   HENT:      Head: Normocephalic and atraumatic.   Eyes:      General: No scleral icterus.        Right eye: No discharge.         Left eye: No discharge.      Extraocular Movements: Extraocular movements intact.      Conjunctiva/sclera: Conjunctivae normal.      Pupils: Pupils are equal, round, and reactive to light.   Cardiovascular:      Rate and Rhythm: Normal rate and regular rhythm.      Pulses: Normal pulses.      Heart sounds: Normal heart sounds. No murmur heard.    No friction rub.   Pulmonary:      Effort: Pulmonary effort is normal. No respiratory distress.      Breath sounds: Normal breath sounds.   Abdominal:      General: Abdomen is flat. There is no distension.      Tenderness: There is no abdominal tenderness. There is no right CVA tenderness, left CVA tenderness, guarding or rebound.      Hernia: No hernia is present.   Musculoskeletal:      Cervical back: Normal range of motion and neck supple. No rigidity or tenderness.      Right lower leg: No edema.      Left lower leg: No edema.   Lymphadenopathy:      Cervical: No cervical adenopathy.   Skin:     Coloration: Skin is not jaundiced.   Neurological:      Mental Status: He is alert.      Coordination: Coordination normal.      Gait: Gait normal.       Comfortable, looking at skate board video on phone    Assessment & Plan:     42 y.o. male with the following -     Problem List Items Addressed This Visit     Abdominal pain     Chronic  Unknown etiology  CT 4/2022 and CT 6/2022   wnl except R non obstructing stone    F/u US  F/u labs  hydrate     ER if concerns           Relevant Orders    URINALYSIS    URINE CULTURE(NEW)    " US-RENAL    CBC WITHOUT DIFFERENTIAL    Comp Metabolic Panel    Elevated liver enzymes     F/u repeat             Other Visit Diagnoses     Right kidney stone        Relevant Orders    URINALYSIS    US-RENAL        Return if symptoms worsen or fail to improve, for f/u labs.    Please note that this dictation was created using voice recognition software. I have made every reasonable attempt to correct obvious errors, but I expect that there are errors of grammar and possibly content that I did not discover before finalizing the note.

## 2022-07-19 NOTE — ASSESSMENT & PLAN NOTE
Chronic  Unknown etiology  CT 4/2022 and CT 6/2022   wnl except R non obstructing stone    F/u US  F/u labs  hydrate     ER if concerns

## 2022-07-22 ENCOUNTER — HOSPITAL ENCOUNTER (OUTPATIENT)
Dept: RADIOLOGY | Facility: MEDICAL CENTER | Age: 42
End: 2022-07-22
Attending: FAMILY MEDICINE
Payer: COMMERCIAL

## 2022-07-22 ENCOUNTER — HOSPITAL ENCOUNTER (OUTPATIENT)
Dept: LAB | Facility: MEDICAL CENTER | Age: 42
End: 2022-07-22
Attending: FAMILY MEDICINE
Payer: COMMERCIAL

## 2022-07-22 DIAGNOSIS — N20.0 RIGHT KIDNEY STONE: ICD-10-CM

## 2022-07-22 DIAGNOSIS — R10.9 ABDOMINAL PAIN, UNSPECIFIED ABDOMINAL LOCATION: ICD-10-CM

## 2022-07-22 LAB
ALBUMIN SERPL BCP-MCNC: 4.2 G/DL (ref 3.2–4.9)
ALBUMIN/GLOB SERPL: 1.6 G/DL
ALP SERPL-CCNC: 81 U/L (ref 30–99)
ALT SERPL-CCNC: 31 U/L (ref 2–50)
AMORPH CRY #/AREA URNS HPF: PRESENT /HPF
ANION GAP SERPL CALC-SCNC: 10 MMOL/L (ref 7–16)
APPEARANCE UR: ABNORMAL
AST SERPL-CCNC: 23 U/L (ref 12–45)
BACTERIA #/AREA URNS HPF: NEGATIVE /HPF
BILIRUB SERPL-MCNC: 0.7 MG/DL (ref 0.1–1.5)
BILIRUB UR QL STRIP.AUTO: NEGATIVE
BUN SERPL-MCNC: 12 MG/DL (ref 8–22)
CALCIUM SERPL-MCNC: 9.2 MG/DL (ref 8.5–10.5)
CHLORIDE SERPL-SCNC: 101 MMOL/L (ref 96–112)
CO2 SERPL-SCNC: 25 MMOL/L (ref 20–33)
COLOR UR: YELLOW
CREAT SERPL-MCNC: 0.96 MG/DL (ref 0.5–1.4)
EPI CELLS #/AREA URNS HPF: ABNORMAL /HPF
ERYTHROCYTE [DISTWIDTH] IN BLOOD BY AUTOMATED COUNT: 40.7 FL (ref 35.9–50)
GFR SERPLBLD CREATININE-BSD FMLA CKD-EPI: 102 ML/MIN/1.73 M 2
GLOBULIN SER CALC-MCNC: 2.6 G/DL (ref 1.9–3.5)
GLUCOSE SERPL-MCNC: 110 MG/DL (ref 65–99)
GLUCOSE UR STRIP.AUTO-MCNC: NEGATIVE MG/DL
HCT VFR BLD AUTO: 46.3 % (ref 42–52)
HGB BLD-MCNC: 15.7 G/DL (ref 14–18)
HYALINE CASTS #/AREA URNS LPF: ABNORMAL /LPF
KETONES UR STRIP.AUTO-MCNC: NEGATIVE MG/DL
LEUKOCYTE ESTERASE UR QL STRIP.AUTO: NEGATIVE
MCH RBC QN AUTO: 28.9 PG (ref 27–33)
MCHC RBC AUTO-ENTMCNC: 33.9 G/DL (ref 33.7–35.3)
MCV RBC AUTO: 85.3 FL (ref 81.4–97.8)
MICRO URNS: ABNORMAL
NITRITE UR QL STRIP.AUTO: NEGATIVE
PH UR STRIP.AUTO: 6 [PH] (ref 5–8)
PLATELET # BLD AUTO: 249 K/UL (ref 164–446)
PMV BLD AUTO: 9.6 FL (ref 9–12.9)
POTASSIUM SERPL-SCNC: 4 MMOL/L (ref 3.6–5.5)
PROT SERPL-MCNC: 6.8 G/DL (ref 6–8.2)
PROT UR QL STRIP: NEGATIVE MG/DL
RBC # BLD AUTO: 5.43 M/UL (ref 4.7–6.1)
RBC # URNS HPF: ABNORMAL /HPF
RBC UR QL AUTO: NEGATIVE
SODIUM SERPL-SCNC: 136 MMOL/L (ref 135–145)
SP GR UR STRIP.AUTO: 1.02
UROBILINOGEN UR STRIP.AUTO-MCNC: 0.2 MG/DL
WBC # BLD AUTO: 5.1 K/UL (ref 4.8–10.8)
WBC #/AREA URNS HPF: ABNORMAL /HPF

## 2022-07-22 PROCEDURE — 76775 US EXAM ABDO BACK WALL LIM: CPT

## 2022-07-22 PROCEDURE — 85027 COMPLETE CBC AUTOMATED: CPT

## 2022-07-22 PROCEDURE — 81001 URINALYSIS AUTO W/SCOPE: CPT

## 2022-07-22 PROCEDURE — 87086 URINE CULTURE/COLONY COUNT: CPT

## 2022-07-22 PROCEDURE — 80053 COMPREHEN METABOLIC PANEL: CPT

## 2022-07-22 PROCEDURE — 36415 COLL VENOUS BLD VENIPUNCTURE: CPT

## 2022-07-25 LAB
BACTERIA UR CULT: NORMAL
SIGNIFICANT IND 70042: NORMAL
SITE SITE: NORMAL
SOURCE SOURCE: NORMAL

## 2022-07-28 ENCOUNTER — OFFICE VISIT (OUTPATIENT)
Dept: MEDICAL GROUP | Facility: PHYSICIAN GROUP | Age: 42
End: 2022-07-28
Payer: COMMERCIAL

## 2022-07-28 VITALS
OXYGEN SATURATION: 97 % | HEART RATE: 60 BPM | RESPIRATION RATE: 12 BRPM | TEMPERATURE: 98.4 F | DIASTOLIC BLOOD PRESSURE: 64 MMHG | BODY MASS INDEX: 27.57 KG/M2 | SYSTOLIC BLOOD PRESSURE: 118 MMHG | WEIGHT: 208 LBS | HEIGHT: 73 IN

## 2022-07-28 DIAGNOSIS — R10.9 ABDOMINAL PAIN, UNSPECIFIED ABDOMINAL LOCATION: ICD-10-CM

## 2022-07-28 DIAGNOSIS — F41.9 ANXIETY: ICD-10-CM

## 2022-07-28 DIAGNOSIS — M54.6 CHRONIC MIDLINE THORACIC BACK PAIN: ICD-10-CM

## 2022-07-28 DIAGNOSIS — G89.29 CHRONIC MIDLINE THORACIC BACK PAIN: ICD-10-CM

## 2022-07-28 DIAGNOSIS — N20.0 NEPHROLITHIASIS: ICD-10-CM

## 2022-07-28 DIAGNOSIS — R07.89 COSTOCHONDRAL CHEST PAIN: ICD-10-CM

## 2022-07-28 DIAGNOSIS — R21 RASH: ICD-10-CM

## 2022-07-28 PROCEDURE — 99213 OFFICE O/P EST LOW 20 MIN: CPT | Performed by: FAMILY MEDICINE

## 2022-07-28 RX ORDER — TAMSULOSIN HYDROCHLORIDE 0.4 MG/1
0.4 CAPSULE ORAL DAILY
Qty: 2 CAPSULE | Refills: 0 | Status: SHIPPED | OUTPATIENT
Start: 2022-07-28 | End: 2022-10-25

## 2022-07-28 RX ORDER — ALPRAZOLAM 0.5 MG/1
0.5 TABLET ORAL NIGHTLY PRN
Qty: 30 TABLET | Refills: 0 | Status: SHIPPED | OUTPATIENT
Start: 2022-07-28 | End: 2022-08-27

## 2022-07-28 RX ORDER — TAMSULOSIN HYDROCHLORIDE 0.4 MG/1
0.4 CAPSULE ORAL DAILY
Qty: 2 CAPSULE | Refills: 0 | Status: SHIPPED | OUTPATIENT
Start: 2022-07-28 | End: 2022-07-28

## 2022-07-28 ASSESSMENT — ENCOUNTER SYMPTOMS
PALPITATIONS: 0
DOUBLE VISION: 0
CHILLS: 0
DIZZINESS: 0
COUGH: 0
FEVER: 0
BLURRED VISION: 0
HEADACHES: 0
VOMITING: 0
MYALGIAS: 0
NAUSEA: 0
SORE THROAT: 0
SHORTNESS OF BREATH: 0

## 2022-07-28 ASSESSMENT — FIBROSIS 4 INDEX: FIB4 SCORE: 0.68

## 2022-07-28 NOTE — PROGRESS NOTES
"Subjective:     CC: check up    HPI:   Diego presents today with     1) resolving abd and flank pain  US renal bilat stones <4mm    2) hx of costochondritis cp, none current    3) anxiety, would like med refill   No taking every day     4) rash on face, comes and goes  Did not have it when seen by derm  Uses otc steroid  Aware of risks    No problems updated.    Current Outpatient Medications Ordered in Epic   Medication Sig Dispense Refill   • tamsulosin (FLOMAX) 0.4 MG capsule Take 1 Capsule by mouth every day. 2 Capsule 0   • ALPRAZolam (XANAX) 0.5 MG Tab Take 1 Tablet by mouth at bedtime as needed for Sleep for up to 30 days. 30 Tablet 0   • omeprazole (PRILOSEC) 20 MG delayed-release capsule Take 20 mg by mouth every day.     • tizanidine (ZANAFLEX) 2 MG capsule Take 1 Capsule by mouth every 8 hours as needed (Muscle spasm). 30 Capsule 0     No current Epic-ordered facility-administered medications on file.     ROS:  Review of Systems   Constitutional: Negative for chills and fever.   HENT: Negative for ear pain and sore throat.    Eyes: Negative for blurred vision and double vision.   Respiratory: Negative for cough and shortness of breath.    Cardiovascular: Negative for chest pain and palpitations.   Gastrointestinal: Negative for nausea and vomiting.   Genitourinary: Negative for dysuria and hematuria.   Musculoskeletal: Negative for myalgias.   Neurological: Negative for dizziness and headaches.       Objective:     Exam:  /64 (BP Location: Left arm, Patient Position: Sitting, BP Cuff Size: Adult)   Pulse 60   Temp 36.9 °C (98.4 °F) (Temporal)   Resp 12   Ht 1.854 m (6' 1\")   Wt 94.3 kg (208 lb)   SpO2 97%   BMI 27.44 kg/m²  Body mass index is 27.44 kg/m².    Physical Exam  Constitutional:       General: He is not in acute distress.     Appearance: Normal appearance. He is not ill-appearing, toxic-appearing or diaphoretic.   HENT:      Head: Normocephalic and atraumatic.   Eyes:      " General: No scleral icterus.        Right eye: No discharge.         Left eye: No discharge.      Extraocular Movements: Extraocular movements intact.      Conjunctiva/sclera: Conjunctivae normal.      Pupils: Pupils are equal, round, and reactive to light.   Pulmonary:      Effort: No respiratory distress.   Abdominal:      General: There is no distension.      Tenderness: There is no abdominal tenderness. There is no right CVA tenderness, left CVA tenderness or guarding.   Neurological:      Mental Status: He is alert.      Coordination: Coordination normal.      Gait: Gait normal.         Assessment & Plan:     42 y.o. male with the following -     Problem List Items Addressed This Visit     Abdominal pain    Relevant Orders    EKG - Clinic Performed    Thoracic back pain    Relevant Orders    EKG - Clinic Performed    Costochondral chest pain    Relevant Orders    EKG - Clinic Performed    Anxiety    Relevant Medications    ALPRAZolam (XANAX) 0.5 MG Tab      Other Visit Diagnoses     Nephrolithiasis        Relevant Medications    tamsulosin (FLOMAX) 0.4 MG capsule    Other Relevant Orders    Referral to Urology          No follow-ups on file.    Please note that this dictation was created using voice recognition software. I have made every reasonable attempt to correct obvious errors, but I expect that there are errors of grammar and possibly content that I did not discover before finalizing the note.

## 2022-07-29 ENCOUNTER — OFFICE VISIT (OUTPATIENT)
Dept: URGENT CARE | Facility: PHYSICIAN GROUP | Age: 42
End: 2022-07-29
Payer: COMMERCIAL

## 2022-07-29 VITALS
HEIGHT: 73 IN | RESPIRATION RATE: 18 BRPM | SYSTOLIC BLOOD PRESSURE: 122 MMHG | WEIGHT: 208 LBS | TEMPERATURE: 97.9 F | HEART RATE: 60 BPM | DIASTOLIC BLOOD PRESSURE: 78 MMHG | OXYGEN SATURATION: 99 % | BODY MASS INDEX: 27.57 KG/M2

## 2022-07-29 DIAGNOSIS — L08.9 PUSTULE: ICD-10-CM

## 2022-07-29 DIAGNOSIS — S05.8X2A EYE ABRASION, LEFT, INITIAL ENCOUNTER: ICD-10-CM

## 2022-07-29 PROCEDURE — 99214 OFFICE O/P EST MOD 30 MIN: CPT | Performed by: STUDENT IN AN ORGANIZED HEALTH CARE EDUCATION/TRAINING PROGRAM

## 2022-07-29 RX ORDER — CLINDAMYCIN PHOSPHATE 10 MG/G
GEL TOPICAL
Qty: 75 ML | Refills: 0 | Status: SHIPPED | OUTPATIENT
Start: 2022-07-29 | End: 2022-10-25

## 2022-07-29 RX ORDER — POLYMYXIN B SULFATE AND TRIMETHOPRIM 1; 10000 MG/ML; [USP'U]/ML
1 SOLUTION OPHTHALMIC EVERY 4 HOURS
Qty: 4 ML | Refills: 0 | Status: SHIPPED | OUTPATIENT
Start: 2022-07-29 | End: 2022-08-03

## 2022-07-29 ASSESSMENT — FIBROSIS 4 INDEX: FIB4 SCORE: 0.68

## 2022-07-29 ASSESSMENT — VISUAL ACUITY: OU: 1

## 2022-07-29 NOTE — PROGRESS NOTES
"Subjective     Checo Abarca is a 42 y.o. male who presents with Eye Problem (Per patient has left eye lid pain and it was crusty this morning. He also has like a pimple on his left back. )            Patient presents today for eye redness/discharge. He states he has the sensation that something is in his eye. He believed he may have got something in his eye a few days ago at work because thats when symptoms began. He also notes a \"pimple\"/\"cyst\" on his lower back that is red and tender. He has not noticed any warmth or swelling to the area.       Review of Systems   Constitutional: Negative for chills and fever.   HENT: Negative for congestion and sore throat.    Eyes: Positive for pain, discharge and redness. Negative for blurred vision, double vision and photophobia.   Respiratory: Negative for cough, shortness of breath and wheezing.    Cardiovascular: Negative for chest pain and palpitations.   Gastrointestinal: Negative.    Genitourinary: Negative.    Skin:        Positive for lower back \"pimple\"/\"cyst\"   Neurological: Negative for dizziness and headaches.              Objective     /78   Pulse 60   Temp 36.6 °C (97.9 °F) (Temporal)   Resp 18   Ht 1.854 m (6' 1\")   Wt 94.3 kg (208 lb)   SpO2 99%   BMI 27.44 kg/m²      Physical Exam  Vitals reviewed.   Constitutional:       Appearance: Normal appearance.   Eyes:      General: Lids are normal. Lids are everted, no foreign bodies appreciated. Vision grossly intact. Gaze aligned appropriately.         Right eye: No foreign body, discharge or hordeolum.         Left eye: No foreign body, discharge or hordeolum.      Extraocular Movements: Extraocular movements intact.      Conjunctiva/sclera:      Right eye: Right conjunctiva is not injected. No chemosis, exudate or hemorrhage.     Left eye: Left conjunctiva is injected. No chemosis, exudate or hemorrhage.     Pupils: Pupils are equal, round, and reactive to light.   Cardiovascular:      Rate and " Rhythm: Normal rate and regular rhythm.      Heart sounds: Normal heart sounds.   Pulmonary:      Effort: Pulmonary effort is normal.      Breath sounds: Normal breath sounds.   Skin:         Neurological:      Mental Status: He is alert and oriented to person, place, and time.                             Assessment & Plan        1. Eye abrasion, left, initial encounter  - polymixin-trimethoprim (POLYTRIM) 66053-5.1 UNIT/ML-% Solution; Administer 1 Drop into the left eye every 4 hours for 5 days.  Dispense: 4 mL; Refill: 0  - Referral to Ophthalmology: f/u may not be necessary but referral placed in case no improvement after 5 days of abx treatment.    Patient educated that most small abrasions (less than one-fourth of corneal surface area) will heal overnight/within 24 to 48 hours if the lid is closed and there is no rubbing or squeezing. Patient advised to keep the eye closed, avoid rubbing/itching eye and limit screen time for next 24 to 48 hours, as well as apply normal saline eyedrops as needed for comfort.  Patient prescribed erythromycin ointment educated to apply 4 times a day for the next 5 days.    Mild to moderate pain can typically be controlled with oral nonsteroidal anti-inflammatory drugs (NSAIDs/ibuprofen)    Follow-up may not be necessary as long as symptoms resolve. Patient instructed to return if eye drainage or decreased vision occurs or if symptoms persist beyond 48 hours.     2. Pustule  - clindamycin (CLEOCIN T) 1 % Gel; Topical: Apply to affected area twice daily.  Dispense: 75 mL; Refill: 0    Patient presents today with erythematous pustule located on right lower back.  He states that it is painful and tenderness to palpation. DDX includes cystic acne and hidradenitis suppurativa of back.  Patient educated on supportive measures and prescribed topical clindamycin to apply 2 times a day.  Patient advised that incision and drainage is not indicated.  Patient can apply warm compresses and  advised to keep clean/dry.  Can take over-the-counter Tylenol or ibuprofen for pain.    I personally reviewed prior external notes and test results pertinent to today's visit.    Differential diagnoses, supportive care, and indications for immediate follow-up discussed with patient. Pathogenesis of diagnosis discussed including typical length and natural progression.      Instructed to return to urgent care or nearest emergency department if symptoms fail to improve, for any change in condition, further concerns, or new concerning symptoms.    Patient states understanding and agrees with the plan of care and discharge instructions.

## 2022-07-31 ASSESSMENT — ENCOUNTER SYMPTOMS
EYE REDNESS: 1
PALPITATIONS: 0
FEVER: 0
WHEEZING: 0
COUGH: 0
DIZZINESS: 0
EYE PAIN: 1
GASTROINTESTINAL NEGATIVE: 1
SHORTNESS OF BREATH: 0
BLURRED VISION: 0
PHOTOPHOBIA: 0
EYE DISCHARGE: 1
DOUBLE VISION: 0
CHILLS: 0
SORE THROAT: 0
HEADACHES: 0

## 2022-08-11 ENCOUNTER — OFFICE VISIT (OUTPATIENT)
Dept: URGENT CARE | Facility: CLINIC | Age: 42
End: 2022-08-11
Payer: COMMERCIAL

## 2022-08-11 VITALS
OXYGEN SATURATION: 94 % | DIASTOLIC BLOOD PRESSURE: 66 MMHG | RESPIRATION RATE: 18 BRPM | TEMPERATURE: 98.5 F | SYSTOLIC BLOOD PRESSURE: 122 MMHG | WEIGHT: 211 LBS | HEIGHT: 73 IN | HEART RATE: 66 BPM | BODY MASS INDEX: 27.96 KG/M2

## 2022-08-11 DIAGNOSIS — L73.9 FOLLICULITIS: ICD-10-CM

## 2022-08-11 PROCEDURE — 99213 OFFICE O/P EST LOW 20 MIN: CPT | Performed by: PHYSICIAN ASSISTANT

## 2022-08-11 RX ORDER — DOXYCYCLINE HYCLATE 100 MG
100 TABLET ORAL 2 TIMES DAILY
Qty: 10 TABLET | Refills: 0 | Status: SHIPPED | OUTPATIENT
Start: 2022-08-11 | End: 2022-08-16

## 2022-08-11 ASSESSMENT — FIBROSIS 4 INDEX: FIB4 SCORE: 0.68

## 2022-08-11 NOTE — PROGRESS NOTES
"Subjective:   Diego Abarca is a 42 y.o. male who presents for Rash (Has a cut from the river last Sunday. Red bumps on right shin area.)      HPI  The patient presents to the Urgent Care with complaints of a rash and red bumps on his right shin.  He was on the river 4 days ago and scratched his right lower leg superficially on some rocks.  Over the last couple days he noticed several red bumps and pustules that became more red, swollen, and tender.  There has been no drainage.  No fevers or chills.        Medications:    ALPRAZolam Tabs  clindamycin Gel  omeprazole  tamsulosin  tizanidine    Allergies: Azithromycin    Problem List: Diego Abarca does not have any pertinent problems on file.    Surgical History:  Past Surgical History:   Procedure Laterality Date    ARTHROPLASTY  01/20/2021    RT ankle     OTHER ORTHOPEDIC SURGERY Left 2002    knee       Past Social Hx: Diego Abarca  reports that he has never smoked. He has never used smokeless tobacco. He reports that he does not currently use alcohol. He reports that he does not currently use drugs after having used the following drugs: Marijuana.     Past Family Hx:  Diego Abarca family history includes Cancer in his child; Diabetes in his maternal grandfather; Heart Disease in his maternal grandfather; No Known Problems in his mother; Stroke in an other family member.     Problem list, medications, and allergies reviewed by myself today in Epic.     Objective:     /66   Pulse 66   Temp 36.9 °C (98.5 °F) (Temporal)   Resp 18   Ht 1.854 m (6' 1\")   Wt 95.7 kg (211 lb)   SpO2 94%   BMI 27.84 kg/m²     Physical Exam  Vitals reviewed.   Constitutional:       General: He is not in acute distress.     Appearance: Normal appearance. He is not ill-appearing or toxic-appearing.   Eyes:      Conjunctiva/sclera: Conjunctivae normal.      Pupils: Pupils are equal, round, and reactive to light.   Cardiovascular:      Rate and Rhythm: " Normal rate.   Pulmonary:      Effort: Pulmonary effort is normal.   Musculoskeletal:      Cervical back: Neck supple.   Skin:     General: Skin is warm and dry.      Comments: Right anterior lower leg there is healing scabbed over superficial linear abrasions.  There are approximately #5 small round pustules to hair follicles directly over the abrasions. Trace erythema. Mildly tender. No fluctuance abscess. No streaking. No induration or surrounding edema.    Neurological:      General: No focal deficit present.      Mental Status: He is alert and oriented to person, place, and time.   Psychiatric:         Mood and Affect: Mood normal.         Behavior: Behavior normal.       Diagnosis and associated orders:     1. Folliculitis  - doxycycline (VIBRAMYCIN) 100 MG Tab; Take 1 Tablet by mouth 2 times a day for 5 days.  Dispense: 10 Tablet; Refill: 0     Comments/MDM:     Presenting symptoms and exam findings consistent with infected folliculitis most likely possible staph infection.  Patient will be started on doxycycline.  Wound care discussed.  Discussed possible rupture of the pustules and encouraged warm compresses with clean hands and clean rag.  He may apply Neosporin or Aquaphor for skin protection.  Keep area covered.  Tylenol or ibuprofen for pain.       I personally reviewed prior external notes and test results pertinent to today's visit. Pathogenesis of diagnosis discussed including typical length and natural progression. Supportive care, natural history, differential diagnoses, and indications for immediate follow-up discussed. Patient expresses understanding and agrees to plan. Patient denies any other questions or concerns.     Follow-up with the primary care physician for recheck, reevaluation, and consideration of further management.    Please note that this dictation was created using voice recognition software. I have made a reasonable attempt to correct obvious errors, but I expect that there are  errors of grammar and possibly content that I did not discover before finalizing the note.    This note was electronically signed by Franklyn Matias PA-C

## 2022-10-11 ENCOUNTER — APPOINTMENT (OUTPATIENT)
Dept: URGENT CARE | Facility: CLINIC | Age: 42
End: 2022-10-11
Payer: COMMERCIAL

## 2022-10-13 ENCOUNTER — TELEPHONE (OUTPATIENT)
Dept: SCHEDULING | Facility: IMAGING CENTER | Age: 42
End: 2022-10-13

## 2022-10-25 ENCOUNTER — OFFICE VISIT (OUTPATIENT)
Dept: MEDICAL GROUP | Facility: MEDICAL CENTER | Age: 42
End: 2022-10-25
Payer: COMMERCIAL

## 2022-10-25 ENCOUNTER — HOSPITAL ENCOUNTER (OUTPATIENT)
Facility: MEDICAL CENTER | Age: 42
End: 2022-10-25
Attending: FAMILY MEDICINE
Payer: COMMERCIAL

## 2022-10-25 VITALS
TEMPERATURE: 98.8 F | BODY MASS INDEX: 28.15 KG/M2 | HEART RATE: 67 BPM | OXYGEN SATURATION: 97 % | SYSTOLIC BLOOD PRESSURE: 136 MMHG | DIASTOLIC BLOOD PRESSURE: 78 MMHG | WEIGHT: 212.4 LBS | HEIGHT: 73 IN

## 2022-10-25 DIAGNOSIS — N20.0 NEPHROLITHIASIS: ICD-10-CM

## 2022-10-25 DIAGNOSIS — R10.9 ABDOMINAL PAIN, UNSPECIFIED ABDOMINAL LOCATION: ICD-10-CM

## 2022-10-25 DIAGNOSIS — J45.20 MILD INTERMITTENT ASTHMA WITHOUT COMPLICATION: ICD-10-CM

## 2022-10-25 LAB
APPEARANCE UR: CLEAR
BILIRUB UR QL STRIP.AUTO: NEGATIVE
COLOR UR: YELLOW
GLUCOSE UR STRIP.AUTO-MCNC: NEGATIVE MG/DL
KETONES UR STRIP.AUTO-MCNC: NEGATIVE MG/DL
LEUKOCYTE ESTERASE UR QL STRIP.AUTO: NEGATIVE
MICRO URNS: NORMAL
NITRITE UR QL STRIP.AUTO: NEGATIVE
PH UR STRIP.AUTO: 6.5 [PH] (ref 5–8)
PROT UR QL STRIP: NEGATIVE MG/DL
RBC UR QL AUTO: NEGATIVE
SP GR UR STRIP.AUTO: 1.01
UROBILINOGEN UR STRIP.AUTO-MCNC: 0.2 MG/DL

## 2022-10-25 PROCEDURE — 81003 URINALYSIS AUTO W/O SCOPE: CPT

## 2022-10-25 PROCEDURE — 99214 OFFICE O/P EST MOD 30 MIN: CPT | Performed by: FAMILY MEDICINE

## 2022-10-25 RX ORDER — ALBUTEROL SULFATE 90 UG/1
2 AEROSOL, METERED RESPIRATORY (INHALATION) EVERY 4 HOURS PRN
Qty: 1 EACH | Refills: 1 | Status: SHIPPED | OUTPATIENT
Start: 2022-10-25 | End: 2023-01-09

## 2022-10-25 ASSESSMENT — FIBROSIS 4 INDEX: FIB4 SCORE: 0.68

## 2022-10-25 NOTE — ASSESSMENT & PLAN NOTE
Acute on chronic. Reviewed recent labs and imaging with patient. Discussed renal stones, IBS and MSK as potential causes. Patient to monitor if diet choices can be linked to pain. Will recheck urine for hematuria or infection. Renal US for new or worsening stone burden. Also to try heating pad to see if it relieves discomfort.

## 2022-10-25 NOTE — ASSESSMENT & PLAN NOTE
Chronic condition, that is now seasonally driven. Discussed rule of 2's and if worsening or not appropriately controlled will need to start daily inhaler.

## 2022-10-25 NOTE — PROGRESS NOTES
"Subjective:     CC:  Diagnoses of Nephrolithiasis, Abdominal pain, unspecified abdominal location, and Mild intermittent asthma without complication were pertinent to this visit.    HISTORY OF THE PRESENT ILLNESS: Patient is a 42 y.o. male. This pleasant patient is here today to establish care and discuss abdominal pain. His/her prior PCP was Dr. Anderson.    Asthma  Seems to be seasonal/allergies driven  Watery eyes  Sometimes wheezes and coughs  Never smoker for cigarettes  Inhaler use can be 6 months or between uses but when its bad can use more frequently    Pain in left lower abdomen  Recurrent pain  History of kidney stones that has not passed  Describes a sharp pain, right under lower rib on left side  Pain comes and goes, last few day has been constant when it is active  Pain if fairly localized  Has seen some red in his urine but not every time  Does have history of IBS as well  Has had some darker stools without black stools or obvious blood  Does get some diarrhea sometimes  Has had issues with constipation in past  Does endorse some nausea  Has felt hot, but no chills, no body aches    Problem   Nephrolithiasis   Mild Intermittent Asthma Without Complication   Abdominal Pain       Current Outpatient Medications Ordered in Epic   Medication Sig Dispense Refill    albuterol 108 (90 Base) MCG/ACT Aero Soln inhalation aerosol Inhale 2 Puffs every four hours as needed for Shortness of Breath. 1 Each 1    omeprazole (PRILOSEC) 20 MG delayed-release capsule Take 20 mg by mouth every day.       No current [facility]-ordered facility-administered medications on file.       Health Maintenance: Declines flu shot    ROS:   ROS see HPI      Objective:       Exam: /78   Pulse 67   Temp 37.1 °C (98.8 °F) (Temporal)   Ht 1.854 m (6' 1\")   Wt 96.3 kg (212 lb 6.4 oz)   SpO2 97%  Body mass index is 28.02 kg/m².    Physical Exam  Vitals reviewed.   Constitutional:       General: He is not in acute distress.     " Appearance: Normal appearance.   HENT:      Head: Normocephalic and atraumatic.   Cardiovascular:      Rate and Rhythm: Normal rate and regular rhythm.      Heart sounds: Normal heart sounds.   Pulmonary:      Effort: Pulmonary effort is normal.      Breath sounds: Normal breath sounds.   Abdominal:      General: There is no distension.      Palpations: Abdomen is soft. There is no mass.      Tenderness: There is abdominal tenderness (mild tenderness on left flank). There is no right CVA tenderness, left CVA tenderness, guarding or rebound.   Skin:     General: Skin is warm and dry.   Neurological:      Mental Status: He is alert. Mental status is at baseline.   Psychiatric:         Mood and Affect: Mood normal.         Behavior: Behavior normal.         Assessment & Plan:   42 y.o. male with the following -    Problem List Items Addressed This Visit       Abdominal pain     Acute on chronic. Reviewed recent labs and imaging with patient. Discussed renal stones, IBS and MSK as potential causes. Patient to monitor if diet choices can be linked to pain. Will recheck urine for hematuria or infection. Renal US for new or worsening stone burden. Also to try heating pad to see if it relieves discomfort.         Relevant Orders    URINALYSIS,CULTURE IF INDICATED    Comp Metabolic Panel    Mild intermittent asthma without complication     Chronic condition, that is now seasonally driven. Discussed rule of 2's and if worsening or not appropriately controlled will need to start daily inhaler.         Relevant Medications    albuterol 108 (90 Base) MCG/ACT Aero Soln inhalation aerosol    Nephrolithiasis     Patient to continue to maintain adequate hydration  Follow up with labs and imaging         Relevant Orders    URINALYSIS,CULTURE IF INDICATED    US-RENAL       I spent a total of 31 minutes with record review, exam, communication with the patient, communication with other providers, and documentation of this  encounter.    Return in about 4 weeks (around 11/22/2022) for Lab F/U, Imaging F/U.    Please note that this dictation was created using voice recognition software. I have made every reasonable attempt to correct obvious errors, but I expect that there are errors of grammar and possibly content that I did not discover before finalizing the note.

## 2022-12-12 ENCOUNTER — OFFICE VISIT (OUTPATIENT)
Dept: URGENT CARE | Facility: CLINIC | Age: 42
End: 2022-12-12
Payer: COMMERCIAL

## 2022-12-12 DIAGNOSIS — J32.9 RHINOSINUSITIS: ICD-10-CM

## 2022-12-12 PROCEDURE — 99213 OFFICE O/P EST LOW 20 MIN: CPT | Performed by: FAMILY MEDICINE

## 2022-12-12 RX ORDER — AMOXICILLIN AND CLAVULANATE POTASSIUM 875; 125 MG/1; MG/1
1 TABLET, FILM COATED ORAL 2 TIMES DAILY
Qty: 14 TABLET | Refills: 0 | Status: SHIPPED | OUTPATIENT
Start: 2022-12-12 | End: 2022-12-19

## 2022-12-12 ASSESSMENT — ENCOUNTER SYMPTOMS
MYALGIAS: 0
EYE REDNESS: 0
VOMITING: 0
EYE DISCHARGE: 0
WEIGHT LOSS: 0
NAUSEA: 0

## 2022-12-12 ASSESSMENT — FIBROSIS 4 INDEX: FIB4 SCORE: 0.68

## 2022-12-13 NOTE — PROGRESS NOTES
"Subjective     Checo Abarca is a 42 y.o. male who presents with Sinus Problem (1wk sinus pressure , ear pain and popping )            1 week sinus pressure and drainage. No PMH sinusitis. No fever.  Ears feel full.  No drainage from ears.  Mild sore throat and cough due to drainage.  No shortness of breath or wheezing.  No known exposures. Home C19 test negative. No other aggravating or alleviating factors.      Review of Systems   Constitutional:  Negative for malaise/fatigue and weight loss.   Eyes:  Negative for discharge and redness.   Gastrointestinal:  Negative for nausea and vomiting.   Musculoskeletal:  Negative for joint pain and myalgias.   Skin:  Negative for itching and rash.            Objective     BP (P) 128/70 (BP Location: Left arm, Patient Position: Sitting, BP Cuff Size: Adult)   Pulse (P) 66   Temp (P) 36.7 °C (98.1 °F) (Temporal)   Resp (P) 16   Ht (P) 1.854 m (6' 1\")   Wt (P) 92.5 kg (204 lb)   SpO2 (P) 99%   BMI (P) 26.91 kg/m²      Physical Exam  Constitutional:       General: He is not in acute distress.     Appearance: He is well-developed.   HENT:      Head: Normocephalic and atraumatic.   Eyes:      Conjunctiva/sclera: Conjunctivae normal.   Cardiovascular:      Rate and Rhythm: Normal rate and regular rhythm.      Heart sounds: Normal heart sounds. No murmur heard.  Pulmonary:      Effort: Pulmonary effort is normal.      Breath sounds: Normal breath sounds. No wheezing.   Skin:     General: Skin is warm and dry.      Findings: No rash.   Neurological:      Mental Status: He is alert.                           Assessment & Plan         1. Rhinosinusitis  amoxicillin-clavulanate (AUGMENTIN) 875-125 MG Tab        Differential diagnosis, natural history, supportive care, and indications for immediate follow-up discussed at length.     Nasal saline, decongestant, nasal CS.           "

## 2023-01-05 ENCOUNTER — OFFICE VISIT (OUTPATIENT)
Dept: URGENT CARE | Facility: PHYSICIAN GROUP | Age: 43
End: 2023-01-05
Payer: COMMERCIAL

## 2023-01-05 VITALS
DIASTOLIC BLOOD PRESSURE: 68 MMHG | HEART RATE: 59 BPM | SYSTOLIC BLOOD PRESSURE: 112 MMHG | RESPIRATION RATE: 14 BRPM | OXYGEN SATURATION: 96 % | HEIGHT: 73 IN | WEIGHT: 212 LBS | BODY MASS INDEX: 28.1 KG/M2 | TEMPERATURE: 97.2 F

## 2023-01-05 DIAGNOSIS — S86.911A STRAIN OF RIGHT KNEE, INITIAL ENCOUNTER: ICD-10-CM

## 2023-01-05 PROCEDURE — 99213 OFFICE O/P EST LOW 20 MIN: CPT | Performed by: NURSE PRACTITIONER

## 2023-01-05 RX ORDER — HYDROCODONE BITARTRATE AND ACETAMINOPHEN 5; 325 MG/1; MG/1
1-2 TABLET ORAL EVERY 6 HOURS PRN
Qty: 12 TABLET | Refills: 0 | Status: SHIPPED | OUTPATIENT
Start: 2023-01-05 | End: 2023-01-08

## 2023-01-05 RX ORDER — IBUPROFEN 600 MG/1
600 TABLET ORAL EVERY 6 HOURS PRN
Qty: 30 TABLET | Refills: 0 | Status: SHIPPED | OUTPATIENT
Start: 2023-01-05 | End: 2023-03-01

## 2023-01-05 ASSESSMENT — FIBROSIS 4 INDEX: FIB4 SCORE: 0.68

## 2023-01-05 NOTE — PATIENT INSTRUCTIONS
-NSAID's (ibuprofen) and tylenol as directed for pain and inflammation.   -RICE Therapy: Rest, Ice, Compression, Elevation  -Limited weight bearing for 2-3 days;  and/or weight bearing as tolerated. Use crutches until able to walk with normal gait.    -Ice 15 to 20 minutes every two to three hours for the first 48 hours or until swelling is improved.  -Knee brace or Compression with an elastic bandage for swelling.  -Gentle range of motion exercises.     Follow up with PCP. Follow up emergently for severe uncontrolled pain, neurovascular compromise (decreased sensation, motion, or circulation).

## 2023-01-05 NOTE — PROGRESS NOTES
Subjective:     Diego Abarca is a 42 y.o. male who presents for Knee Injury (R knee, Pain after bending down to help her daughter, painful to walk and ROM, warm on medial side.)      Knee pain started with bending down to  his daughter. States he was also snowboarding. Pain to medial knee. Burning. No numbness. Feels knee gives. Pain 6/10. Took Flexeril. Had also pulled his thigh 1 week ago.         Knee Injury  This is a new problem. The current episode started yesterday. Associated symptoms include joint swelling. The symptoms are aggravated by walking and bending. He has tried ice and NSAIDs for the symptoms.     Past Medical History:   Diagnosis Date    Alpha 1-antitrypsin PiMS phenotype     Asthma     Chickenpox     Constipation     Martiniquais measles     Irritable bowel syndrome (IBS)     Mumps        Past Surgical History:   Procedure Laterality Date    ARTHROPLASTY  01/20/2021    RT ankle     OTHER ORTHOPEDIC SURGERY Left 2002    knee       Social History     Socioeconomic History    Marital status:      Spouse name: Not on file    Number of children: Not on file    Years of education: Not on file    Highest education level: GED or equivalent   Occupational History    Not on file   Tobacco Use    Smoking status: Never    Smokeless tobacco: Never   Vaping Use    Vaping Use: Never used   Substance and Sexual Activity    Alcohol use: Not Currently     Comment: quit drinking Sept 3, 2021    Drug use: Yes     Types: Marijuana, Inhaled     Comment: daily, couple times a day    Sexual activity: Yes     Partners: Female     Comment: : 2011   Other Topics Concern    Not on file   Social History Narrative    Not on file     Social Determinants of Health     Financial Resource Strain: Low Risk     Difficulty of Paying Living Expenses: Not hard at all   Food Insecurity: No Food Insecurity    Worried About Running Out of Food in the Last Year: Never true    Ran Out of Food in the Last Year:  "Never true   Transportation Needs: No Transportation Needs    Lack of Transportation (Medical): No    Lack of Transportation (Non-Medical): No   Physical Activity: Sufficiently Active    Days of Exercise per Week: 3 days    Minutes of Exercise per Session: 50 min   Stress: Stress Concern Present    Feeling of Stress : Rather much   Social Connections: Moderately Isolated    Frequency of Communication with Friends and Family: More than three times a week    Frequency of Social Gatherings with Friends and Family: Three times a week    Attends Restoration Services: Never    Active Member of Clubs or Organizations: No    Attends Club or Organization Meetings: Never    Marital Status:    Intimate Partner Violence: Not on file   Housing Stability: Low Risk     Unable to Pay for Housing in the Last Year: No    Number of Places Lived in the Last Year: 1    Unstable Housing in the Last Year: No        Family History   Problem Relation Age of Onset    No Known Problems Mother     Lung Disease Maternal Grandfather         alpha-1 antitrypsin    Diabetes Maternal Grandfather     Heart Disease Maternal Grandfather     Cancer Child         leukemia type B    Stroke Other     Drug abuse Neg Hx     Alcohol abuse Neg Hx         Allergies   Allergen Reactions    Azithromycin Vomiting and Nausea       Review of Systems   Musculoskeletal:  Positive for joint pain and joint swelling.   Neurological:  Negative for sensory change.   All other systems reviewed and are negative.     Objective:   /68 (BP Location: Left arm, Patient Position: Sitting, BP Cuff Size: Adult)   Pulse (!) 59   Temp 36.2 °C (97.2 °F) (Temporal)   Resp 14   Ht 1.854 m (6' 1\")   Wt 96.2 kg (212 lb)   SpO2 96%   BMI 27.97 kg/m²     Physical Exam  Vitals reviewed.   Pulmonary:      Effort: Pulmonary effort is normal. No respiratory distress.   Musculoskeletal:         General: Tenderness present. No swelling or deformity.      Right knee: Tenderness " present over the medial joint line and MCL. Normal alignment.      Comments: Mild MCL TTP. No swelling or deformity. Palpable pop with ROM. Distal neurovascular intact. Reported increased pain with flexion. Mildly antalgic gait. Able to bear weight. Normal stride with ambulation. No TTP to distal quadricept, or deformity noted.    Skin:     General: Skin is warm and dry.      Findings: No bruising or erythema.   Neurological:      Mental Status: He is alert and oriented to person, place, and time.       Assessment/Plan:   1. Strain of right knee, initial encounter  - Referral to Sports Medicine  - ibuprofen (MOTRIN) 600 MG Tab; Take 1 Tablet by mouth every 6 hours as needed for Moderate Pain or Inflammation.  Dispense: 30 Tablet; Refill: 0  - HYDROcodone-acetaminophen (NORCO) 5-325 MG Tab per tablet; Take 1-2 Tablets by mouth every 6 hours as needed (Severe knee pain) for up to 3 days.  Dispense: 12 Tablet; Refill: 0    Other orders  - Consent for Opiate Prescription    -NSAID's (ibuprofen) and tylenol as directed for pain and inflammation.   -RICE Therapy: Rest, Ice, Compression, Elevation  -Limited weight bearing for 2-3 days;  and/or weight bearing as tolerated. Use crutches until able to walk with normal gait.    -Ice 15 to 20 minutes every two to three hours for the first 48 hours or until swelling is improved.  -Knee brace or Compression with an elastic bandage for swelling.  -Gentle range of motion exercises.     Follow up with PCP. Follow up emergently for severe uncontrolled pain, neurovascular compromise (decreased sensation, motion, or circulation).     Has crutches at home. Discussed soft tissue injury, initial conservative measures, with f/u for persistent or worsening symptoms.     Differential diagnosis, natural history, supportive care, and indications for immediate follow-up discussed.

## 2023-01-09 RX ORDER — ALBUTEROL SULFATE 90 UG/1
AEROSOL, METERED RESPIRATORY (INHALATION)
Qty: 6.7 EACH | Refills: 1 | Status: SHIPPED | OUTPATIENT
Start: 2023-01-09 | End: 2023-09-20

## 2023-01-10 ENCOUNTER — APPOINTMENT (OUTPATIENT)
Dept: RADIOLOGY | Facility: IMAGING CENTER | Age: 43
End: 2023-01-10
Attending: FAMILY MEDICINE
Payer: COMMERCIAL

## 2023-01-10 ENCOUNTER — OFFICE VISIT (OUTPATIENT)
Dept: SPORTS MEDICINE | Facility: CLINIC | Age: 43
End: 2023-01-10
Payer: COMMERCIAL

## 2023-01-10 VITALS
HEIGHT: 73 IN | SYSTOLIC BLOOD PRESSURE: 110 MMHG | HEART RATE: 85 BPM | OXYGEN SATURATION: 95 % | WEIGHT: 212 LBS | DIASTOLIC BLOOD PRESSURE: 72 MMHG | TEMPERATURE: 98.4 F | RESPIRATION RATE: 16 BRPM | BODY MASS INDEX: 28.1 KG/M2

## 2023-01-10 DIAGNOSIS — M25.561 ACUTE PAIN OF RIGHT KNEE: ICD-10-CM

## 2023-01-10 DIAGNOSIS — M22.2X1 PATELLOFEMORAL PAIN SYNDROME OF RIGHT KNEE: ICD-10-CM

## 2023-01-10 DIAGNOSIS — S83.411A TEAR OF MEDIAL COLLATERAL LIGAMENT OF RIGHT KNEE, INITIAL ENCOUNTER: ICD-10-CM

## 2023-01-10 PROCEDURE — 73564 X-RAY EXAM KNEE 4 OR MORE: CPT | Mod: TC,RT | Performed by: PHYSICIAN ASSISTANT

## 2023-01-10 PROCEDURE — 99213 OFFICE O/P EST LOW 20 MIN: CPT | Performed by: FAMILY MEDICINE

## 2023-01-10 ASSESSMENT — FIBROSIS 4 INDEX: FIB4 SCORE: 0.68

## 2023-01-10 NOTE — PROGRESS NOTES
Chief Complaint   Patient presents with    Knee Pain     Referral from / R knee pain      CHIEF COMPLAINT:  Diego Abarca male presenting at the request of TIN Pemberton  for evaluation of knee pain.     Diego Abarca is complaining of right knee pain  On vacation end of December and went surfing, may have pulled adductor muscles of the RIGHT thigh  Was sore and weak with lifting RIGHT thigh  Then, 1/4/23 was snowboarding, had been kneeling to help his daughter with his snowboard strapped onto both feet.  He went to get up with his palms on the ground and as he straighten his legs out and planted his feet he felt a sudden sharp pain and a pop at the medial aspect of the RIGHT knee  Pain is at the medial  knee  Quality is aching/still sharp  Pain is non-radiating   Improved with resting  Aggravated by pivoting, knee extension and holding  previous knee injury intermittently with pain lasting for a week or so after activity   Prior Treatments:  seen at   Prior studies: NO Prior imaging has been done   Medications tried for pain include: ibuprofen and hydrocodone helped some  Mechanical Symptom history: No Locking and Popping which can be uncomfortable      Snowboarding, paddle boarding, skateboarding    REVIEW OF SYSTEMS  No Nausea, No Vomiting, No Chest Pain, No Shortness of Breath, No Dizziness, No Headache    PAST MEDICAL HISTORY:   History reviewed. No pertinent past medical history.    PMH:  has a past medical history of Alpha 1-antitrypsin PiMS phenotype, Asthma, Chickenpox, Constipation, Czech measles, Irritable bowel syndrome (IBS), and Mumps.  MEDS:   Current Outpatient Medications:     albuterol 108 (90 Base) MCG/ACT Aero Soln inhalation aerosol, INHALE 2 PUFFS BY MOUTH EVERY FOUR HOURS AS NEEDED FOR SHORTNESS OF BREATH., Disp: 6.7 Each, Rfl: 1    ibuprofen (MOTRIN) 600 MG Tab, Take 1 Tablet by mouth every 6 hours as needed for Moderate Pain or Inflammation.,  "Disp: 30 Tablet, Rfl: 0    omeprazole (PRILOSEC) 20 MG delayed-release capsule, Take 20 mg by mouth every day., Disp: , Rfl:   ALLERGIES:   Allergies   Allergen Reactions    Azithromycin Vomiting and Nausea     SURGHX:   Past Surgical History:   Procedure Laterality Date    ARTHROPLASTY  01/20/2021    RT ankle     OTHER ORTHOPEDIC SURGERY Left 2002    knee     SOCHX:  reports that he has never smoked. He has never used smokeless tobacco. He reports that he does not currently use alcohol. He reports current drug use. Drugs: Marijuana and Inhaled.  FH: Family history was reviewed, no pertinent findings to report     PHYSICAL EXAM:  /72 (BP Location: Left arm, Patient Position: Sitting, BP Cuff Size: Adult)   Pulse 85   Temp 36.9 °C (98.4 °F) (Temporal)   Resp 16   Ht 1.854 m (6' 1\")   Wt 96.2 kg (212 lb)   SpO2 95%   BMI 27.97 kg/m²      well-developed, well-nourished in no apparent distress, alert and oriented x 3.  Gait: antalgic and difficulty fully extending the knee during gait     RIGHT Knee:  Slight Varus and No Swelling  Range of Motion Intact  Trace effusion  Patellar No tenderness and no apprehension  Medial Joint Line Tenderness and NEGATIVE Genesis  And POSITIVE tenderness along the trajectory of the MCL and at its insertion  Lateral Joint Line Non-tender and NEGATIVE Genesis  Trace Laxity with Varus stress  1+ Laxity with Valgus stress  Lachman's testing is Trace  Posterior Drawer Testing is Trace  The leg is otherwise neurovascularly intact    LEFT Knee:  Slight Varus and No Swelling   Range of Motion Intact  Trace effusion  Patellar No tenderness and no apprehension  Medial Joint Line Tenderness and NEGATIVE Genesis  Lateral Joint Line Non-tender and NEGATIVE Genesis  Trace Laxity with Varus stress  Trace Laxity with Valgus stress  Lachman's testing is Trace  Posterior Drawer Testing is Trace  The leg is otherwise neurovascularly intact    Additional Findings: None    1. Acute pain of " right knee  DX-KNEE COMPLETE 4+ RIGHT      2. Tear of medial collateral ligament of right knee, initial encounter  DX-KNEE COMPLETE 4+ RIGHT      3. Patellofemoral pain syndrome of right knee  DX-KNEE COMPLETE 4+ RIGHT        On vacation end of December and went surfing, may have pulled adductor muscles of the RIGHT thigh  Was sore and weak with lifting RIGHT thigh  Then, 1/4/23 was snowboarding, had been kneeling to help his daughter with his snowboard strapped onto both feet.  He went to get up with his palms on the ground and as he straighten his legs out and planted his feet he felt a sudden sharp pain and a pop at the medial aspect of the RIGHT knee    RIGHT knee x-rays performed in the office January 10, 2023 demonstrate mild medial joint space narrowing  Otherwise normal    Return in about 2 weeks (around 1/24/2023).  To see how he is doing with his RIGHT knee MCL sprain              1/10/2023 1:56 PM     HISTORY/REASON FOR EXAM:  Pain/Deformity Following Trauma; medial knee pain.        TECHNIQUE/EXAM DESCRIPTION AND NUMBER OF VIEWS:  4 views of the RIGHT knee.     COMPARISON: 8/29/2018     FINDINGS:  There is no fracture or dislocation.  The visualized osseous structures are in anatomic alignment.  Joint spaces are preserved.  Bone mineralization is age-appropriate..  No significant joint effusion.     IMPRESSION:     Normal.           Exam Ended: 01/10/23  2:05 PM Last Resulted: 01/10/23  2:07 PM           Interpreted in the office today with the patient    Thank you TIN Pemberton for allowing me to participate in caring for your patient.

## 2023-01-11 ASSESSMENT — ENCOUNTER SYMPTOMS
SENSORY CHANGE: 0
JOINT SWELLING: 1

## 2023-03-01 ENCOUNTER — TELEMEDICINE (OUTPATIENT)
Dept: MEDICAL GROUP | Facility: PHYSICIAN GROUP | Age: 43
End: 2023-03-01
Payer: COMMERCIAL

## 2023-03-01 VITALS
HEART RATE: 60 BPM | HEIGHT: 73 IN | WEIGHT: 195 LBS | BODY MASS INDEX: 25.84 KG/M2 | TEMPERATURE: 98.7 F | OXYGEN SATURATION: 93 %

## 2023-03-01 DIAGNOSIS — R21 RASH: ICD-10-CM

## 2023-03-01 DIAGNOSIS — U07.1 COVID-19 VIRUS INFECTION: ICD-10-CM

## 2023-03-01 PROCEDURE — 99214 OFFICE O/P EST MOD 30 MIN: CPT | Mod: 95 | Performed by: FAMILY MEDICINE

## 2023-03-01 RX ORDER — TRIAMCINOLONE ACETONIDE 0.25 MG/G
1 CREAM TOPICAL 2 TIMES DAILY
Qty: 15 G | Refills: 0 | Status: SHIPPED | OUTPATIENT
Start: 2023-03-01 | End: 2023-10-17

## 2023-03-01 ASSESSMENT — PATIENT HEALTH QUESTIONNAIRE - PHQ9
CLINICAL INTERPRETATION OF PHQ2 SCORE: 4
SUM OF ALL RESPONSES TO PHQ QUESTIONS 1-9: 16
5. POOR APPETITE OR OVEREATING: 0 - NOT AT ALL

## 2023-03-01 ASSESSMENT — FIBROSIS 4 INDEX: FIB4 SCORE: 0.68

## 2023-03-01 NOTE — PATIENT INSTRUCTIONS
FOR YOUR COVID    -  Stay home except to get medical care.  -  Follow the instructions for home isolation below.  -  Call ahead before visiting your healthcare provider or a hospital.  -  Go to the nearest emergency department for worsening symptoms including difficulty breathing, ongoing fever, weakness or chest pain.    You should isolate yourself:  -  For a minimum of 5 days from symptom onset or positive test and  -  At least 24 hours have passed since your last fever without the use of fever-reducing medications and  -  All other symptoms have improved (loss of taste and smell may persist for weeks or months after recovery and should not delay the end of isolation)  - Once you meet the above criteria to leave the house, you will continue to wear a mask when around others for 5 additional days    Instructions for Self-Isolation at Home:  -  We recommend you stay in your home and minimize contact with others to avoid spreading this infection.  -  Do not go to work, school or public areas unless otherwise directed by the health department. Avoid using public transportation, ridesharing or taxis.  -  Separate yourself from other people in your home as much as possible.  -  Stay in a specific room and away from other people in your home as much as possible. Use a separate bathroom if possible.    When seeking care at a healthcare facility:  -  Seek prompt medical attention if your illness is worsening (e.g., difficulty breathing).  -  When possible, call the healthcare provider before arriving.  -  Put on a facemask before you enter the facility.  -  If possible, put on a facemask before the ambulance or paramedics arrive.  -  These steps will help the healthcare provider's office prevent other people from getting infected or exposed.    For any further questions regarding COVID-19, please contact your county's health department or healthcare provider.

## 2023-03-01 NOTE — PROGRESS NOTES
"Virtual Visit: Established Patient   This visit was conducted via Zoom using secure and encrypted videoconferencing technology.   The patient was in their home in the state of [place].    The patient's identity was confirmed and verbal consent was obtained for this virtual visit.    Subjective:   CC:   Chief Complaint   Patient presents with    Covid-19 Home Monitoring Video Visit     Symptoms started Monday night; Tested positive today     THU EASTON is a 42 y.o. male presenting for evaluation and management of:    Problem   Covid-19 Virus Infection    Symptom onset: 2/27/2023  Symptoms: sore throat, night sweats, congestion, cramping, fevers/chills, intermittent chest pain (none today)  Denies: SOB  Positive COVID-19 test: 3/1/2023  High risk factors:  - overweight BMI >25    PRIOR TO administering Molnupiravir, I informed the patient or parent/caregiver of the following information:   · I provided them the  \"Fact Sheet for Patients and Parents/Caregivers\"    · I informed them of therapeutic alternatives to Molnupiravirand the risks and benefits of those alternatives   · I informed them that they have the option to accept or refuse Molnupiravir,   · I informed them that Molnupiraviris not FDA approved, but that it is authorized for use under emergency by the FDA   · I informed them of the known risks and benefits of Molnupiravir and discussed the extent to which such risks and benefits are unknown      After reviewing all of this, he would like to proceed with the medication.         Rash    Chronic, recurrent  Noted on the right face  Similar to previous rash  Redness  Itchy, feels hot       ROS   See HPI    Current medicines (including changes today)  Current Outpatient Medications   Medication Sig Dispense Refill    ALPRAZolam (XANAX PO) Take  by mouth.      molnupiravir 200 MG capsule Take 4 Capsules by mouth 2 times a day for 5 days. 40 Capsule 0    triamcinolone acetonide (KENALOG) 0.025 % Cream " "Apply 1 Application topically 2 times a day. DO NOT USE LONGER THAN 14 DAYS. 15 g 0    albuterol 108 (90 Base) MCG/ACT Aero Soln inhalation aerosol INHALE 2 PUFFS BY MOUTH EVERY FOUR HOURS AS NEEDED FOR SHORTNESS OF BREATH. 6.7 Each 1    omeprazole (PRILOSEC) 20 MG delayed-release capsule Take 20 mg by mouth every day.       No current facility-administered medications for this visit.       Patient Active Problem List    Diagnosis Date Noted    COVID-19 virus infection 03/01/2023    Nephrolithiasis 07/28/2022    Rash 07/28/2022    Elevated liver enzymes 04/27/2022    Dermatitis 03/15/2022    Other constipation 03/15/2022    Headache 02/03/2022    Anxiety 02/03/2022    Right ear pain 02/01/2022    Gastroesophageal reflux disease with esophagitis 11/10/2021    Costochondral chest pain 11/10/2021    Skin lesion 04/19/2021    Skin tag 10/15/2020    Pain of finger of left hand 09/08/2020    Thoracic back pain 06/12/2020    Dyshidrotic eczema 06/12/2020    Gout 05/18/2020    Cyst of skin 05/18/2020    Mild intermittent asthma without complication 05/18/2020    Right elbow pain 05/18/2020    Abdominal pain 01/04/2017        Objective:   Pulse 60 Comment: pt stated  Temp 37.1 °C (98.7 °F) (Oral) Comment: pt stated  Ht 1.854 m (6' 1\")   Wt 88.5 kg (195 lb) Comment: pt stated  SpO2 93% Comment: pt stated  BMI 25.73 kg/m²     Physical Exam:  Constitutional: Alert, no distress, well-groomed.  Skin: No rashes in visible areas.  Eye: Round. Conjunctiva clear, lids normal. No icterus.   ENMT: Lips pink without lesions, good dentition, moist mucous membranes. Phonation normal.  Neck: No masses, no thyromegaly. Moves freely without pain.  Respiratory: Unlabored respiratory effort, no cough or audible wheeze  Psych: Alert and oriented x3, normal affect and mood.     Assessment and Plan:   The following treatment plan was discussed:     1. COVID-19 virus infection  Acute, high risk for developing severe infection requiring " hospitalization.  2-3 days of symptoms and he tested positive for COVID-19 at home today.  He does qualify for treatment as he could be considered high risk for severe illness due to his BMI being in the overweight category.  His last renal labs were more than 6 months ago so Paxlovid is not an option.  After reviewing the molnupiravir patient handout he wanted to proceed with treatment so prescription was sent to his pharmacy.  We reviewed COVID-19 self-isolation guidelines.  Strict ER precautions were also discussed.  - Molnupiravir 800 mg twice daily x5 days    2. Rash  Chronic, recurrent, acutely exacerbated.  He reports for quite some time he will get this recurrent rash on his face.  Recently cropped up again in the last few days with redness, itching, and hot sensation of the right cheek.  - Try triamcinolone cream  - If this does not improve the rash he needs to make an appointment with his PCP to have it evaluated in person      Follow-up: Return if symptoms worsen or fail to improve.

## 2023-06-15 NOTE — OP THERAPY DAILY TREATMENT
"  Outpatient Physical Therapy  DAILY TREATMENT     [facility] [address]  [address]  [place] [address]  Phone:  [phone]  Fax:  [phone]    Date: 10/09/2018    Patient: Diego Abarca  YOB: 1980  MRN: [id number]     Time Calculation  Start time: 0837  Stop time: 0900 Time Calculation (min): 23 minutes     Chief Complaint: Knee Problem    Visit #: 5    SUBJECTIVE:  No pain in the knee for the last week.  Minimal ability to participate in therex the last couple days due to \"tweaking\" the neck.     OBJECTIVE:  Current objective measures: WOMAC Grand Total: 5.21     Therapeutic Exercises (CPT 26763):     1. Elliptical , x 5 min at L5    2. Roller, alt GHJ flexion, angels, pec str, upper t/s mobilizations, horiz mid t/s mobs    4. Side plank, 3x15\" ea    6. Goblet squat , 5# x 20    7. SL deadlift, 5# x 10 ea side    8. Lunges , 15# B to sides, x 10 ea side.       Time-based treatments/modalities:  Therapeutic exercise minutes (CPT 59305): 23 minutes       ASSESSMENT:   Response to treatment: Pt demonstrates indep with knee biomechanics and HEP.  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     PLAN/RECOMMENDATIONS:   Plan for treatment: no further treatment needed.  Planned interventions for next visit: none, d/c.      "
Detail Level: Zone
Add High Risk Medication Management Associated Diagnosis?: No

## 2023-06-19 ENCOUNTER — OFFICE VISIT (OUTPATIENT)
Dept: URGENT CARE | Facility: PHYSICIAN GROUP | Age: 43
End: 2023-06-19
Payer: COMMERCIAL

## 2023-06-19 VITALS
HEART RATE: 60 BPM | WEIGHT: 209.8 LBS | RESPIRATION RATE: 16 BRPM | OXYGEN SATURATION: 94 % | DIASTOLIC BLOOD PRESSURE: 70 MMHG | SYSTOLIC BLOOD PRESSURE: 130 MMHG | TEMPERATURE: 97.5 F | BODY MASS INDEX: 27.8 KG/M2 | HEIGHT: 73 IN

## 2023-06-19 DIAGNOSIS — R68.83 CHILLS: ICD-10-CM

## 2023-06-19 DIAGNOSIS — R52 GENERALIZED BODY ACHES: ICD-10-CM

## 2023-06-19 DIAGNOSIS — R39.15 URINARY URGENCY: ICD-10-CM

## 2023-06-19 DIAGNOSIS — K12.0 CANKER SORE: ICD-10-CM

## 2023-06-19 LAB
APPEARANCE UR: CLEAR
BILIRUB UR STRIP-MCNC: NORMAL MG/DL
COLOR UR AUTO: YELLOW
GLUCOSE UR STRIP.AUTO-MCNC: NORMAL MG/DL
KETONES UR STRIP.AUTO-MCNC: NORMAL MG/DL
LEUKOCYTE ESTERASE UR QL STRIP.AUTO: NORMAL
NITRITE UR QL STRIP.AUTO: NORMAL
PH UR STRIP.AUTO: 6.5 [PH] (ref 5–8)
PROT UR QL STRIP: NORMAL MG/DL
RBC UR QL AUTO: NORMAL
SP GR UR STRIP.AUTO: 1.01
UROBILINOGEN UR STRIP-MCNC: 0.2 MG/DL

## 2023-06-19 PROCEDURE — 3078F DIAST BP <80 MM HG: CPT | Performed by: PHYSICIAN ASSISTANT

## 2023-06-19 PROCEDURE — 3075F SYST BP GE 130 - 139MM HG: CPT | Performed by: PHYSICIAN ASSISTANT

## 2023-06-19 PROCEDURE — 99213 OFFICE O/P EST LOW 20 MIN: CPT | Performed by: PHYSICIAN ASSISTANT

## 2023-06-19 PROCEDURE — 81002 URINALYSIS NONAUTO W/O SCOPE: CPT | Performed by: PHYSICIAN ASSISTANT

## 2023-06-19 ASSESSMENT — FIBROSIS 4 INDEX: FIB4 SCORE: 0.7

## 2023-06-19 NOTE — PROGRESS NOTES
"Subjective:   THU EASTON is a 43 y.o. male who presents for Body Aches (Fever, fatigue, chills, body feels still, onset last night)      HPI  The patient presents to the Urgent Care with complaints of general symptoms of generalized body aches and chills onset last night.  He was sweating and had chills last night.  Normal temperatures at home.  Neck soreness.  Mild sore throat in the sore to his right mouth near his gum.  Some urinary urgency but no dysuria.  No abdominal pain.  Denies any congestion, cough, chest pain, SOB, vomiting, diarrhea. No known sick contacts. Has kids that are not sick.     Tested negative for COVID at home recently.     Past Medical History:   Diagnosis Date    Alpha 1-antitrypsin PiMS phenotype     Asthma     Chickenpox     Constipation     [place] measles     Irritable bowel syndrome (IBS)     Mumps      Allergies   Allergen Reactions    Azithromycin Vomiting and Nausea        Objective:     /70 (BP Location: Right arm, Patient Position: Sitting, BP Cuff Size: Adult)   Pulse 60   Temp 36.4 °C (97.5 °F) (Temporal)   Resp 16   Ht 1.854 m (6' 1\")   Wt 95.2 kg (209 lb 12.8 oz)   SpO2 94%   BMI 27.68 kg/m²     Physical Exam  Vitals reviewed.   Constitutional:       General: He is not in acute distress.     Appearance: Normal appearance. He is not ill-appearing or toxic-appearing.   HENT:      Mouth/Throat:      Mouth: Mucous membranes are moist.        Comments: Small round white ulcerative lesion to right lower mouth consistent with canker sore. No abscess. No drainage.   Eyes:      Conjunctiva/sclera: Conjunctivae normal.   Cardiovascular:      Rate and Rhythm: Normal rate and regular rhythm.      Heart sounds: Normal heart sounds.   Pulmonary:      Effort: Pulmonary effort is normal. No respiratory distress.      Breath sounds: Normal breath sounds. No wheezing, rhonchi or rales.   Abdominal:      General: Abdomen is flat. Bowel sounds are normal. There is no " distension.      Palpations: Abdomen is soft. There is no mass.      Tenderness: There is no abdominal tenderness. There is no right CVA tenderness, left CVA tenderness, guarding or rebound.   Musculoskeletal:      Cervical back: Normal range of motion and neck supple. No rigidity.   Lymphadenopathy:      Cervical: No cervical adenopathy.   Skin:     General: Skin is warm and dry.   Neurological:      General: No focal deficit present.      Mental Status: He is alert and oriented to person, place, and time.   Psychiatric:         Mood and Affect: Mood normal.         Behavior: Behavior normal.       UA: Normal     Diagnosis and associated orders:     1. Chills    2. Generalized body aches    3. Canker sore    4. Urinary urgency  - POCT Urinalysis       Comments/MDM:     The patient's presenting symptoms and exam findings most likely are due to a viral etiology.   Recommend resting for COVID at home tomorrow night.   Symptomatic and supportive care:   Plenty of oral hydration and rest   Tylenol or ibuprofen for pain and fever as directed.   Warm salt water gargles for sore throat, soft foods, cool liquids.   Saline nasal spray and Flonase  Infection control measures at home. Stay away from people, Hand washing, covering sneeze/cough, disinfect surfaces.   Remain home from work, school, and other populated environments.    Overall, the patient is well-appearing. They are not hypoxic, afebrile, and a normal pulmonary exam. Unable to elicit any abdominal tenderness on exam.      I personally reviewed prior external notes and test results pertinent to today's visit. Pathogenesis of diagnosis discussed including typical length and natural progression. Supportive care, natural history, differential diagnoses, and indications for immediate follow-up discussed. Patient expresses understanding and agrees to plan. Patient denies any other questions or concerns.     Follow-up with the primary care physician for recheck,  reevaluation, and consideration of further management.    Please note that this dictation was created using voice recognition software. I have made a reasonable attempt to correct obvious errors, but I expect that there are errors of grammar and possibly content that I did not discover before finalizing the note.    This note was electronically signed by Franklyn Matias PA-C

## 2023-07-26 NOTE — ASSESSMENT & PLAN NOTE
How Severe Are Your Spot(S)?: moderate
This is a new condition.  Onset: 2-3 days  Duration: intermittent  Location: face, both cheeks  Quality: red bumps  Associated symptoms: +itchy, +irritated, +hot  Home treatments: none    Seen by urgent care on 5/6/2020 - thought contact dermatitis and treated with abx + steroid  
This is a new condition.  Onset: last night  Location: C7-T1 pain  Duration: constant  Quality: stiff/stuck, severe pain  Precipitating trauma: injured while snowboarding a few years ago  Associated symptoms: no numbness/tingling in hands/fingers,  Home treatments: tramadol - not touching it    He has never been to physical therapy.     
This is a new condition. Yesterday got laceration of hand after breaking window. Seen at [facility] where x-ray was taken and then sutures. 3 sutures and plan to remove in ~2 weeks.  
Have Your Spot(S) Been Treated In The Past?: has not been treated
Hpi Title: Evaluation of Skin Lesions

## 2023-09-13 NOTE — ED NOTES
Discharge instructions reviewed with patient. AVS signed by patient. PIV removed. Prescriptions electronically sent to pharmacy of choice. Patient understands need for follow-up appointment with healthcare team and to return to ED for worsening symptoms. All questions answered at this time. Patient ambulated to exit with belongings. Patient in stable condition with no signs of distress. Patient agreeable to discharge instructions.  
MD Steiner at bedside for initial evaluation.  
Med rec updated and complete, per pt   Allergies reviewed, per pt   
PT presents with lower abdominal pain.  States he hasn't been have a bowl movement since February.  Denies N/V/D, or SOB.  
Patient ambulated to bathroom & back to provide urine sample. UA specimen collected & sent to lab.  
Patient returned from CT.  
Patient to CT on [facility] with CT Tech.  
.

## 2023-09-20 RX ORDER — ALBUTEROL SULFATE 90 UG/1
AEROSOL, METERED RESPIRATORY (INHALATION)
Qty: 6.7 EACH | Refills: 1 | Status: SHIPPED | OUTPATIENT
Start: 2023-09-20

## 2023-10-16 SDOH — ECONOMIC STABILITY: HOUSING INSECURITY: IN THE LAST 12 MONTHS, HOW MANY PLACES HAVE YOU LIVED?: 1

## 2023-10-16 SDOH — ECONOMIC STABILITY: FOOD INSECURITY: WITHIN THE PAST 12 MONTHS, YOU WORRIED THAT YOUR FOOD WOULD RUN OUT BEFORE YOU GOT MONEY TO BUY MORE.: NEVER TRUE

## 2023-10-16 SDOH — ECONOMIC STABILITY: TRANSPORTATION INSECURITY
IN THE PAST 12 MONTHS, HAS THE LACK OF TRANSPORTATION KEPT YOU FROM MEDICAL APPOINTMENTS OR FROM GETTING MEDICATIONS?: NO

## 2023-10-16 SDOH — ECONOMIC STABILITY: INCOME INSECURITY: HOW HARD IS IT FOR YOU TO PAY FOR THE VERY BASICS LIKE FOOD, HOUSING, MEDICAL CARE, AND HEATING?: NOT VERY HARD

## 2023-10-16 SDOH — ECONOMIC STABILITY: TRANSPORTATION INSECURITY
IN THE PAST 12 MONTHS, HAS LACK OF RELIABLE TRANSPORTATION KEPT YOU FROM MEDICAL APPOINTMENTS, MEETINGS, WORK OR FROM GETTING THINGS NEEDED FOR DAILY LIVING?: NO

## 2023-10-16 SDOH — ECONOMIC STABILITY: FOOD INSECURITY: WITHIN THE PAST 12 MONTHS, THE FOOD YOU BOUGHT JUST DIDN'T LAST AND YOU DIDN'T HAVE MONEY TO GET MORE.: NEVER TRUE

## 2023-10-16 SDOH — HEALTH STABILITY: PHYSICAL HEALTH: ON AVERAGE, HOW MANY DAYS PER WEEK DO YOU ENGAGE IN MODERATE TO STRENUOUS EXERCISE (LIKE A BRISK WALK)?: 3 DAYS

## 2023-10-16 SDOH — ECONOMIC STABILITY: HOUSING INSECURITY
IN THE LAST 12 MONTHS, WAS THERE A TIME WHEN YOU DID NOT HAVE A STEADY PLACE TO SLEEP OR SLEPT IN A SHELTER (INCLUDING NOW)?: NO

## 2023-10-16 SDOH — ECONOMIC STABILITY: TRANSPORTATION INSECURITY
IN THE PAST 12 MONTHS, HAS LACK OF TRANSPORTATION KEPT YOU FROM MEETINGS, WORK, OR FROM GETTING THINGS NEEDED FOR DAILY LIVING?: NO

## 2023-10-16 SDOH — HEALTH STABILITY: MENTAL HEALTH
STRESS IS WHEN SOMEONE FEELS TENSE, NERVOUS, ANXIOUS, OR CAN'T SLEEP AT NIGHT BECAUSE THEIR MIND IS TROUBLED. HOW STRESSED ARE YOU?: RATHER MUCH

## 2023-10-16 SDOH — HEALTH STABILITY: PHYSICAL HEALTH: ON AVERAGE, HOW MANY MINUTES DO YOU ENGAGE IN EXERCISE AT THIS LEVEL?: 120 MIN

## 2023-10-16 SDOH — ECONOMIC STABILITY: INCOME INSECURITY: IN THE LAST 12 MONTHS, WAS THERE A TIME WHEN YOU WERE NOT ABLE TO PAY THE MORTGAGE OR RENT ON TIME?: NO

## 2023-10-16 ASSESSMENT — SOCIAL DETERMINANTS OF HEALTH (SDOH)
HOW OFTEN DO YOU HAVE SIX OR MORE DRINKS ON ONE OCCASION: MONTHLY
HOW MANY DRINKS CONTAINING ALCOHOL DO YOU HAVE ON A TYPICAL DAY WHEN YOU ARE DRINKING: 1 OR 2
HOW OFTEN DO YOU HAVE A DRINK CONTAINING ALCOHOL: 4 OR MORE TIMES A WEEK
WITHIN THE PAST 12 MONTHS, YOU WORRIED THAT YOUR FOOD WOULD RUN OUT BEFORE YOU GOT THE MONEY TO BUY MORE: NEVER TRUE
HOW OFTEN DO YOU ATTENT MEETINGS OF THE CLUB OR ORGANIZATION YOU BELONG TO?: PATIENT DECLINED
IN A TYPICAL WEEK, HOW MANY TIMES DO YOU TALK ON THE PHONE WITH FAMILY, FRIENDS, OR NEIGHBORS?: MORE THAN THREE TIMES A WEEK
HOW HARD IS IT FOR YOU TO PAY FOR THE VERY BASICS LIKE FOOD, HOUSING, MEDICAL CARE, AND HEATING?: NOT VERY HARD
HOW OFTEN DO YOU ATTENT MEETINGS OF THE CLUB OR ORGANIZATION YOU BELONG TO?: PATIENT DECLINED
IN A TYPICAL WEEK, HOW MANY TIMES DO YOU TALK ON THE PHONE WITH FAMILY, FRIENDS, OR NEIGHBORS?: MORE THAN THREE TIMES A WEEK
DO YOU BELONG TO ANY CLUBS OR ORGANIZATIONS SUCH AS CHURCH GROUPS UNIONS, FRATERNAL OR ATHLETIC GROUPS, OR SCHOOL GROUPS?: NO
DO YOU BELONG TO ANY CLUBS OR ORGANIZATIONS SUCH AS CHURCH GROUPS UNIONS, FRATERNAL OR ATHLETIC GROUPS, OR SCHOOL GROUPS?: NO
HOW OFTEN DO YOU ATTEND CHURCH OR RELIGIOUS SERVICES?: PATIENT DECLINED
HOW OFTEN DO YOU GET TOGETHER WITH FRIENDS OR RELATIVES?: NEVER
HOW OFTEN DO YOU GET TOGETHER WITH FRIENDS OR RELATIVES?: NEVER
HOW OFTEN DO YOU ATTEND CHURCH OR RELIGIOUS SERVICES?: PATIENT DECLINED

## 2023-10-16 ASSESSMENT — LIFESTYLE VARIABLES
HOW MANY STANDARD DRINKS CONTAINING ALCOHOL DO YOU HAVE ON A TYPICAL DAY: 1 OR 2
SKIP TO QUESTIONS 9-10: 0
HOW OFTEN DO YOU HAVE A DRINK CONTAINING ALCOHOL: 4 OR MORE TIMES A WEEK
HOW OFTEN DO YOU HAVE SIX OR MORE DRINKS ON ONE OCCASION: MONTHLY
AUDIT-C TOTAL SCORE: 6

## 2023-10-17 ENCOUNTER — OFFICE VISIT (OUTPATIENT)
Dept: MEDICAL GROUP | Facility: MEDICAL CENTER | Age: 43
End: 2023-10-17
Payer: COMMERCIAL

## 2023-10-17 VITALS
OXYGEN SATURATION: 97 % | TEMPERATURE: 97.9 F | DIASTOLIC BLOOD PRESSURE: 62 MMHG | HEIGHT: 73 IN | WEIGHT: 201.2 LBS | BODY MASS INDEX: 26.66 KG/M2 | SYSTOLIC BLOOD PRESSURE: 114 MMHG | HEART RATE: 70 BPM

## 2023-10-17 DIAGNOSIS — Z13.228 SCREENING FOR ENDOCRINE, METABOLIC AND IMMUNITY DISORDER: ICD-10-CM

## 2023-10-17 DIAGNOSIS — F13.20 BENZODIAZEPINE DEPENDENCE (HCC): ICD-10-CM

## 2023-10-17 DIAGNOSIS — Z13.29 SCREENING FOR ENDOCRINE, METABOLIC AND IMMUNITY DISORDER: ICD-10-CM

## 2023-10-17 DIAGNOSIS — Z13.0 SCREENING FOR ENDOCRINE, METABOLIC AND IMMUNITY DISORDER: ICD-10-CM

## 2023-10-17 DIAGNOSIS — R60.0 LOWER EXTREMITY EDEMA: ICD-10-CM

## 2023-10-17 DIAGNOSIS — Z11.59 NEED FOR HEPATITIS C SCREENING TEST: ICD-10-CM

## 2023-10-17 DIAGNOSIS — F41.9 ANXIETY: ICD-10-CM

## 2023-10-17 DIAGNOSIS — Z13.6 SCREENING FOR CARDIOVASCULAR CONDITION: ICD-10-CM

## 2023-10-17 PROCEDURE — 3074F SYST BP LT 130 MM HG: CPT

## 2023-10-17 PROCEDURE — 3078F DIAST BP <80 MM HG: CPT

## 2023-10-17 PROCEDURE — 99214 OFFICE O/P EST MOD 30 MIN: CPT

## 2023-10-17 RX ORDER — ALPRAZOLAM 0.5 MG/1
0.5 TABLET ORAL NIGHTLY PRN
Qty: 30 TABLET | Refills: 0 | Status: SHIPPED | OUTPATIENT
Start: 2023-10-17 | End: 2023-11-16

## 2023-10-17 RX ORDER — HYDROXYZINE HYDROCHLORIDE 25 MG/1
25 TABLET, FILM COATED ORAL NIGHTLY PRN
Qty: 30 TABLET | Refills: 0 | Status: SHIPPED | OUTPATIENT
Start: 2023-10-17 | End: 2023-11-09

## 2023-10-17 ASSESSMENT — ENCOUNTER SYMPTOMS
PALPITATIONS: 0
SHORTNESS OF BREATH: 0
ORTHOPNEA: 0
CHILLS: 0
COUGH: 0
FEVER: 0

## 2023-10-17 ASSESSMENT — FIBROSIS 4 INDEX: FIB4 SCORE: 0.7

## 2023-10-17 NOTE — PROGRESS NOTES
"Subjective:     CC: Establish Care (Swelling in right leg mostly at the end of the day for 2-3 months. Pt has been having a lot of anxiety, possible referral.)      HPI:   Diego is a 43 y.o. male who presents today for:    Leg swelling: this has been going on for 2-3 months. It is worse at the end of the day, and resolves over night, only to return the following day. Trace to 1 + edema noted today.     Anxiety: anxiety has been progressively getting worse. He has been using xanax to help with sleep. He has been out of this for a little while. PDMP reviewed. He reports episodes of severe depression. He saw a therapist when he was around age 16. Therapy was helpful, he would like to resume therapy. He has tried medication in the past, he did not like them due to the sexual dysfunction.       Allergies: Azithromycin     Medications:   Current Outpatient Medications:     hydrOXYzine HCl (ATARAX) 25 MG Tab, Take 1 Tablet by mouth at bedtime as needed for Anxiety., Disp: 30 Tablet, Rfl: 0    ALPRAZolam (XANAX) 0.5 MG Tab, Take 1 Tablet by mouth at bedtime as needed for Anxiety for up to 30 days. ICD-10 F4.9, Disp: 30 Tablet, Rfl: 0    albuterol 108 (90 Base) MCG/ACT Aero Soln inhalation aerosol, INHALE 2 PUFFS BY MOUTH EVERY FOUR HOURS AS NEEDED FOR SHORTNESS OF BREATH., Disp: 6.7 Each, Rfl: 1    omeprazole (PRILOSEC) 20 MG delayed-release capsule, Take 20 mg by mouth every day., Disp: , Rfl:       ROS:  Review of Systems   Constitutional:  Negative for chills and fever.   Respiratory:  Negative for cough and shortness of breath.    Cardiovascular:  Negative for chest pain, palpitations, orthopnea and leg swelling.       Objective:     Exam:  /62   Pulse 70   Temp 36.6 °C (97.9 °F) (Temporal)   Ht 1.854 m (6' 1\")   Wt 91.3 kg (201 lb 3.2 oz)   SpO2 97%   BMI 26.55 kg/m²  Body mass index is 26.55 kg/m².    Physical Exam  Constitutional:       Appearance: He is normal weight.   Eyes:      Pupils: Pupils are " equal, round, and reactive to light.   Cardiovascular:      Rate and Rhythm: Normal rate and regular rhythm.      Pulses: Normal pulses.      Heart sounds: Normal heart sounds.   Pulmonary:      Effort: Pulmonary effort is normal.      Breath sounds: Normal breath sounds.   Neurological:      Mental Status: He is alert and oriented to person, place, and time.   Psychiatric:         Mood and Affect: Mood normal.         Behavior: Behavior normal.           Assessment & Plan:     Diego farr 43 y.o. male with the following -     1. Anxiety  Chronic, unstable  He reports not using Xanax nightly for sleep.  He only uses it occasionally.  Yariel BAKER reviewed, fill was 8/11/2023.  Recommended that he use Xanax sparingly, breaking the pills in half would be preferable.  I would prefer that he use hydroxyzine, which she states is also been helpful.  - Referral to Psychology  - hydrOXYzine HCl (ATARAX) 25 MG Tab; Take 1 Tablet by mouth at bedtime as needed for Anxiety.  Dispense: 30 Tablet; Refill: 0  - URINE DRUG SCREEN; Future  - Consent for Opiate Prescription  - ALPRAZolam (XANAX) 0.5 MG Tab; Take 1 Tablet by mouth at bedtime as needed for Anxiety for up to 30 days. ICD-10 F4.9  Dispense: 30 Tablet; Refill: 0    2. Lower extremity edema  Acute, uncomplicated  -most likely due to standing on his feet all day at work.  Patient works as an .  Recommended that he monitor his salt intake in his diet, and use compression socks while at work to help with reducing swelling.    3. Benzodiazepine dependence (HCC)  - Consent for Opiate Prescription    4. Screening for endocrine, metabolic and immunity disorder  - TSH WITH REFLEX TO FT4; Future  - Comp Metabolic Panel; Future  - CBC WITHOUT DIFFERENTIAL; Future    5. Need for hepatitis C screening test  - HEP C VIRUS ANTIBODY; Future    6. Screening for cardiovascular condition  - Lipid Profile; Future        Anticipatory guidance included the following: Patient counseled  about skin care, diet, supplements, smoking, drugs/alcohol use, safe sex and exercise.     Return in about 3 months (around 1/17/2024), or if symptoms worsen or fail to improve.    Please note that this dictation was created using voice recognition software. I have made every reasonable attempt to correct obvious errors, but I expect that there are errors of grammar and possibly content that I did not discover before finalizing the note.

## 2023-10-25 ENCOUNTER — HOSPITAL ENCOUNTER (OUTPATIENT)
Dept: LAB | Facility: MEDICAL CENTER | Age: 43
End: 2023-10-25
Payer: COMMERCIAL

## 2023-10-25 DIAGNOSIS — Z13.0 SCREENING FOR ENDOCRINE, METABOLIC AND IMMUNITY DISORDER: ICD-10-CM

## 2023-10-25 DIAGNOSIS — Z13.29 SCREENING FOR ENDOCRINE, METABOLIC AND IMMUNITY DISORDER: ICD-10-CM

## 2023-10-25 DIAGNOSIS — Z11.59 NEED FOR HEPATITIS C SCREENING TEST: ICD-10-CM

## 2023-10-25 DIAGNOSIS — Z13.6 SCREENING FOR CARDIOVASCULAR CONDITION: ICD-10-CM

## 2023-10-25 DIAGNOSIS — Z13.228 SCREENING FOR ENDOCRINE, METABOLIC AND IMMUNITY DISORDER: ICD-10-CM

## 2023-10-25 LAB
ALBUMIN SERPL BCP-MCNC: 4.4 G/DL (ref 3.2–4.9)
ALBUMIN/GLOB SERPL: 1.6 G/DL
ALP SERPL-CCNC: 67 U/L (ref 30–99)
ALT SERPL-CCNC: 25 U/L (ref 2–50)
ANION GAP SERPL CALC-SCNC: 10 MMOL/L (ref 7–16)
AST SERPL-CCNC: 23 U/L (ref 12–45)
BILIRUB SERPL-MCNC: 0.5 MG/DL (ref 0.1–1.5)
BUN SERPL-MCNC: 17 MG/DL (ref 8–22)
CALCIUM ALBUM COR SERPL-MCNC: 9 MG/DL (ref 8.5–10.5)
CALCIUM SERPL-MCNC: 9.3 MG/DL (ref 8.5–10.5)
CHLORIDE SERPL-SCNC: 104 MMOL/L (ref 96–112)
CHOLEST SERPL-MCNC: 205 MG/DL (ref 100–199)
CO2 SERPL-SCNC: 25 MMOL/L (ref 20–33)
CREAT SERPL-MCNC: 1.08 MG/DL (ref 0.5–1.4)
ERYTHROCYTE [DISTWIDTH] IN BLOOD BY AUTOMATED COUNT: 43.6 FL (ref 35.9–50)
FASTING STATUS PATIENT QL REPORTED: NORMAL
GFR SERPLBLD CREATININE-BSD FMLA CKD-EPI: 88 ML/MIN/1.73 M 2
GLOBULIN SER CALC-MCNC: 2.7 G/DL (ref 1.9–3.5)
GLUCOSE SERPL-MCNC: 103 MG/DL (ref 65–99)
HCT VFR BLD AUTO: 48.4 % (ref 42–52)
HCV AB SER QL: NORMAL
HDLC SERPL-MCNC: 62 MG/DL
HGB BLD-MCNC: 16.1 G/DL (ref 14–18)
LDLC SERPL CALC-MCNC: 130 MG/DL
MCH RBC QN AUTO: 29.4 PG (ref 27–33)
MCHC RBC AUTO-ENTMCNC: 33.3 G/DL (ref 32.3–36.5)
MCV RBC AUTO: 88.3 FL (ref 81.4–97.8)
PLATELET # BLD AUTO: 299 K/UL (ref 164–446)
PMV BLD AUTO: 9.9 FL (ref 9–12.9)
POTASSIUM SERPL-SCNC: 4.6 MMOL/L (ref 3.6–5.5)
PROT SERPL-MCNC: 7.1 G/DL (ref 6–8.2)
RBC # BLD AUTO: 5.48 M/UL (ref 4.7–6.1)
SODIUM SERPL-SCNC: 139 MMOL/L (ref 135–145)
TRIGL SERPL-MCNC: 67 MG/DL (ref 0–149)
TSH SERPL DL<=0.005 MIU/L-ACNC: 1.69 UIU/ML (ref 0.38–5.33)
WBC # BLD AUTO: 5.6 K/UL (ref 4.8–10.8)

## 2023-10-25 PROCEDURE — 80053 COMPREHEN METABOLIC PANEL: CPT

## 2023-10-25 PROCEDURE — 85027 COMPLETE CBC AUTOMATED: CPT

## 2023-10-25 PROCEDURE — 84443 ASSAY THYROID STIM HORMONE: CPT

## 2023-10-25 PROCEDURE — 36415 COLL VENOUS BLD VENIPUNCTURE: CPT

## 2023-10-25 PROCEDURE — 86803 HEPATITIS C AB TEST: CPT

## 2023-10-25 PROCEDURE — 80061 LIPID PANEL: CPT

## 2023-11-09 DIAGNOSIS — F41.9 ANXIETY: ICD-10-CM

## 2023-11-09 RX ORDER — HYDROXYZINE HYDROCHLORIDE 25 MG/1
25 TABLET, FILM COATED ORAL NIGHTLY PRN
Qty: 90 TABLET | Refills: 2 | Status: SHIPPED | OUTPATIENT
Start: 2023-11-09

## 2023-12-28 ENCOUNTER — OFFICE VISIT (OUTPATIENT)
Dept: MEDICAL GROUP | Facility: MEDICAL CENTER | Age: 43
End: 2023-12-28
Payer: COMMERCIAL

## 2023-12-28 VITALS
HEIGHT: 73 IN | HEART RATE: 65 BPM | DIASTOLIC BLOOD PRESSURE: 60 MMHG | WEIGHT: 194.6 LBS | OXYGEN SATURATION: 97 % | SYSTOLIC BLOOD PRESSURE: 102 MMHG | TEMPERATURE: 97.8 F | BODY MASS INDEX: 25.79 KG/M2

## 2023-12-28 DIAGNOSIS — R60.0 BILATERAL LOWER EXTREMITY EDEMA: ICD-10-CM

## 2023-12-28 DIAGNOSIS — F41.9 ANXIETY: ICD-10-CM

## 2023-12-28 DIAGNOSIS — G89.29 CHRONIC RLQ PAIN: ICD-10-CM

## 2023-12-28 DIAGNOSIS — R10.31 CHRONIC RLQ PAIN: ICD-10-CM

## 2023-12-28 PROBLEM — U07.1 COVID-19 VIRUS INFECTION: Status: RESOLVED | Noted: 2023-03-01 | Resolved: 2023-12-28

## 2023-12-28 PROCEDURE — 99214 OFFICE O/P EST MOD 30 MIN: CPT

## 2023-12-28 PROCEDURE — 3074F SYST BP LT 130 MM HG: CPT

## 2023-12-28 PROCEDURE — 3078F DIAST BP <80 MM HG: CPT

## 2023-12-28 ASSESSMENT — ENCOUNTER SYMPTOMS
SHORTNESS OF BREATH: 0
FEVER: 0
COUGH: 0
PALPITATIONS: 0
CHILLS: 0
ORTHOPNEA: 0

## 2023-12-28 ASSESSMENT — FIBROSIS 4 INDEX: FIB4 SCORE: .6461538461538461538

## 2023-12-28 NOTE — PROGRESS NOTES
Subjective:     CC: Leg Swelling (Pt states he has been getting swelling in both of his legs and feet, pt states he has been wearing compression socks but it hasn't helped. Pt states it's worse at night than it is during the day.)      HPI:   THU is a 43 y.o. male who presents today for:     Bilateral lower extremity edema: He states that he has been having bilateral lower extremity edema in his lower legs and feet for the last several days.  He has been wearing compression socks, but does not feel that they are helping.  Swelling is progressively worse towards the end of the day, normally resolves by morning.  Today there is no edema, pain, erythema, warmth of his lower extremities.    Anxiety: He states that his anxiety has improved.  Only occasionally using hydroxyzine.  He has seen a counselor a few times, this has been helpful as well.    RLQ pain: this has been occurring intermittently since last year. Pain lasts roughly 20 minutes, occurring 3-4 times a week. It is worse in the morning. It is worse with pressure. He believe it may be worse after eating. He reports eating and drinking normally.  He did have a ultrasound of his appendix, and CT abdomen pelvis last year which showed no acute inflammatory changes to the abdomen or pelvis, normal appendix, right renal calculus, no hydronephrosis.  He also had an ultrasound of his kidneys, and did have right kidney stones states that he is voiding regularly, but does not drink enough water.    Allergies: Azithromycin     Medications:   Current Outpatient Medications:     hydrOXYzine HCl (ATARAX) 25 MG Tab, TAKE 1 TABLET BY MOUTH AT BEDTIME AS NEEDED FOR ANXIETY, Disp: 90 Tablet, Rfl: 2    albuterol 108 (90 Base) MCG/ACT Aero Soln inhalation aerosol, INHALE 2 PUFFS BY MOUTH EVERY FOUR HOURS AS NEEDED FOR SHORTNESS OF BREATH., Disp: 6.7 Each, Rfl: 1    omeprazole (PRILOSEC) 20 MG delayed-release capsule, Take 20 mg by mouth every day., Disp: , Rfl:  "      ROS:  Review of Systems   Constitutional:  Negative for chills and fever.   Respiratory:  Negative for cough and shortness of breath.    Cardiovascular:  Negative for chest pain, palpitations, orthopnea and leg swelling.       Objective:     Exam:  /60   Pulse 65   Temp 36.6 °C (97.8 °F) (Temporal)   Ht 1.854 m (6' 1\")   Wt 88.3 kg (194 lb 9.6 oz)   SpO2 97%   BMI 25.67 kg/m²  Body mass index is 25.67 kg/m².    Physical Exam  Constitutional:       Appearance: He is normal weight.   Eyes:      Pupils: Pupils are equal, round, and reactive to light.   Cardiovascular:      Rate and Rhythm: Normal rate and regular rhythm.      Pulses: Normal pulses.      Heart sounds: Normal heart sounds.   Pulmonary:      Effort: Pulmonary effort is normal.      Breath sounds: Normal breath sounds.   Abdominal:      General: Abdomen is flat. Bowel sounds are normal. There is no distension.      Palpations: Abdomen is soft. There is no mass.      Tenderness: There is abdominal tenderness. There is no guarding or rebound.      Hernia: No hernia is present.   Neurological:      Mental Status: He is alert and oriented to person, place, and time.   Psychiatric:         Mood and Affect: Mood normal.         Behavior: Behavior normal.           Assessment & Plan:     THU a 43 y.o. male with the following -     1. Bilateral lower extremity edema  Acute, uncomplicated  We discussed that this is most likely due to increased salt in his diet.  Recommended that he increase hydration, and monitor salt intake.  We also discussed increasing the pressure of his compression socks, as he works as an  and is on his feet all day.  We discussed elevating his feet in the evening time, to help with edema.  No sign of cellulitis, DVT today.    2. Anxiety  Chronic, stable  Doing better today.  Only using hydroxyzine occasionally.  Planning to continue to follow-up with therapy.    3. Chronic RLQ pain  Undiagnosed problem with " uncertain prognosis  We discussed the possibility of a hernia, signs and symptoms of appendicitis, cholecystitis and constipation.  Given that symptoms are intermittent, and short lasting he would like to monitor symptoms for now.  Patient instructed to monitor symptoms more closely, ER precautions discussed.  Normal abdominal exam today.  If symptoms worsen follow-up in clinic.        Anticipatory guidance included the following: Patient counseled about skin care, diet, supplements, smoking, drugs/alcohol use, safe sex and exercise.     Return if symptoms worsen or fail to improve.    Please note that this dictation was created using voice recognition software. I have made every reasonable attempt to correct obvious errors, but I expect that there are errors of grammar and possibly content that I did not discover before finalizing the note.

## 2024-01-04 ENCOUNTER — OFFICE VISIT (OUTPATIENT)
Dept: MEDICAL GROUP | Facility: MEDICAL CENTER | Age: 44
End: 2024-01-04
Payer: COMMERCIAL

## 2024-01-04 VITALS
TEMPERATURE: 98.2 F | SYSTOLIC BLOOD PRESSURE: 112 MMHG | HEART RATE: 91 BPM | OXYGEN SATURATION: 95 % | HEIGHT: 73 IN | BODY MASS INDEX: 26.35 KG/M2 | WEIGHT: 198.8 LBS | DIASTOLIC BLOOD PRESSURE: 62 MMHG

## 2024-01-04 DIAGNOSIS — M25.562 CHRONIC PAIN OF BOTH KNEES: ICD-10-CM

## 2024-01-04 DIAGNOSIS — G89.29 CHRONIC PAIN OF BOTH KNEES: ICD-10-CM

## 2024-01-04 DIAGNOSIS — M25.561 CHRONIC PAIN OF BOTH KNEES: ICD-10-CM

## 2024-01-04 DIAGNOSIS — F41.9 ANXIETY: ICD-10-CM

## 2024-01-04 PROCEDURE — 99214 OFFICE O/P EST MOD 30 MIN: CPT

## 2024-01-04 PROCEDURE — 3074F SYST BP LT 130 MM HG: CPT

## 2024-01-04 PROCEDURE — 3078F DIAST BP <80 MM HG: CPT

## 2024-01-04 RX ORDER — ALPRAZOLAM 0.25 MG/1
0.25 TABLET ORAL NIGHTLY PRN
Qty: 30 TABLET | Refills: 0 | Status: SHIPPED | OUTPATIENT
Start: 2024-01-04 | End: 2024-02-03

## 2024-01-04 ASSESSMENT — FIBROSIS 4 INDEX: FIB4 SCORE: .6461538461538461538

## 2024-01-04 ASSESSMENT — ENCOUNTER SYMPTOMS
FEVER: 0
COUGH: 0
ORTHOPNEA: 0
PALPITATIONS: 0
SHORTNESS OF BREATH: 0
CHILLS: 0

## 2024-01-04 ASSESSMENT — PATIENT HEALTH QUESTIONNAIRE - PHQ9: CLINICAL INTERPRETATION OF PHQ2 SCORE: 0

## 2024-01-04 NOTE — PROGRESS NOTES
"Subjective:     CC: Follow-Up (Pt states he has been having left knee/leg pain for about a week.)      HPI:   THU is a 43 y.o. male who presents today for:    bilateral knee pain: Patient states he has been having bilateral knee pain for years. The left knee pain has been worse for approximately week.  He denies any recent injury. 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 Right knee pain is along the medial aspect of the knee. Left knee pain is posterior. He states that sometimes he feels a lump behind his knee. No obviously palpable lump today.     Anxiety: anxiety has been stable. He has been using xanax to help with sleep. He states he is almost out of the prescription he received last October. PDMP reviewed. he is requesting a refill today.      Allergies: Azithromycin     Medications:   Current Outpatient Medications:     ALPRAZolam (XANAX) 0.25 MG Tab, Take 1 Tablet by mouth at bedtime as needed for Anxiety for up to 30 days., Disp: 30 Tablet, Rfl: 0    hydrOXYzine HCl (ATARAX) 25 MG Tab, TAKE 1 TABLET BY MOUTH AT BEDTIME AS NEEDED FOR ANXIETY, Disp: 90 Tablet, Rfl: 2    albuterol 108 (90 Base) MCG/ACT Aero Soln inhalation aerosol, INHALE 2 PUFFS BY MOUTH EVERY FOUR HOURS AS NEEDED FOR SHORTNESS OF BREATH., Disp: 6.7 Each, Rfl: 1    omeprazole (PRILOSEC) 20 MG delayed-release capsule, Take 20 mg by mouth every day., Disp: , Rfl:       ROS:  Review of Systems   Constitutional:  Negative for chills and fever.   Respiratory:  Negative for cough and shortness of breath.    Cardiovascular:  Negative for chest pain, palpitations, orthopnea and leg swelling.       Objective:     Exam:  /62   Pulse 91   Temp 36.8 °C (98.2 °F) (Temporal)   Ht 1.854 m (6' 1\")   Wt 90.2 kg (198 lb 12.8 oz)   SpO2 95%   BMI 26.23 kg/m²  Body mass index is 26.23 kg/m².    Physical Exam  Constitutional:       Appearance: He is normal weight.   Eyes:      Pupils: Pupils are equal, round, and reactive to " light.   Cardiovascular:      Rate and Rhythm: Normal rate and regular rhythm.      Pulses: Normal pulses.      Heart sounds: Normal heart sounds.   Pulmonary:      Effort: Pulmonary effort is normal.      Breath sounds: Normal breath sounds.   Neurological:      Mental Status: He is alert and oriented to person, place, and time.   Psychiatric:         Mood and Affect: Mood normal.         Behavior: Behavior normal.           Assessment & Plan:     THU a 43 y.o. male with the following -     1. Chronic pain of both knees  Chronic, unstable  Xray of his right knee last year was normal. Concern for possible right MCL injury. Left knee pain may be due to arthritis. He has had an ACL repair on the left in the past.   - Referral to Physical Therapy  - Referral to Orthopedics    2. Anxiety  Chronic, stable  PDMP reviewed, no signs of misuse.  - ALPRAZolam (XANAX) 0.25 MG Tab; Take 1 Tablet by mouth at bedtime as needed for Anxiety for up to 30 days.  Dispense: 30 Tablet; Refill: 0        Anticipatory guidance included the following: Patient counseled about skin care, diet, supplements, smoking, drugs/alcohol use, safe sex and exercise.     Return in about 8 weeks (around 2/29/2024), or if symptoms worsen or fail to improve.    Please note that this dictation was created using voice recognition software. I have made every reasonable attempt to correct obvious errors, but I expect that there are errors of grammar and possibly content that I did not discover before finalizing the note.

## 2024-01-14 ENCOUNTER — OFFICE VISIT (OUTPATIENT)
Dept: URGENT CARE | Facility: CLINIC | Age: 44
End: 2024-01-14
Payer: COMMERCIAL

## 2024-01-14 VITALS
BODY MASS INDEX: 25.84 KG/M2 | DIASTOLIC BLOOD PRESSURE: 80 MMHG | SYSTOLIC BLOOD PRESSURE: 138 MMHG | OXYGEN SATURATION: 98 % | RESPIRATION RATE: 14 BRPM | TEMPERATURE: 97.2 F | HEIGHT: 73 IN | HEART RATE: 61 BPM | WEIGHT: 195 LBS

## 2024-01-14 DIAGNOSIS — H60.332 ACUTE SWIMMER'S EAR OF LEFT SIDE: ICD-10-CM

## 2024-01-14 PROCEDURE — 99213 OFFICE O/P EST LOW 20 MIN: CPT | Performed by: PHYSICIAN ASSISTANT

## 2024-01-14 PROCEDURE — 3075F SYST BP GE 130 - 139MM HG: CPT | Performed by: PHYSICIAN ASSISTANT

## 2024-01-14 PROCEDURE — 3079F DIAST BP 80-89 MM HG: CPT | Performed by: PHYSICIAN ASSISTANT

## 2024-01-14 RX ORDER — NEOMYCIN SULFATE, POLYMYXIN B SULFATE AND HYDROCORTISONE 10; 3.5; 1 MG/ML; MG/ML; [USP'U]/ML
4 SUSPENSION/ DROPS AURICULAR (OTIC) 4 TIMES DAILY
Qty: 16 ML | Refills: 0 | Status: SHIPPED | OUTPATIENT
Start: 2024-01-14 | End: 2024-01-24

## 2024-01-14 ASSESSMENT — ENCOUNTER SYMPTOMS
CONSTIPATION: 0
SHORTNESS OF BREATH: 0
SORE THROAT: 0
VOMITING: 0
CHILLS: 0
FEVER: 0
DIARRHEA: 0
MYALGIAS: 0
ABDOMINAL PAIN: 0
HEADACHES: 0
NAUSEA: 0
EYE PAIN: 0
COUGH: 0

## 2024-01-14 ASSESSMENT — FIBROSIS 4 INDEX: FIB4 SCORE: .6461538461538461538

## 2024-01-14 NOTE — PROGRESS NOTES
"Subjective:   THU EASTON is a 43 y.o. male who presents for Otalgia (Possible left ear infection )      43-year-old male notes mild left-sided ear discomfort especially with palpation over the last 3 days.  He has not noted any tinnitus or decreased hearing.  No history of recurrent ear infections.  No recent barotrauma.    Review of Systems   Constitutional:  Negative for chills and fever.   HENT:  Positive for ear pain. Negative for congestion, ear discharge, hearing loss, sore throat and tinnitus.    Eyes:  Negative for pain.   Respiratory:  Negative for cough and shortness of breath.    Cardiovascular:  Negative for chest pain.   Gastrointestinal:  Negative for abdominal pain, constipation, diarrhea, nausea and vomiting.   Genitourinary:  Negative for dysuria.   Musculoskeletal:  Negative for myalgias.   Skin:  Negative for rash.   Neurological:  Negative for headaches.       Medications, Allergies, and current problem list reviewed today in Epic.     Objective:     /80 (BP Location: Left arm, Patient Position: Sitting, BP Cuff Size: Adult)   Pulse 61   Temp 36.2 °C (97.2 °F) (Temporal)   Resp 14   Ht 1.854 m (6' 1\")   Wt 88.5 kg (195 lb)   SpO2 98%     Physical Exam  Vitals reviewed.   Constitutional:       Appearance: Normal appearance.   HENT:      Head: Normocephalic and atraumatic.      Right Ear: External ear normal.      Left Ear: External ear normal.      Ears:      Comments: Mildly edematous some nonmacerated nonerythematous left canal.  TMs are pearly gray, right canal normal     Nose: Nose normal.      Mouth/Throat:      Mouth: Mucous membranes are moist.   Eyes:      Conjunctiva/sclera: Conjunctivae normal.   Cardiovascular:      Rate and Rhythm: Normal rate.   Pulmonary:      Effort: Pulmonary effort is normal.   Skin:     General: Skin is warm and dry.      Capillary Refill: Capillary refill takes less than 2 seconds.   Neurological:      Mental Status: He is alert and oriented " to person, place, and time.         Assessment/Plan:     Diagnosis and associated orders:     1. Acute swimmer's ear of left side  neomycin-polymyxin-HC (PEDIOTIC HC) 3.5-64940-7 Suspension         Comments/MDM:     Minimal irritation of the left canal, tenderness with manipulation of the tragus and pinna.  Recommend allowing ears to dry completely after showering, avoid any in ear devices such as headphones or earplugs.  Trial of Pediotic drops.  Follow-up if not improving         Differential diagnosis, natural history, supportive care, and indications for immediate follow-up discussed.    Advised the patient to follow-up with the primary care physician for recheck, reevaluation, and consideration of further management.    Please note that this dictation was created using voice recognition software. I have made a reasonable attempt to correct obvious errors, but I expect that there are errors of grammar and possibly content that I did not discover before finalizing the note.    This note was electronically signed by Lefty Hagen PA-C

## 2024-02-20 ENCOUNTER — APPOINTMENT (OUTPATIENT)
Dept: ADMISSIONS | Facility: MEDICAL CENTER | Age: 44
End: 2024-02-20
Attending: ORTHOPAEDIC SURGERY
Payer: COMMERCIAL

## 2024-02-20 NOTE — OP THERAPY EVALUATION
REPORT OF ROUTINE EEG WITH VIDEO    [facility]: [address], [address] [place] [address], Phone: [phone]  [facility]: [address] [place] [address], [place] [address], Phone: [phone]  Office: [address] [place] [address], [place] [address], Phone: [phone]    Patient Name: Mirna Gooden    Age: 78 year  : 1945    EEG #: [id number]  Study Date: 2024   Start Time: 9:48:04 AM     Study Duration: 21.8  		    Technical Information:					  On Instrument: [id number]  Placement and Labeling of Electrodes:  The EEG was performed utilizing 20 channels referential EEG connections (coronal over temporal over parasagittal montage) using all standard 10-20 electrode placements with EKG.  Recording was at a sampling rate of 256 samples per second per channel.  Time synchronized digital video recording was done simultaneously with EEG recording.  A low light infrared camera was used for low light recording.  Kee and seizure detection algorithms were utilized.  CSA Technical Component:  Quantitative EEG analysis using a separate Compressed Spectral Array (CSA) software package was conducted in real-time and run at bedside after set up by the technician, digitally displaying the power of electrographic frequencies included in the 1-30Hz band using a graded color map.  This data was reviewed and interpreted independently, and is reported in a separate section below.    History:  78 year old Female is here for evaluation of seizures      Medication  Synthroid    Lipitor    Lopressor      Study Interpretation:    FINDINGS:  The background was continuous, spontaneously variable and reactive.  During wakefulness, the posteriorly dominant rhythm consisted of symmetric, well modulated up to 10-11 Hz activity, with an amplitude to 30 uV, that attenuated to eye opening.  Low amplitude central beta was noted in wakefulness.    Background Slowing:  Generalized slowing: none was present.  Focal slowing: none was present.    Sleep Background:  -Drowsiness was characterized by fragmentation, attenuation, and slowing of the background activity.    -Stage II sleep transients were not recorded.    Non-epileptiform activity:  None.    Epileptiform Activity:   No epileptiform discharges were present.    Events:  No clinical events were recorded.  No seizures were recorded.    Activation Procedures:   -Hyperventilation was not performed.    -Photic stimulation was performed and did not elicit any abnormalities.      Artifacts:  Intermittent myogenic and movement artifacts were noted.    ECG:  The heart rate on single channel ECG was predominantly irregular rhythm.    EEG Classification / Summary:    Normal EEG in the awake, and drowsy states.    Clinical Impression:    Normal EEG in the awake, and drowsy states.    No epileptiform pattern or seizure recorded.    ________________________________________    Fellow, [facility] [place]       ________________________	  ELICEO Garcia  Attending Physician, [facility] [place]    ------------------------------------  EEG Reading Room: [phone]  On Call Service After Hours: [phone]      		       Outpatient Physical Therapy  INITIAL EVALUATION    [facility] [address]  [address]  [facility] [address]  Phone:  [phone]  Fax:  [phone]    Date of Evaluation: 2018    Patient: Diego Abarca  YOB: 1980  MRN: [id number]     Referring Provider: Brooklynn Gonzales D.O.  [address] [place] [address]   Referring Diagnosis Pain in right knee [M25.561]     Time Calculation  Start time: 730  Stop time: 830 Time Calculation (min): 60 minutes     Physical Therapy Occurrence Codes    Date physical therapy care plan established or reviewed:  18   Date physical therapy treatment started:  18          Chief Complaint: Knee Problem    Visit Diagnoses     ICD-10-CM   1. Acute pain of right knee M25.561         Subjective:   History of Present Illness:     Mechanism of injury:  Pt presents to PT with new onset of pain in the R knee.  Started about 2-3 mo ago initially on the inner part of the knee when he was bending it too far or coming up from a low position.  Now has progressed to the lateral part of the knee.  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  Denies N/T.  PMH of arthroscopic surgery on the L, but no injury to the R.    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   Sleep disturbance:  Interrupted sleep  Pain:     Current pain ratin    At best pain ratin    At worst pain ratin    Quality:  Sharp    Pain timing:  When active    Relieving factors:  Rest (neoprene sleeve)    Progression:  Improving  Social Support:     Lives in:  Multiple-level home    Lives with:  Young children and spouse  Treatments:     None    Patient Goals:     Patient goals for therapy:  Return to work, return to sport/leisure activities, increased strength, decreased pain and increased motion      Past Medical History:   Diagnosis Date   • Alpha     REPORT OF ROUTINE EEG WITH VIDEO    [facility]: [address], [address] [place] [address], Phone: [phone]  [facility]: [address] [place] [address], [place] [address], Phone: [phone]  Office: [address] [place] [address], [place] [address], Phone: [phone]    Patient Name: Mirna Gooden    Age: 78 year  : 1945    EEG #: [id number]  Study Date: 2024   Start Time: 9:48:04 AM     Study Duration: 21.8  		    Technical Information:					  On Instrument: [id number]  Placement and Labeling of Electrodes:  The EEG was performed utilizing 20 channels referential EEG connections (coronal over temporal over parasagittal montage) using all standard 10-20 electrode placements with EKG.  Recording was at a sampling rate of 256 samples per second per channel.  Time synchronized digital video recording was done simultaneously with EEG recording.  A low light infrared camera was used for low light recording.  Kee and seizure detection algorithms were utilized.  CSA Technical Component:  Quantitative EEG analysis using a separate Compressed Spectral Array (CSA) software package was conducted in real-time and run at bedside after set up by the technician, digitally displaying the power of electrographic frequencies included in the 1-30Hz band using a graded color map.  This data was reviewed and interpreted independently, and is reported in a separate section below.    History:  78 year old Female is here for evaluation of seizures      Medication  Synthroid    Lipitor    Lopressor      Study Interpretation:    FINDINGS:  The background was continuous, spontaneously variable and reactive.  During wakefulness, the posteriorly dominant rhythm consisted of symmetric, well modulated up to 10-11 Hz activity, with an amplitude to 30 uV, that attenuated to eye opening.  Low amplitude central beta was noted in wakefulness.    Background Slowing:  Generalized slowing: none was present.  Focal slowing: none was present.    Sleep Background:  -Drowsiness was characterized by fragmentation, attenuation, and slowing of the background activity.    -Stage II sleep transients were not recorded.    Non-epileptiform activity:  None.    Epileptiform Activity:   No epileptiform discharges were present.    Events:  No clinical events were recorded.  No seizures were recorded.    Activation Procedures:   -Hyperventilation was not performed.    -Photic stimulation was performed and did not elicit any abnormalities.      Artifacts:  Intermittent myogenic and movement artifacts were noted.    ECG:  The heart rate on single channel ECG was predominantly irregular rhythm.    EEG Classification / Summary:    Normal EEG in the awake, and drowsy states.    Clinical Impression:    Normal EEG in the awake, and drowsy states.    No epileptiform pattern or seizure recorded.    Irregular heart rhythm noted on EKG    ________________________________________         ________________________	  ELICEO Garcia  Attending Physician, [facility]    ------------------------------------  EEG Reading Room: [phone]  On Call Service After Hours: [phone]      		     "1-antitrypsin PiMS phenotype    • Chickenpox    • Kiswahili measles    • Mumps      Past Surgical History:   Procedure Laterality Date   • OTHER ORTHOPEDIC SURGERY       Social History   Substance Use Topics   • Smoking status: Never Smoker   • Smokeless tobacco: Never Used   • Alcohol use Yes      Comment: rarely     Family and Occupational History     Social History   • Marital status:      Spouse name: N/A   • Number of children: N/A   • Years of education: N/A       Objective     Neurological Testing     Sensation     Knee   Left Knee   Intact: light touch    Right Knee   Intact: light touch     Reflexes   Left   Patellar (L4): normal (2+)    Right   Patellar (L4): normal (2+)    Palpation     Right   Tenderness of the distal biceps femoris, lateral gastrocnemius and vastus medialis.     Tenderness     Right Knee   Tenderness in the LCL (distal).     Active Range of Motion   Left Knee   Normal active range of motion    Right Knee   Normal active range of motion    Additional Active Range of Motion Details  Painful lateral HS insertion to OP during flexion    Patellar Static Positioning   Left Knee: WFL  Right Knee: WFL    Additional Patellar Static Positioning Details  Increased patellar crepitus with R knee ROM compared to L.     Patellar Mobility   Left Knee Patellar tendons within functional limits include the medial, lateral, superior and inferior.     Right Knee Patellar tendons within functional limits include the medial, lateral and superior.     Strength:      Additional Strength Details  B LE strength 5/5 except hip abd 4/5 and R knee flexion at EOR flex painful and = 4-/5    Tests     Right Knee   Positive patellar compression.   Negative anterior drawer, lateral Genesis, medial Genesis, valgus stress test at 0 degrees and varus stress test at 0 degrees.         Therapeutic Exercises (CPT 35375):     1. Squat re-ed through STS, x 5 min    2. Ball bridge, x 20 with 5\" hold    3. Ball eccentric HS "    REPORT OF ROUTINE EEG WITH VIDEO    [facility]: [address], [address] [place] [address], Phone: [phone]  [facility]: [address] [place] [address], [place] [address], Phone: [phone]  Office: [address] [place] [address], [place] [address], Phone: [phone]    Patient Name: Mirna Gooden    Age: 78 year  : 1945    EEG #: [id number]  Study Date: 2024   Start Time: 9:48:04 AM     Study Duration: 21.8  		    Technical Information:					  On Instrument: [id number]  Placement and Labeling of Electrodes:  The EEG was performed utilizing 20 channels referential EEG connections (coronal over temporal over parasagittal montage) using all standard 10-20 electrode placements with EKG.  Recording was at a sampling rate of 256 samples per second per channel.  Time synchronized digital video recording was done simultaneously with EEG recording.  A low light infrared camera was used for low light recording.  eKe and seizure detection algorithms were utilized.  CSA Technical Component:  Quantitative EEG analysis using a separate Compressed Spectral Array (CSA) software package was conducted in real-time and run at bedside after set up by the technician, digitally displaying the power of electrographic frequencies included in the 1-30Hz band using a graded color map.  This data was reviewed and interpreted independently, and is reported in a separate section below.    History:  78 year old Female with history of syncope is here to rule out seizures      Medication  Synthroid    Lipitor    Lopressor      Study Interpretation:    FINDINGS:  The background was continuous, spontaneously variable and reactive.  During wakefulness, the posteriorly dominant rhythm consisted of symmetric, well modulated up to 10-11 Hz activity, with an amplitude to 30 uV, that attenuated to eye opening.  Low amplitude central beta was noted in wakefulness.    Background Slowing:  Generalized slowing: none was present.  Focal slowing: none was present.    Sleep Background:  -Drowsiness was characterized by fragmentation, attenuation, and slowing of the background activity.    -Stage II sleep transients were not recorded.    Non-epileptiform activity:  None.    Epileptiform Activity:   No epileptiform discharges were present.    Events:  No clinical events were recorded.  No seizures were recorded.    Activation Procedures:   -Hyperventilation was not performed.    -Photic stimulation was performed and did not elicit any abnormalities.      Artifacts:  Intermittent myogenic and movement artifacts were noted.    ECG:  The heart rate on single channel ECG was predominantly irregular rhythm.    EEG Classification / Summary:    Normal EEG in the awake, and drowsy states.    Clinical Impression:    Normal EEG in the awake, and drowsy states.    No epileptiform pattern or seizure recorded.    Irregular heart rhythm noted on EKG    ________________________________________         ________________________	  ELICEO Garcia  Attending Physician, [facility] [place]    ------------------------------------  EEG Reading Room: [phone]  On Call Service After Hours: [phone]      		     curl, x 10 reps      Therapeutic Exercise Summary: HO provided for above program.       Time-based treatments/modalities:  Therapeutic exercise minutes (CPT 34064): 15 minutes       Assessment, Response and Plan:   Impairments: abnormal or restricted ROM, activity intolerance, difficulty performing job, impaired physical strength, lacks appropriate home exercise program and pain with function    Assessment details:  Mr. Abarca is a 38 y.o male who presents to PT with complaint of R knee pain of 2-3 mo duration.  PT evaluation is consistent with a distal lateral hamstring strain.  He is painful to EOR loading of the HS, as well as direct palpation.  His squat mechanics are poor and may be a contributing factor to the current problem.  He is having to modify work activity due to the pain.  Skilled PT services are indicated to address the mentioned functional limitations and enhance QOL.     Prognosis: good    Goals:   Short Term Goals:   - Improve R knee flexion to no pain with OP  - Able to perform 10 eccent HS curls without pain  - Indep with squat .   Short term goal time span:  2-4 weeks      Long Term Goals:    - Improve WOMAC at least 10 pts  - Able to work without increased pain  - Indep with HEP  Long term goal time span:  6-8 weeks    Plan:   Therapy options:  Physical therapy treatment to continue  Planned therapy interventions:  E Stim Unattended (CPT 01638), Functional Training, Self Care (CPT 42157), Gait Training (CPT 31178), Manual Therapy (CPT 23369), Neuromuscular Re-education (CPT 13678), Therapeutic Activities (CPT 85402) and Therapeutic Exercise (CPT 45913)  Frequency:  2x week  Duration in weeks:  6  Discussed with:  Patient      Functional Limitation G-Codes and Severity Modifiers  WOMAC Grand Total: 56.25     Referring provider co-signature:  I have reviewed this plan of care and my co-signature certifies the need for services.  Certification Dates:   From 9/14/18     To  10/26/18    Physician Signature: ________________________________ Date: ______________

## 2024-02-27 ENCOUNTER — PRE-ADMISSION TESTING (OUTPATIENT)
Dept: ADMISSIONS | Facility: MEDICAL CENTER | Age: 44
End: 2024-02-27
Attending: ORTHOPAEDIC SURGERY
Payer: COMMERCIAL

## 2024-02-27 VITALS — HEIGHT: 73 IN | BODY MASS INDEX: 25.73 KG/M2

## 2024-02-27 RX ORDER — ACETAMINOPHEN/DIPHENHYDRAMINE 500MG-25MG
2 TABLET ORAL NIGHTLY PRN
Status: ON HOLD | COMMUNITY
End: 2024-04-03

## 2024-02-27 RX ORDER — FLUOXETINE HYDROCHLORIDE 20 MG/1
1 CAPSULE ORAL EVERY MORNING
COMMUNITY
End: 2024-03-15

## 2024-02-27 RX ORDER — ARIPIPRAZOLE 5 MG/1
1 TABLET ORAL
COMMUNITY
End: 2024-03-15

## 2024-02-27 RX ORDER — PRAZOSIN HYDROCHLORIDE 1 MG/1
1 CAPSULE ORAL
COMMUNITY
End: 2024-03-15

## 2024-02-27 RX ORDER — DIPHENHYDRAMINE HCL, IBUPROFEN 25; 200 MG/1; MG/1
2 CAPSULE, LIQUID FILLED ORAL NIGHTLY PRN
Status: ON HOLD | COMMUNITY
End: 2024-04-03

## 2024-02-27 NOTE — PREPROCEDURE INSTRUCTIONS
Pre-admit telephone appointment completed. Reviewed the Preparing for your procedure handout with patient over the phone.  Patient instructed per pharmacy guidelines regarding taking, holding, or contacting provider for instructions on regularly prescribed medications before surgery. Instructed to take the following medications the day of surgery with a sip of water per pharmacy medication guidelines: omeprazole, and if needed-albuterol.

## 2024-03-15 ENCOUNTER — OFFICE VISIT (OUTPATIENT)
Dept: MEDICAL GROUP | Facility: MEDICAL CENTER | Age: 44
End: 2024-03-15
Payer: COMMERCIAL

## 2024-03-15 VITALS
HEART RATE: 67 BPM | OXYGEN SATURATION: 96 % | HEIGHT: 73 IN | DIASTOLIC BLOOD PRESSURE: 62 MMHG | WEIGHT: 200.2 LBS | BODY MASS INDEX: 26.53 KG/M2 | TEMPERATURE: 97 F | SYSTOLIC BLOOD PRESSURE: 110 MMHG

## 2024-03-15 DIAGNOSIS — L91.8 SKIN TAG: ICD-10-CM

## 2024-03-15 DIAGNOSIS — M65.331 TRIGGER MIDDLE FINGER OF RIGHT HAND: ICD-10-CM

## 2024-03-15 DIAGNOSIS — Z11.4 ENCOUNTER FOR SCREENING FOR HIV: ICD-10-CM

## 2024-03-15 DIAGNOSIS — M79.646 PAIN OF FINGER, UNSPECIFIED LATERALITY: ICD-10-CM

## 2024-03-15 PROCEDURE — 3078F DIAST BP <80 MM HG: CPT

## 2024-03-15 PROCEDURE — 3074F SYST BP LT 130 MM HG: CPT

## 2024-03-15 PROCEDURE — 99214 OFFICE O/P EST MOD 30 MIN: CPT

## 2024-03-15 ASSESSMENT — ENCOUNTER SYMPTOMS
PALPITATIONS: 0
CHILLS: 0
FEVER: 0
SHORTNESS OF BREATH: 0
COUGH: 0
ORTHOPNEA: 0

## 2024-03-15 ASSESSMENT — FIBROSIS 4 INDEX: FIB4 SCORE: .6461538461538461538

## 2024-03-15 NOTE — PROGRESS NOTES
"Verbal consent was acquired by the patient to use [email or web address] ambient listening note generation during this visit       Subjective:    CC: Follow-Up (Pt states he has been having pain in his right middle finger with swelling and pain going into his hand. Pt has spots of concerns under his right eye.)        Allergies: Azithromycin     Medications:   Current Outpatient Medications:     Ibuprofen-diphenhydrAMINE HCl (ADVIL PM) 200-25 MG Cap, Take 2 Capsules by mouth at bedtime as needed., Disp: , Rfl:     diphenhydrAMINE-APAP, sleep, (TYLENOL PM EXTRA STRENGTH)  MG Tab, Take 2 Tablets by mouth at bedtime as needed., Disp: , Rfl:     hydrOXYzine HCl (ATARAX) 25 MG Tab, TAKE 1 TABLET BY MOUTH AT BEDTIME AS NEEDED FOR ANXIETY, Disp: 90 Tablet, Rfl: 2    albuterol 108 (90 Base) MCG/ACT Aero Soln inhalation aerosol, INHALE 2 PUFFS BY MOUTH EVERY FOUR HOURS AS NEEDED FOR SHORTNESS OF BREATH., Disp: 6.7 Each, Rfl: 1    omeprazole (PRILOSEC) 20 MG delayed-release capsule, Take 20 mg by mouth every day., Disp: , Rfl:     History of Present Illness  The patient is a 43-year-old male who presents for evaluation of multiple medical concerns.    Finger pain/trigger finger: His right middle finger pain has been worse over the last 4 to 5 days. He is unsure if it is due to the weather, but he has had more pain on days when the weather is cold.  Yesterday morning, he had severe pain. He can not touch his other finger or straighten it. He has a small lump on the lateral side of the third digit. Most of the pain is in the medial joint of the digit. He sprained it years ago and broke the skin open when he had something. He has not had pain like this for at least a year. He thinks it is arthritic. It is very tender to the touch. It was red yesterday. He believes that he has had gout in the past. He feels like there is a rubber band in his finger and it gets stuck and it has to \"shoot out\".  He states that the pain has improved this " "morning.    Skin tags: He has skin tags. He has one near where his eye.  He states that they are annoying. They seem like they have grown in the last year.  He would like to have them removed.          Objective:  /62   Pulse 67   Temp 36.1 °C (97 °F) (Temporal)   Ht 1.854 m (6' 1\")   Wt 90.8 kg (200 lb 3.2 oz)   SpO2 96%   BMI 26.41 kg/m² , Body mass index is 26.41 kg/m².    Physical:  Physical Exam  Constitutional:       Appearance: He is normal weight.   Eyes:      Pupils: Pupils are equal, round, and reactive to light.   Pulmonary:      Effort: Pulmonary effort is normal.   Musculoskeletal:      Comments: Swelling of the third digit right hand.  He does have a wart on the top of the right third digit.   Neurological:      Mental Status: He is alert and oriented to person, place, and time.   Psychiatric:         Mood and Affect: Mood normal.         Behavior: Behavior normal.           ROS:  Review of Systems   Constitutional:  Negative for chills and fever.   Respiratory:  Negative for cough and shortness of breath.    Cardiovascular:  Negative for chest pain, palpitations, orthopnea and leg swelling.             Assessment & Plan    1. Pain of finger, unspecified laterality  2. Trigger middle finger of right hand  Chronic, unstable  He states that he has had this pain happened before several years ago.  Occasionally he will feel his right third digit get stuck and he has to \"fling it out to release it.\"  Swelling and pain has improved since yesterday.  Will hold off on Medrol Dosepak.  We discussed the possibility of arthritis versus gout.  Will check uric acid level.  He believes he has a remote history of gout in his past.  Continue using Tylenol or ibuprofen as needed for pain management.  Notify the office if symptoms do not improve.  - URIC ACID; Future    3. Skin tag  Chronic, stable  He states he has several growths on his face near his eyes and some on his chest under his right and left arm. "  He is hoping to have them removed.  - Referral to Dermatology    4. Encounter for screening for HIV  - HIV AG/AB COMBO ASSAY SCREENING; Future    Follow-up  He will follow up as needed.      I spent a total of 20 minutes which included time preparing to see the patient (reviewing my last note, records and recent lab results)  I also performed a medically appropriate examination, counseled and educated the patient, ordered medications and tests. Also referred the patient to other healthcare professionals.        This note was created using voice recognition software (Dragon). The accuracy of the dictation is limited by the abilities of the software. I have reviewed the note prior to signing, however some errors in grammar and context are still possible. If you have any questions related to this note please do not hesitate to contact our office.

## 2024-04-02 ENCOUNTER — ANESTHESIA EVENT (OUTPATIENT)
Dept: SURGERY | Facility: MEDICAL CENTER | Age: 44
End: 2024-04-02
Payer: COMMERCIAL

## 2024-04-03 ENCOUNTER — ANESTHESIA (OUTPATIENT)
Dept: SURGERY | Facility: MEDICAL CENTER | Age: 44
End: 2024-04-03
Payer: COMMERCIAL

## 2024-04-03 ENCOUNTER — HOSPITAL ENCOUNTER (OUTPATIENT)
Facility: MEDICAL CENTER | Age: 44
End: 2024-04-03
Attending: ORTHOPAEDIC SURGERY | Admitting: ORTHOPAEDIC SURGERY
Payer: COMMERCIAL

## 2024-04-03 VITALS
RESPIRATION RATE: 17 BRPM | TEMPERATURE: 97 F | DIASTOLIC BLOOD PRESSURE: 91 MMHG | HEIGHT: 73 IN | OXYGEN SATURATION: 99 % | SYSTOLIC BLOOD PRESSURE: 147 MMHG | BODY MASS INDEX: 26.27 KG/M2 | HEART RATE: 51 BPM | WEIGHT: 198.19 LBS

## 2024-04-03 PROCEDURE — 160027 HCHG SURGERY MINUTES - 1ST 30 MINS LEVEL 2: Performed by: ORTHOPAEDIC SURGERY

## 2024-04-03 PROCEDURE — 700101 HCHG RX REV CODE 250: Performed by: STUDENT IN AN ORGANIZED HEALTH CARE EDUCATION/TRAINING PROGRAM

## 2024-04-03 PROCEDURE — 700101 HCHG RX REV CODE 250: Performed by: ORTHOPAEDIC SURGERY

## 2024-04-03 PROCEDURE — 700102 HCHG RX REV CODE 250 W/ 637 OVERRIDE(OP): Performed by: STUDENT IN AN ORGANIZED HEALTH CARE EDUCATION/TRAINING PROGRAM

## 2024-04-03 PROCEDURE — 160048 HCHG OR STATISTICAL LEVEL 1-5: Performed by: ORTHOPAEDIC SURGERY

## 2024-04-03 PROCEDURE — 160035 HCHG PACU - 1ST 60 MINS PHASE I: Performed by: ORTHOPAEDIC SURGERY

## 2024-04-03 PROCEDURE — 160046 HCHG PACU - 1ST 60 MINS PHASE II: Performed by: ORTHOPAEDIC SURGERY

## 2024-04-03 PROCEDURE — A9270 NON-COVERED ITEM OR SERVICE: HCPCS | Performed by: STUDENT IN AN ORGANIZED HEALTH CARE EDUCATION/TRAINING PROGRAM

## 2024-04-03 PROCEDURE — 700111 HCHG RX REV CODE 636 W/ 250 OVERRIDE (IP): Mod: JZ | Performed by: STUDENT IN AN ORGANIZED HEALTH CARE EDUCATION/TRAINING PROGRAM

## 2024-04-03 PROCEDURE — 160036 HCHG PACU - EA ADDL 30 MINS PHASE I: Performed by: ORTHOPAEDIC SURGERY

## 2024-04-03 PROCEDURE — 160009 HCHG ANES TIME/MIN: Performed by: ORTHOPAEDIC SURGERY

## 2024-04-03 PROCEDURE — 160002 HCHG RECOVERY MINUTES (STAT): Performed by: ORTHOPAEDIC SURGERY

## 2024-04-03 PROCEDURE — 160025 RECOVERY II MINUTES (STATS): Performed by: ORTHOPAEDIC SURGERY

## 2024-04-03 PROCEDURE — 700111 HCHG RX REV CODE 636 W/ 250 OVERRIDE (IP): Performed by: STUDENT IN AN ORGANIZED HEALTH CARE EDUCATION/TRAINING PROGRAM

## 2024-04-03 PROCEDURE — 700105 HCHG RX REV CODE 258: Performed by: ORTHOPAEDIC SURGERY

## 2024-04-03 PROCEDURE — 160038 HCHG SURGERY MINUTES - EA ADDL 1 MIN LEVEL 2: Performed by: ORTHOPAEDIC SURGERY

## 2024-04-03 RX ORDER — ACETAMINOPHEN 325 MG/1
TABLET ORAL PRN
Status: DISCONTINUED | OUTPATIENT
Start: 2024-04-03 | End: 2024-04-03 | Stop reason: SURG

## 2024-04-03 RX ORDER — KETOROLAC TROMETHAMINE 15 MG/ML
INJECTION, SOLUTION INTRAMUSCULAR; INTRAVENOUS PRN
Status: DISCONTINUED | OUTPATIENT
Start: 2024-04-03 | End: 2024-04-03 | Stop reason: SURG

## 2024-04-03 RX ORDER — BUPIVACAINE HYDROCHLORIDE AND EPINEPHRINE 5; 5 MG/ML; UG/ML
INJECTION, SOLUTION PERINEURAL
Status: DISCONTINUED | OUTPATIENT
Start: 2024-04-03 | End: 2024-04-03 | Stop reason: HOSPADM

## 2024-04-03 RX ORDER — SCOLOPAMINE TRANSDERMAL SYSTEM 1 MG/1
PATCH, EXTENDED RELEASE TRANSDERMAL
Status: COMPLETED
Start: 2024-04-03 | End: 2024-04-03

## 2024-04-03 RX ORDER — HYDROMORPHONE HYDROCHLORIDE 1 MG/ML
0.1 INJECTION, SOLUTION INTRAMUSCULAR; INTRAVENOUS; SUBCUTANEOUS
Status: DISCONTINUED | OUTPATIENT
Start: 2024-04-03 | End: 2024-04-03 | Stop reason: HOSPADM

## 2024-04-03 RX ORDER — LIDOCAINE HYDROCHLORIDE 20 MG/ML
INJECTION, SOLUTION EPIDURAL; INFILTRATION; INTRACAUDAL; PERINEURAL PRN
Status: DISCONTINUED | OUTPATIENT
Start: 2024-04-03 | End: 2024-04-03 | Stop reason: SURG

## 2024-04-03 RX ORDER — OXYCODONE HCL 5 MG/5 ML
10 SOLUTION, ORAL ORAL
Status: COMPLETED | OUTPATIENT
Start: 2024-04-03 | End: 2024-04-03

## 2024-04-03 RX ORDER — ONDANSETRON 2 MG/ML
INJECTION INTRAMUSCULAR; INTRAVENOUS PRN
Status: DISCONTINUED | OUTPATIENT
Start: 2024-04-03 | End: 2024-04-03 | Stop reason: SURG

## 2024-04-03 RX ORDER — OXYCODONE HCL 5 MG/5 ML
5 SOLUTION, ORAL ORAL
Status: COMPLETED | OUTPATIENT
Start: 2024-04-03 | End: 2024-04-03

## 2024-04-03 RX ORDER — HYDROMORPHONE HYDROCHLORIDE 1 MG/ML
0.2 INJECTION, SOLUTION INTRAMUSCULAR; INTRAVENOUS; SUBCUTANEOUS
Status: DISCONTINUED | OUTPATIENT
Start: 2024-04-03 | End: 2024-04-03 | Stop reason: HOSPADM

## 2024-04-03 RX ORDER — MIDAZOLAM HYDROCHLORIDE 1 MG/ML
INJECTION INTRAMUSCULAR; INTRAVENOUS PRN
Status: DISCONTINUED | OUTPATIENT
Start: 2024-04-03 | End: 2024-04-03 | Stop reason: SURG

## 2024-04-03 RX ORDER — ONDANSETRON 2 MG/ML
4 INJECTION INTRAMUSCULAR; INTRAVENOUS
Status: COMPLETED | OUTPATIENT
Start: 2024-04-03 | End: 2024-04-03

## 2024-04-03 RX ORDER — EPHEDRINE SULFATE 50 MG/ML
INJECTION, SOLUTION INTRAVENOUS PRN
Status: DISCONTINUED | OUTPATIENT
Start: 2024-04-03 | End: 2024-04-03 | Stop reason: SURG

## 2024-04-03 RX ORDER — SCOLOPAMINE TRANSDERMAL SYSTEM 1 MG/1
PATCH, EXTENDED RELEASE TRANSDERMAL PRN
Status: DISCONTINUED | OUTPATIENT
Start: 2024-04-03 | End: 2024-04-03 | Stop reason: SURG

## 2024-04-03 RX ORDER — HYDROMORPHONE HYDROCHLORIDE 1 MG/ML
0.4 INJECTION, SOLUTION INTRAMUSCULAR; INTRAVENOUS; SUBCUTANEOUS
Status: DISCONTINUED | OUTPATIENT
Start: 2024-04-03 | End: 2024-04-03 | Stop reason: HOSPADM

## 2024-04-03 RX ORDER — SODIUM CHLORIDE, SODIUM LACTATE, POTASSIUM CHLORIDE, CALCIUM CHLORIDE 600; 310; 30; 20 MG/100ML; MG/100ML; MG/100ML; MG/100ML
INJECTION, SOLUTION INTRAVENOUS CONTINUOUS
Status: ACTIVE | OUTPATIENT
Start: 2024-04-03 | End: 2024-04-03

## 2024-04-03 RX ORDER — HALOPERIDOL 5 MG/ML
1 INJECTION INTRAMUSCULAR
Status: DISCONTINUED | OUTPATIENT
Start: 2024-04-03 | End: 2024-04-03 | Stop reason: HOSPADM

## 2024-04-03 RX ORDER — DEXAMETHASONE SODIUM PHOSPHATE 4 MG/ML
INJECTION, SOLUTION INTRA-ARTICULAR; INTRALESIONAL; INTRAMUSCULAR; INTRAVENOUS; SOFT TISSUE PRN
Status: DISCONTINUED | OUTPATIENT
Start: 2024-04-03 | End: 2024-04-03 | Stop reason: SURG

## 2024-04-03 RX ORDER — CEFAZOLIN SODIUM 1 G/3ML
INJECTION, POWDER, FOR SOLUTION INTRAMUSCULAR; INTRAVENOUS PRN
Status: DISCONTINUED | OUTPATIENT
Start: 2024-04-03 | End: 2024-04-03 | Stop reason: SURG

## 2024-04-03 RX ORDER — ACETAMINOPHEN 500 MG
TABLET ORAL
Status: COMPLETED
Start: 2024-04-03 | End: 2024-04-03

## 2024-04-03 RX ORDER — DIPHENHYDRAMINE HYDROCHLORIDE 50 MG/ML
12.5 INJECTION INTRAMUSCULAR; INTRAVENOUS
Status: DISCONTINUED | OUTPATIENT
Start: 2024-04-03 | End: 2024-04-03 | Stop reason: HOSPADM

## 2024-04-03 RX ADMIN — FENTANYL CITRATE 25 MCG: 50 INJECTION, SOLUTION INTRAMUSCULAR; INTRAVENOUS at 15:45

## 2024-04-03 RX ADMIN — DEXAMETHASONE SODIUM PHOSPHATE 4 MG: 4 INJECTION INTRA-ARTICULAR; INTRALESIONAL; INTRAMUSCULAR; INTRAVENOUS; SOFT TISSUE at 14:28

## 2024-04-03 RX ADMIN — CEFAZOLIN 2 G: 1 INJECTION, POWDER, FOR SOLUTION INTRAMUSCULAR; INTRAVENOUS at 14:21

## 2024-04-03 RX ADMIN — SCOPOLAMINE 1 PATCH: 1.5 PATCH, EXTENDED RELEASE TRANSDERMAL at 14:11

## 2024-04-03 RX ADMIN — LIDOCAINE HYDROCHLORIDE 100 MG: 20 INJECTION, SOLUTION EPIDURAL; INFILTRATION; INTRACAUDAL at 14:17

## 2024-04-03 RX ADMIN — FENTANYL CITRATE 25 MCG: 50 INJECTION, SOLUTION INTRAMUSCULAR; INTRAVENOUS at 15:00

## 2024-04-03 RX ADMIN — PROPOFOL 180 MG: 10 INJECTION, EMULSION INTRAVENOUS at 14:21

## 2024-04-03 RX ADMIN — ONDANSETRON 4 MG: 2 INJECTION INTRAMUSCULAR; INTRAVENOUS at 16:04

## 2024-04-03 RX ADMIN — FENTANYL CITRATE 50 MCG: 50 INJECTION, SOLUTION INTRAMUSCULAR; INTRAVENOUS at 14:26

## 2024-04-03 RX ADMIN — KETOROLAC TROMETHAMINE 15 MG: 15 INJECTION, SOLUTION INTRAMUSCULAR; INTRAVENOUS at 15:00

## 2024-04-03 RX ADMIN — FENTANYL CITRATE 50 MCG: 50 INJECTION, SOLUTION INTRAMUSCULAR; INTRAVENOUS at 14:17

## 2024-04-03 RX ADMIN — OXYCODONE HYDROCHLORIDE 5 MG: 5 SOLUTION ORAL at 15:33

## 2024-04-03 RX ADMIN — ACETAMINOPHEN 1000 MG: 325 TABLET ORAL at 14:11

## 2024-04-03 RX ADMIN — EPHEDRINE SULFATE 15 MG: 50 INJECTION, SOLUTION INTRAVENOUS at 14:45

## 2024-04-03 RX ADMIN — FENTANYL CITRATE 25 MCG: 50 INJECTION, SOLUTION INTRAMUSCULAR; INTRAVENOUS at 15:03

## 2024-04-03 RX ADMIN — MIDAZOLAM HYDROCHLORIDE 2 MG: 1 INJECTION, SOLUTION INTRAMUSCULAR; INTRAVENOUS at 14:17

## 2024-04-03 RX ADMIN — FENTANYL CITRATE 25 MCG: 50 INJECTION, SOLUTION INTRAMUSCULAR; INTRAVENOUS at 14:45

## 2024-04-03 RX ADMIN — ONDANSETRON 4 MG: 2 INJECTION INTRAMUSCULAR; INTRAVENOUS at 14:28

## 2024-04-03 RX ADMIN — FENTANYL CITRATE 25 MCG: 50 INJECTION, SOLUTION INTRAMUSCULAR; INTRAVENOUS at 15:38

## 2024-04-03 RX ADMIN — FENTANYL CITRATE 25 MCG: 50 INJECTION, SOLUTION INTRAMUSCULAR; INTRAVENOUS at 14:54

## 2024-04-03 RX ADMIN — SODIUM CHLORIDE, POTASSIUM CHLORIDE, SODIUM LACTATE AND CALCIUM CHLORIDE: 600; 310; 30; 20 INJECTION, SOLUTION INTRAVENOUS at 14:16

## 2024-04-03 ASSESSMENT — PAIN DESCRIPTION - PAIN TYPE: TYPE: SURGICAL PAIN

## 2024-04-03 ASSESSMENT — FIBROSIS 4 INDEX: FIB4 SCORE: .6461538461538461538

## 2024-04-03 NOTE — ANESTHESIA PREPROCEDURE EVALUATION
Case: [id number] Date/Time: 04/03/24 1245    Procedure: RIGHT KNEE ARTHROSCOPY PARTIAL MEDIAL MENISCECTOMY    Pre-op diagnosis: TEAR OF MEDIAL MENISCUS OF KNEE    Location: [facility] OR 06 / SURGERY [facility]    Surgeons: Conner Molina M.D.          44yo M w/ asthma, PONV, EtOH use, THC use.    Relevant Problems   PULMONARY   (positive) Mild intermittent asthma without complication      NEURO   (positive) Headache      GI   (positive) Gastroesophageal reflux disease with esophagitis         (positive) Nephrolithiasis       Physical Exam    Anesthesia Plan    ASA 2       Plan - general       Airway plan will be LMA          Induction: intravenous    Postoperative Plan: Postoperative administration of opioids is intended.    Pertinent diagnostic labs and testing reviewed    Informed Consent:    Anesthetic plan and risks discussed with patient.    Use of blood products discussed with: patient whom consented to blood products.

## 2024-04-03 NOTE — OP REPORT
DATE OF SERVICE:  04/03/2024     PREOPERATIVE DIAGNOSIS:  Internal derangement, right knee.     POSTOPERATIVE DIAGNOSES:    1.  Right knee medial meniscal tear.  2.  Right knee patellar chondromalacia.     PROCEDURES:  Arthroscopy, surgical knee; partial medial meniscectomy, right   knee.     SURGEON:  Conner Molina MD     ASSISTANT:  None.     ANESTHESIA:  General endotracheal anesthesia.     ANESTHESIOLOGIST:  Rohit Quiroz MD     ESTIMATED BLOOD LOSS:  Minimal.     COMPLICATIONS:  None.     INDICATIONS FOR PROCEDURE:  This is a 43-year-old male with significant   mechanical symptoms in his right knee.  This has been going on for   approximately a year.  MRI demonstrates a medial meniscal tear.  He presents   for operative treatment.  For further details of H and P, please see chart.     Risks, benefits and alternatives of procedure were discussed with the patient   and include but are not limited to bleeding, infection, neurovascular injury,   need for further surgery, PE, DVT, blood transfusion with its associated   risks, anesthesia with its associated risks, and death.  All questions were   answered.  No warranties or guarantees were given or implied.  Consent is   signed and is on chart.     DESCRIPTION OF PROCEDURE:  The patient was taken to the operating room and   placed supine on the operating table.  General endotracheal anesthesia was   induced.  The patient's right leg was placed in the arthroscopic leg yung.    Left leg was well padded.  Foot of the bed was dropped.  Right lower extremity   was prepped and draped in normal sterile fashion.  Superolateral,   anterolateral, and anteromedial portals were identified and infiltrated with   0.5% Marcaine with epinephrine.  Superolateral portal was incised and outflow   cannula placed in the knee.  Anterolateral portal was incised and arthroscope   placed in the knee.  Examination of the knee showed the outflow cannula to be   in good position.   There were no loose bodies in the medial or lateral gutter.    Patella showed multiple fissures at the apex of the patella.  No obvious   undermined flaps.  Trochlear cartilage was intact.  ACL was intact.  Lateral   compartment showed normal meniscus with no obvious chondromalacia.  Medial   compartment showed some areas of grade 1-2 chondromalacia of the femur.  There   was a flap tear of the medial meniscus, which was incarcerated underneath the   meniscal body and out the medial aspect of the joint line.  It then extended   posteriorly into the posterior horn.  Medial portal was incised.  Probe was   placed and was able to flip the meniscal fragment out from underneath the body   and with careful debridement with a shaver as well as up-biter, shaved this   back to a stable edge.  The posterior horn was cleaned up with a shaver.    Further probing showed no further abnormalities.  Probing the lateral meniscus   showed no further abnormalities.  Probing of the patella showed fissures in   the cartilage without any significant undermined flaps.  It was not felt that   further treatment was necessary in this area.  At this point, the arthroscope   was removed from the knee.  As much fluid was expressed as possible.  The   portals were closed using 4-0 nylon.  The patient was placed in a soft sterile   dressing.  He was awakened from anesthesia and returned to recovery cart in   the recovery room in stable condition.  There were no medardo- or intraoperative   complications noted.        ______________________________  MD MATTI Godfrey/NELI    DD:  04/03/2024 15:12  DT:  04/03/2024 16:17    Job#:  [id number]

## 2024-04-03 NOTE — OR NURSING
1508  To PACU from OR via frankrkamila, sleeping, respirations spontaneous and non-labored via OPA. Icepack applied over c/d/i right knee surgical dressings.    1523 no change, report to Antonio NAGY

## 2024-04-03 NOTE — ANESTHESIA PROCEDURE NOTES
Airway    Date/Time: 4/3/2024 2:23 PM    Performed by: Rohit Quiroz M.D.  Authorized by: Rohit Quiroz M.D.    Location:  OR  Urgency:  Elective  Indications for Airway Management:  Anesthesia      Spontaneous Ventilation: absent    Sedation Level:  Deep  Preoxygenated: Yes    Mask Difficulty Assessment:  0 - not attempted  Final Airway Type:  Supraglottic airway  Final Supraglottic Airway:  Standard LMA    SGA Size:  4  Number of Attempts at Approach:  1

## 2024-04-03 NOTE — OR NURSING
1604 medicated for c/o nausea    1615 states nausea better, taking sips po fluids, called family with update    1626 transfer to phase 2 in stable condition via bebe

## 2024-04-03 NOTE — OR NURSING
1523: Report received from Cami NAGY. Pt sleeping with OPA in place. Dressing CDI. VSS on 6L    1530: OPA removed    1533: c/o 4/10 right knee pain. See MAR, PO analgesia given    1538: c/o 6/10 right knee pain. See MAR    1545: c/o 5/10 right knee pain. See MAR    1555: report to Cami NAGY

## 2024-04-03 NOTE — OR NURSING
1626:Pt arrive to stage 2 via gurney. Dressed and up to chair. Pt states pain and nausea are tolerable. Stable on RA. Family present.    1705: DC education provided to pt and pt family, both verbalized understanding. D/Kyle to care of family post uneventful stay in PACU 2. Assisted out to car in .

## 2024-04-03 NOTE — DISCHARGE INSTRUCTIONS
Surgeon specific instructions  Weight bearing as tolerated  You may shower with the dressing covered    ACTIVITY: Rest and take it easy for the first 24 hours.  A responsible adult is recommended to remain with you during that time.  It is normal to feel sleepy.  We encourage you to not do anything that requires balance, judgment or coordination.    MILD FLU-LIKE SYMPTOMS ARE NORMAL. YOU MAY EXPERIENCE GENERALIZED MUSCLE ACHES, THROAT IRRITATION, HEADACHE AND/OR SOME NAUSEA.    FOR 24 HOURS DO NOT:  Drive, operate machinery or run household appliances.  Drink beer or alcoholic beverages.   Make important decisions or sign legal documents.    DIET: To avoid nausea, slowly advance diet as tolerated, avoiding spicy or greasy foods for the first day.  Add more substantial food to your diet according to your physician's instructions. INCREASE FLUIDS AND FIBER TO AVOID CONSTIPATION.    FOLLOW-UP APPOINTMENT:  A follow-up appointment should be arranged with your doctor; call to schedule.    You should CALL YOUR PHYSICIAN if you develop:  Fever greater than 101 degrees F.  Pain not relieved by medication, or persistent nausea or vomiting.  Excessive bleeding (blood soaking through dressing) or unexpected drainage from the wound.  Extreme redness or swelling around the incision site, drainage of pus or foul smelling drainage.  Inability to urinate or empty your bladder within 8 hours.  Problems with breathing or chest pain.    You should call 911 if you develop problems with breathing or chest pain.  If you are unable to contact your doctor or surgical center, you should go to the nearest emergency room or urgent care center.  Physician's telephone #: [phone]    If any questions arise, call your doctor.  If your doctor is not available, please feel free to call the Surgical Center at [phone].     A registered nurse may call you a few days after your surgery to see how you are doing after your  procedure.    MEDICATIONS: Resume taking daily medication.  Take prescribed pain medication with food.  If no medication is prescribed, you may take non-aspirin pain medication if needed.  PAIN MEDICATION CAN BE VERY CONSTIPATING.  Take a stool softener or laxative such as senokot, pericolace, or milk of magnesia if needed.    Last pain medication given at 03:33 PM, 5 mg Oxycodone.    If your physician has prescribed pain medication that includes Acetaminophen (Tylenol), do not take additional Acetaminophen (Tylenol) while taking the prescribed medication.      Post Op Knee Discharge Instructions  Activity:   You may put full weight on your leg as you feel comfortable.  Crutches may be used to help you walk but are not absolutely necessary unless otherwise indicated.  You may work on flexing and extending your knee immediately as your pain level allows.    Dressing and Wound Care:    Keep your knee dressing clean and dry after surgery.  Be aware that some spotting of the dressing with blood can occur and is normal.  You may remove this dressing 3 days after the operation.  Notice that you have two or three small incisions that have been closed with stitches or in some cases one larger incision.  Cover each of these incisions with band-aids or non-adherent gauze, and wrap the knee with an ace bandage.  Band-aids / gauze pads should be changed and the knee re-wrapped each day.    Shower / Bathing:    Keep the knee covered and dry for 3 days after your surgery.  Then, you may shower as long as your incisions are completely dry and not draining any fluid.  Do not soak the knee in water.Swimming pools, hot tubs, or baths are to be avoided until after your follow up and then only if cleared by your surgeon.     Apply Ice Pack to Knee:   Apply ice packs to your knee (15 minutes on the knee, 15 minutes off the knee) for the first week, as you feel necessary to help with the pain and swelling.    Elevation:   Keep your knee  and foot elevated as much as possible to control swelling.  Be aware that a mild-moderate amount of knee swelling is expected.      SWELLING/BRUISING  Swelling is an expected response to surgery. To reduce swelling, elevate your surgical limb above heart level multiple times per day and apply ice (see Ice instructions). If your swelling becomes excessive, is limiting your ability to move, or becomes worrisome please contact your doctor's office.    Bruising often does not appear until after you arrive home and can be quite dramatic-appearing purple, black, or green. Bruising is typically not concerning and will subside without any treatment.        Scopolamine Patches  Remove the patch behind your ear in 72 hours 4/6. Wash your hands thoroughly with soap and water after removing the patch. Do not touch your eyes or mouth until your hands are washed.      What is this medication?  SCOPOLAMINE (skoe MACO a meen) prevents nausea and vomiting. It works by blocking substances in your body that may cause nausea and vomiting. It belongs to a class of medications called antiemetics.  This medicine may be used for other purposes; ask your health care provider or pharmacist if you have questions.  COMMON BRAND NAME(S): Transderm Scop  What should I tell my care team before I take this medication?  They need to know if you have any of these conditions:  Are scheduled to have a gastric secretion test  Glaucoma  Heart disease  Kidney disease  Liver disease  Lung or breathing disease, such as asthma  Mental health condition  Prostate disease  Seizures  Stomach or intestine problems  Trouble passing urine  An unusual or allergic reaction to scopolamine, atropine, other medications, foods, dyes, or preservatives  Pregnant or trying to get pregnant  Breast-feeding  How should I use this medication?  This medication is for external use only. Follow the directions on the prescription label. Wear only 1 patch at a time. Choose an area  behind the ear, that is clean, dry, hairless and free from any cuts or irritation. Wipe the area with a clean dry tissue. Peel off the plastic backing of the skin patch, trying not to touch the adhesive side with your hands. Do not cut the patches. Firmly apply to the area you have chosen, with the metallic side of the patch to the skin and the tan-colored side showing. Once firmly in place, wash your hands well with soap and water. Do not get this medication in your eyes.  After removing the patch, wash your hands and the area behind your ear thoroughly with soap and water. The patch will still contain some medication after use. To avoid accidental contact or ingestion by children or pets, fold the used patch in half with the sticky side together and throw away in the trash out of the reach of children and pets. If you need to use a second patch after you remove the first, place it behind the other ear.  A special MedGuide will be given to you by the pharmacist with each prescription and refill. Be sure to read this information carefully each time.  Talk to your care team about the use of this medication in children. Special care may be needed.  Overdosage: If you think you have taken too much of this medicine contact a poison control center or emergency room at once.  NOTE: This medicine is only for you. Do not share this medicine with others.  What if I miss a dose?  This does not apply. This medication is not for regular use.  What may interact with this medication?  Alcohol  Antihistamines for allergy cough and cold  Atropine  Certain medications for anxiety or sleep  Certain medications for bladder problems, such as oxybutynin, tolterodine  Certain medications for depression, such as amitriptyline, fluoxetine, sertraline  Certain medications for Parkinson disease, such as benztropine, trihexyphenidyl  Certain medications for seizures, such as phenobarbital, primidone  Certain medications for stomach problems,  such as dicyclomine, hyoscyamine  General anesthetics, such as halothane, isoflurane, methoxyflurane, propofol  Ipratropium  Local anesthetics, such as lidocaine, pramoxine, tetracaine  Medications that relax muscles for surgery  Opioid medications for pain  Other belladonna alkaloids  Phenothiazines, such as chlorpromazine, mesoridazine, prochlorperazine, thioridazine  This list may not describe all possible interactions. Give your health care provider a list of all the medicines, herbs, non-prescription drugs, or dietary supplements you use. Also tell them if you smoke, drink alcohol, or use illegal drugs. Some items may interact with your medicine.  What should I watch for while using this medication?  Limit contact with water while swimming and bathing because the patch may fall off. If the patch falls off, throw it away and put a new one behind the other ear.  This medication may affect your coordination, reaction time, or judgment. Do not drive or operate machinery until you know how this medication affects you. Sit up or stand slowly to reduce the risk of dizzy or fainting spells. Drinking alcohol with this medication can increase the risk of these side effects.  Your mouth may get dry. Chewing sugarless gum or sucking hard candy, and drinking plenty of water may help. Contact your care team if the problem does not go away or is severe.  This medication may cause dry eyes and blurred vision. If you wear contact lenses, you may feel some discomfort. Lubricating drops may help. See your care team if the problem does not go away or is severe.  If you are going to need surgery, an MRI, CT scan, or other procedure, tell your care team that you are using this medication. You may need to remove the patch before the procedure.  What side effects may I notice from receiving this medication?  Side effects that you should report to your care team as soon as possible:  Allergic reactions--skin rash, itching, hives,  swelling of the face, lips, tongue, or throat  Constipation, bloating, nausea or vomiting, stomach pain, which may be signs of slow movement through the digestive tract  Mood and behavior changes--anxiety, nervousness, confusion, hallucinations, irritability, hostility, thoughts of suicide or self-harm, worsening mood, feelings of depression  Seizures  Sudden eye pain or change in vision such as blurry vision, seeing halos around lights, vision loss  Trouble passing urine  Side effects that usually do not require medical attention (report to your care team if they continue or are bothersome):  Confusion  Dizziness  Drowsiness  Dry mouth  Sore throat  This list may not describe all possible side effects. Call your doctor for medical advice about side effects. You may report side effects to FDA at 8-215-FDA-8394.  Where should I keep my medication?  Keep out of the reach of children and pets.  Store at room temperature between 20 and 25 degrees C (68 and 77 degrees F). Keep this medication in the foil package until ready to use.  Get rid of any unused medication after the expiration date.  NOTE: This sheet is a summary. It may not cover all possible information. If you have questions about this medicine, talk to your doctor, pharmacist, or health care provider.  © 2023 [employer] (2022-11-09 00:00:00)

## 2024-04-03 NOTE — ANESTHESIA TIME REPORT
Anesthesia Start and Stop Event Times       Date Time Event    4/3/2024 1414 Ready for Procedure     1416 Anesthesia Start     1511 Anesthesia Stop          Responsible Staff  04/03/24      Name Role Begin End    Min DEWAYNE Quiroz M.D. Anesth 1416 1511          Overtime Reason:  no overtime (within assigned shift)    Comments:

## 2024-04-03 NOTE — OR SURGEON
Immediate Post OP Note    PreOp Diagnosis: internal derangement right knee      PostOp Diagnosis: right knee medial meniscal tear, patellar chondromalacia      Procedure(s):  RIGHT KNEE ARTHROSCOPY PARTIAL MEDIAL MENISCECTOMY - Wound Class: Clean    Surgeon(s):  Conner Molina M.D.    Anesthesiologist/Type of Anesthesia:  Anesthesiologist: Rohit Quiroz M.D./General    Surgical Staff:  Circulator: Loida Martinez R.N.  Scrub Person: Elias Belle    Specimens removed if any:  * No specimens in log *    Estimated Blood Loss: minmal    Findings: medial meniscal tear     Complications: none        4/3/2024 3:08 PM Conner Molina M.D.

## 2024-04-04 NOTE — ANESTHESIA POSTPROCEDURE EVALUATION
Patient: THU EASTON    Procedure Summary       Date: 04/03/24 Room / Location:  OR 06 / SURGERY [facility]    Anesthesia Start: 1416 Anesthesia Stop: 1511    Procedure: RIGHT KNEE ARTHROSCOPY PARTIAL MEDIAL MENISCECTOMY (Right: Knee) Diagnosis: (TEAR OF MEDIAL MENISCUS OF KNEE)    Surgeons: Conner Molina M.D. Responsible Provider: Rohit Quiroz M.D.    Anesthesia Type: general ASA Status: 2            Final Anesthesia Type: general  Last vitals  BP   Blood Pressure: (!) 147/91    Temp   36.1 °C (97 °F)    Pulse   (!) 51   Resp   17    SpO2   99 %      Anesthesia Post Evaluation    Patient location during evaluation: PACU  Patient participation: complete - patient participated  Level of consciousness: awake and alert    Airway patency: patent  Anesthetic complications: no  Cardiovascular status: hemodynamically stable  Respiratory status: acceptable  Hydration status: euvolemic    PONV: none          There were no known notable events for this encounter.     Nurse Pain Score: 3 (NPRS)

## 2024-04-19 ENCOUNTER — OFFICE VISIT (OUTPATIENT)
Dept: DERMATOLOGY | Facility: IMAGING CENTER | Age: 44
End: 2024-04-19

## 2024-04-19 DIAGNOSIS — L91.8 SKIN TAG: ICD-10-CM

## 2024-04-19 PROCEDURE — 99999 PR NO CHARGE: CPT | Performed by: DERMATOLOGY

## 2024-04-19 NOTE — PROGRESS NOTES
CC: Possible skin tags    Subjective: Previously seen patient here. Pt states he has skin tags on torso, eye and axillas  Pt aware if its too close to the eye, we cannot treat it here    ROS: no fevers/chills. No itch.  No cough  Relevant PMH:NC  Social:NS    PE: Gen:WDWN male in NAD. Skin: pinpoint tags on right facial cheek and 3 each r/l axilla and 2 on neck/upper chest      A/P: ST: reviewd dx/tx  -9-11 sites chosen for trx  -R/B/A reviewed prior to treatment - LN2 20-25 sec X 1-2 cycles  -f/u PRN  -$150 paid today at end of service    I have reviewed medications relevant to my specialty.      I have reviewed medications relevant to my specialty.

## 2024-06-05 ENCOUNTER — OFFICE VISIT (OUTPATIENT)
Dept: URGENT CARE | Facility: CLINIC | Age: 44
End: 2024-06-05
Payer: COMMERCIAL

## 2024-06-05 VITALS
TEMPERATURE: 97.9 F | DIASTOLIC BLOOD PRESSURE: 78 MMHG | HEART RATE: 71 BPM | RESPIRATION RATE: 16 BRPM | OXYGEN SATURATION: 96 % | WEIGHT: 195 LBS | SYSTOLIC BLOOD PRESSURE: 120 MMHG | HEIGHT: 73 IN | BODY MASS INDEX: 25.84 KG/M2

## 2024-06-05 DIAGNOSIS — S61.209A OPEN WOUND OF FINGER, INFECTED, INITIAL ENCOUNTER: ICD-10-CM

## 2024-06-05 DIAGNOSIS — L08.9 OPEN WOUND OF FINGER, INFECTED, INITIAL ENCOUNTER: ICD-10-CM

## 2024-06-05 PROCEDURE — 99213 OFFICE O/P EST LOW 20 MIN: CPT | Performed by: NURSE PRACTITIONER

## 2024-06-05 PROCEDURE — 3078F DIAST BP <80 MM HG: CPT | Performed by: NURSE PRACTITIONER

## 2024-06-05 PROCEDURE — 3074F SYST BP LT 130 MM HG: CPT | Performed by: NURSE PRACTITIONER

## 2024-06-05 RX ORDER — OXYCODONE HYDROCHLORIDE AND ACETAMINOPHEN 5; 325 MG/1; MG/1
TABLET ORAL
COMMUNITY
Start: 2024-03-26 | End: 2024-06-14

## 2024-06-05 RX ORDER — OXYCODONE HYDROCHLORIDE AND ACETAMINOPHEN 5; 325 MG/1; MG/1
TABLET ORAL
COMMUNITY
End: 2024-06-14

## 2024-06-05 RX ORDER — CEPHALEXIN 500 MG/1
500 CAPSULE ORAL 4 TIMES DAILY
Qty: 28 CAPSULE | Refills: 0 | Status: SHIPPED | OUTPATIENT
Start: 2024-06-05 | End: 2024-06-12

## 2024-06-05 ASSESSMENT — ENCOUNTER SYMPTOMS
SPEECH CHANGE: 0
BRUISES/BLEEDS EASILY: 0
SENSORY CHANGE: 0
FEVER: 0
FOCAL WEAKNESS: 0
CHILLS: 0
TINGLING: 0

## 2024-06-05 ASSESSMENT — FIBROSIS 4 INDEX: FIB4 SCORE: 0.66

## 2024-06-06 NOTE — PROGRESS NOTES
Subjective     Checo EASTON is a 44 y.o. male who presents with Laceration (Small cut on right hand ring finger (4th digit) x 3 days)            HPI  New problem.  Patient is a 44-year-old male who presents with a 4-day history of a small cut to the dorsal aspect of his right ring finger.  He does not have any active bleeding but is concerned as it has become more red and raised with more pain.  He he states that when he squeezes it he gets purulent discharge out of it.  He does not have any distal paresthesia or focal weakness.  He is up-to-date on tetanus per his report.  He is unsure of the mechanism of injury.    Azithromycin  Current Outpatient Medications on File Prior to Visit   Medication Sig Dispense Refill    albuterol 108 (90 Base) MCG/ACT Aero Soln inhalation aerosol INHALE 2 PUFFS BY MOUTH EVERY FOUR HOURS AS NEEDED FOR SHORTNESS OF BREATH. 6.7 Each 1    omeprazole (PRILOSEC) 20 MG delayed-release capsule Take 20 mg by mouth every day.      oxyCODONE-acetaminophen (PERCOCET) 5-325 MG Tab TAKE 1 TO 2 TABLETS BY MOUTH EVERY 6 HOURS AS NEEDED FOR PAIN X 7 DAYS (Patient not taking: Reported on 6/5/2024)      oxyCODONE-acetaminophen (PERCOCET) 5-325 MG Tab 1-2 tabs po q6hrs prn pain x 7 days (Patient not taking: Reported on 6/5/2024)       No current facility-administered medications on file prior to visit.     Social History     Socioeconomic History    Marital status:      Spouse name: Not on file    Number of children: Not on file    Years of education: Not on file    Highest education level: Associate degree: occupational, technical, or vocational program   Occupational History    Not on file   Tobacco Use    Smoking status: Never    Smokeless tobacco: Never   Vaping Use    Vaping status: Never Used   Substance and Sexual Activity    Alcohol use: Yes     Alcohol/week: 1.2 oz     Types: 2 Standard drinks or equivalent per week     Comment: 2/27/24-Occasional. Had quit drinking previously Sept 3,  2021    Drug use: Yes     Types: Inhaled     Comment: Marijuana-daily    Sexual activity: Yes     Partners: Female     Comment: : 2011   Other Topics Concern    Not on file   Social History Narrative    Not on file     Social Determinants of Health     Financial Resource Strain: Low Risk  (10/16/2023)    Overall Financial Resource Strain (CARDIA)     Difficulty of Paying Living Expenses: Not very hard   Food Insecurity: No Food Insecurity (10/16/2023)    Hunger Vital Sign     Worried About Running Out of Food in the Last Year: Never true     Ran Out of Food in the Last Year: Never true   Transportation Needs: No Transportation Needs (10/16/2023)    PRAPARE - Transportation     Lack of Transportation (Medical): No     Lack of Transportation (Non-Medical): No   Physical Activity: Sufficiently Active (10/16/2023)    Exercise Vital Sign     Days of Exercise per Week: 3 days     Minutes of Exercise per Session: 120 min   Stress: Stress Concern Present (10/16/2023)    [place] [place] [employer] - Occupational Stress Questionnaire     Feeling of Stress : Rather much   Social Connections: Unknown (10/16/2023)    Social Connection and Isolation Panel [NHANES]     Frequency of Communication with Friends and Family: More than three times a week     Frequency of Social Gatherings with Friends and Family: Never     Attends [employer] Services: Patient declined     Active Member of Clubs or Organizations: No     Attends Club or Organization Meetings: Patient declined     Marital Status:    Intimate Partner Violence: Not on file   Housing Stability: Low Risk  (10/16/2023)    Housing Stability Vital Sign     Unable to Pay for Housing in the Last Year: No     Number of Places Lived in the Last Year: 1     Unstable Housing in the Last Year: No     Breast Cancer-related family history is not on file.      Review of Systems   Constitutional:  Negative for chills, fever and malaise/fatigue.   Skin:          "Small cut on right ring finger   Neurological:  Negative for tingling, sensory change, speech change and focal weakness.   Endo/Heme/Allergies:  Does not bruise/bleed easily.              Objective     /78 (BP Location: Left arm, Patient Position: Sitting, BP Cuff Size: Adult)   Pulse 71   Temp 36.6 °C (97.9 °F) (Temporal)   Resp 16   Ht 1.854 m (6' 1\")   Wt 88.5 kg (195 lb)   SpO2 96%   BMI 25.73 kg/m²      Physical Exam  Vitals and nursing note reviewed.   Constitutional:       Appearance: Normal appearance.   Cardiovascular:      Rate and Rhythm: Normal rate and regular rhythm.      Heart sounds: No murmur heard.  Pulmonary:      Effort: Pulmonary effort is normal.      Breath sounds: Normal breath sounds.   Musculoskeletal:         General: Normal range of motion.   Skin:     General: Skin is warm and dry.      Comments: Small scabbed open wound to dorsal aspect of right ring finger at level of PIP joint. +surrounding erythema and swelling. No discharge noted on exam. TTP.   Neurological:      Mental Status: He is alert.                             Assessment & Plan   1. Open wound of finger, infected, initial encounter  cephALEXin (KEFLEX) 500 MG Cap        Wound care instructions reviewed with patient- wash twice daily with soap and water and rebandage.  UTD on tdap per his report.  Keflex.  Differential diagnosis, natural history, supportive care, and indications for immediate follow-up were discussed.                         "

## 2024-06-14 ENCOUNTER — HOSPITAL ENCOUNTER (OUTPATIENT)
Facility: MEDICAL CENTER | Age: 44
End: 2024-06-14
Payer: COMMERCIAL

## 2024-06-14 ENCOUNTER — APPOINTMENT (OUTPATIENT)
Dept: MEDICAL GROUP | Facility: MEDICAL CENTER | Age: 44
End: 2024-06-14
Payer: COMMERCIAL

## 2024-06-14 VITALS
OXYGEN SATURATION: 97 % | DIASTOLIC BLOOD PRESSURE: 68 MMHG | TEMPERATURE: 97.2 F | HEIGHT: 73 IN | BODY MASS INDEX: 26.06 KG/M2 | HEART RATE: 48 BPM | SYSTOLIC BLOOD PRESSURE: 118 MMHG | WEIGHT: 196.6 LBS

## 2024-06-14 DIAGNOSIS — Z11.3 SCREENING EXAMINATION FOR SEXUALLY TRANSMITTED DISEASE: ICD-10-CM

## 2024-06-14 DIAGNOSIS — F41.9 ANXIETY: ICD-10-CM

## 2024-06-14 DIAGNOSIS — F13.20 BENZODIAZEPINE DEPENDENCE (HCC): ICD-10-CM

## 2024-06-14 PROBLEM — M25.561 ARTHRALGIA OF RIGHT KNEE: Status: ACTIVE | Noted: 2024-04-16

## 2024-06-14 PROBLEM — S83.249A TEAR OF MEDIAL MENISCUS OF KNEE: Status: ACTIVE | Noted: 2024-04-16

## 2024-06-14 PROCEDURE — 87591 N.GONORRHOEAE DNA AMP PROB: CPT

## 2024-06-14 PROCEDURE — 3078F DIAST BP <80 MM HG: CPT

## 2024-06-14 PROCEDURE — 99214 OFFICE O/P EST MOD 30 MIN: CPT

## 2024-06-14 PROCEDURE — 87491 CHLMYD TRACH DNA AMP PROBE: CPT

## 2024-06-14 PROCEDURE — 3074F SYST BP LT 130 MM HG: CPT

## 2024-06-14 RX ORDER — ALPRAZOLAM 0.5 MG/1
0.5 TABLET ORAL NIGHTLY PRN
Qty: 30 TABLET | Refills: 0 | Status: SHIPPED | OUTPATIENT
Start: 2024-06-14 | End: 2024-07-14

## 2024-06-14 ASSESSMENT — ENCOUNTER SYMPTOMS
PALPITATIONS: 0
CHILLS: 0
SHORTNESS OF BREATH: 0
FEVER: 0
ORTHOPNEA: 0
COUGH: 0

## 2024-06-14 ASSESSMENT — FIBROSIS 4 INDEX: FIB4 SCORE: 0.66

## 2024-06-14 NOTE — PROGRESS NOTES
Subjective:     Verbal consent was acquired by the patient to use [employer] ambient listening note generation during this visit Yes    CC: Follow-Up (Pt would like to discuss anxiety and possible rx for Xanax )      HPI:   Diego is a 44 y.o. male who presents today for:    History of Present Illness  The patient presents for follow-up on his anxiety.    The patient is seeking a refill of his Xanax prescription.  His last prescription was 0.25 mg, which he reports as being less effective than the 0.5 mg. He expresses a preference for the higher dosage when experiencing heightened anxiety, rather than the necessity of taking two tablets to get relief. He estimates that the medication will last him approximately three months, with a maximum of 10 tablets per month.  PDMP reviewed today, his last fill was 1/4/2024.  Despite his efforts to limit his usage, he acknowledges the possibility of his anxiety being severe. Over the past few months, he has experienced heightened anxiety due to financial stressors.    The patient expresses a desire to undergo an HIV test and an STD screening.  He denies any acute exposure, or symptoms but would like to be screened.     Patient is here today for refill on their chronic pain medication    Narx check completed. Last fill on 1/4/24      Chronic Opiate/ benzo therapy:  (5 A's)  Analgesia:  improved  Activity:  improved  Adverse Events:  none   Aberrant Behaviors:  none  Affect/Mood: good grooming, full facial expressions, normal speech pattern and content, normal thought patterns, normal perception, good insight, normal reasoning  Last dose medication about 3 months ago.         Allergies: Azithromycin     Medications:   Current Outpatient Medications:     ALPRAZolam (XANAX) 0.5 MG Tab, Take 1 Tablet by mouth at bedtime as needed for Anxiety for up to 30 days., Disp: 30 Tablet, Rfl: 0    albuterol 108 (90 Base) MCG/ACT Aero Soln inhalation aerosol, INHALE 2 PUFFS BY MOUTH EVERY  "FOUR HOURS AS NEEDED FOR SHORTNESS OF BREATH., Disp: 6.7 Each, Rfl: 1    omeprazole (PRILOSEC) 20 MG delayed-release capsule, Take 20 mg by mouth every day., Disp: , Rfl:       ROS:  Review of Systems   Constitutional:  Negative for chills and fever.   Respiratory:  Negative for cough and shortness of breath.    Cardiovascular:  Negative for chest pain, palpitations, orthopnea and leg swelling.       Objective:     Exam:  /68   Pulse (!) 48   Temp 36.2 °C (97.2 °F) (Temporal)   Ht 1.854 m (6' 1\")   Wt 89.2 kg (196 lb 9.6 oz)   SpO2 97%   BMI 25.94 kg/m²  Body mass index is 25.94 kg/m².    Physical Exam  Constitutional:       Appearance: He is normal weight.   Eyes:      Pupils: Pupils are equal, round, and reactive to light.   Cardiovascular:      Rate and Rhythm: Normal rate and regular rhythm.      Pulses: Normal pulses.      Heart sounds: Normal heart sounds.   Pulmonary:      Effort: Pulmonary effort is normal.      Breath sounds: Normal breath sounds.   Neurological:      Mental Status: He is alert and oriented to person, place, and time.   Psychiatric:         Mood and Affect: Mood normal.         Behavior: Behavior normal.           Assessment & Plan:     Diego farr 44 y.o. male with the following -       1. Anxiety  2. Benzodiazepine dependence (HCC)  Chronic, stable  A prescription for 30 tablets of Xanax has been issued. Additionally, a urine drug screen will be conducted.  - ALPRAZolam (XANAX) 0.5 MG Tab; Take 1 Tablet by mouth at bedtime as needed for Anxiety for up to 30 days.  Dispense: 30 Tablet; Refill: 0    3. Screening examination for sexually transmitted disease  - HIV test previously ordered  - T.PALLIDUM AB KIERSTEN (SCREENING)  - Chlamydia/GC, PCR (Urine); Future        Anticipatory guidance included the following: Patient counseled about skin care, diet, supplements, smoking, drugs/alcohol use, safe sex and exercise.     Return in about 3 months (around 9/14/2024), or if symptoms worsen " or fail to improve.    Please note that this dictation was created using voice recognition software. I have made every reasonable attempt to correct obvious errors, but I expect that there are errors of grammar and possibly content that I did not discover before finalizing the note.      I have placed the below orders and discussed them with an approved delegating provider.  The MA is performing the below orders under the direction of Dr. Renner.

## 2024-06-15 LAB
C TRACH DNA SPEC QL NAA+PROBE: NEGATIVE
N GONORRHOEA DNA SPEC QL NAA+PROBE: NEGATIVE
SPECIMEN SOURCE: NORMAL

## 2024-06-23 ENCOUNTER — APPOINTMENT (OUTPATIENT)
Dept: RADIOLOGY | Facility: MEDICAL CENTER | Age: 44
End: 2024-06-23
Attending: EMERGENCY MEDICINE
Payer: COMMERCIAL

## 2024-06-23 ENCOUNTER — HOSPITAL ENCOUNTER (EMERGENCY)
Facility: MEDICAL CENTER | Age: 44
End: 2024-06-23
Attending: EMERGENCY MEDICINE
Payer: COMMERCIAL

## 2024-06-23 ENCOUNTER — PHARMACY VISIT (OUTPATIENT)
Dept: PHARMACY | Facility: MEDICAL CENTER | Age: 44
End: 2024-06-23
Payer: COMMERCIAL

## 2024-06-23 VITALS
RESPIRATION RATE: 16 BRPM | WEIGHT: 196.21 LBS | DIASTOLIC BLOOD PRESSURE: 78 MMHG | OXYGEN SATURATION: 97 % | BODY MASS INDEX: 26 KG/M2 | HEART RATE: 46 BPM | SYSTOLIC BLOOD PRESSURE: 141 MMHG | TEMPERATURE: 98.7 F | HEIGHT: 73 IN

## 2024-06-23 DIAGNOSIS — M66.0 BAKER'S CYST, RUPTURED: ICD-10-CM

## 2024-06-23 DIAGNOSIS — R10.13 EPIGASTRIC PAIN: ICD-10-CM

## 2024-06-23 LAB
ALBUMIN SERPL BCP-MCNC: 3.9 G/DL (ref 3.2–4.9)
ALBUMIN/GLOB SERPL: 1.6 G/DL
ALP SERPL-CCNC: 77 U/L (ref 30–99)
ALT SERPL-CCNC: 23 U/L (ref 2–50)
ANION GAP SERPL CALC-SCNC: 11 MMOL/L (ref 7–16)
AST SERPL-CCNC: 25 U/L (ref 12–45)
BASOPHILS # BLD AUTO: 1.1 % (ref 0–1.8)
BASOPHILS # BLD: 0.1 K/UL (ref 0–0.12)
BILIRUB SERPL-MCNC: 0.3 MG/DL (ref 0.1–1.5)
BUN SERPL-MCNC: 11 MG/DL (ref 8–22)
CALCIUM ALBUM COR SERPL-MCNC: 8.7 MG/DL (ref 8.5–10.5)
CALCIUM SERPL-MCNC: 8.6 MG/DL (ref 8.5–10.5)
CHLORIDE SERPL-SCNC: 105 MMOL/L (ref 96–112)
CO2 SERPL-SCNC: 20 MMOL/L (ref 20–33)
CREAT SERPL-MCNC: 0.74 MG/DL (ref 0.5–1.4)
EOSINOPHIL # BLD AUTO: 0.26 K/UL (ref 0–0.51)
EOSINOPHIL NFR BLD: 2.9 % (ref 0–6.9)
ERYTHROCYTE [DISTWIDTH] IN BLOOD BY AUTOMATED COUNT: 42.3 FL (ref 35.9–50)
GFR SERPLBLD CREATININE-BSD FMLA CKD-EPI: 115 ML/MIN/1.73 M 2
GLOBULIN SER CALC-MCNC: 2.4 G/DL (ref 1.9–3.5)
GLUCOSE SERPL-MCNC: 118 MG/DL (ref 65–99)
HCT VFR BLD AUTO: 45.4 % (ref 42–52)
HGB BLD-MCNC: 15.6 G/DL (ref 14–18)
IMM GRANULOCYTES # BLD AUTO: 0.03 K/UL (ref 0–0.11)
IMM GRANULOCYTES NFR BLD AUTO: 0.3 % (ref 0–0.9)
LIPASE SERPL-CCNC: 23 U/L (ref 11–82)
LYMPHOCYTES # BLD AUTO: 2.87 K/UL (ref 1–4.8)
LYMPHOCYTES NFR BLD: 32.2 % (ref 22–41)
MCH RBC QN AUTO: 29.9 PG (ref 27–33)
MCHC RBC AUTO-ENTMCNC: 34.4 G/DL (ref 32.3–36.5)
MCV RBC AUTO: 87 FL (ref 81.4–97.8)
MONOCYTES # BLD AUTO: 0.75 K/UL (ref 0–0.85)
MONOCYTES NFR BLD AUTO: 8.4 % (ref 0–13.4)
NEUTROPHILS # BLD AUTO: 4.9 K/UL (ref 1.82–7.42)
NEUTROPHILS NFR BLD: 55.1 % (ref 44–72)
NRBC # BLD AUTO: 0 K/UL
NRBC BLD-RTO: 0 /100 WBC (ref 0–0.2)
PLATELET # BLD AUTO: 306 K/UL (ref 164–446)
PMV BLD AUTO: 9 FL (ref 9–12.9)
POTASSIUM SERPL-SCNC: 4.1 MMOL/L (ref 3.6–5.5)
PROT SERPL-MCNC: 6.3 G/DL (ref 6–8.2)
RBC # BLD AUTO: 5.22 M/UL (ref 4.7–6.1)
SODIUM SERPL-SCNC: 136 MMOL/L (ref 135–145)
WBC # BLD AUTO: 8.9 K/UL (ref 4.8–10.8)

## 2024-06-23 PROCEDURE — 99284 EMERGENCY DEPT VISIT MOD MDM: CPT

## 2024-06-23 PROCEDURE — RXMED WILLOW AMBULATORY MEDICATION CHARGE: Performed by: EMERGENCY MEDICINE

## 2024-06-23 PROCEDURE — 80053 COMPREHEN METABOLIC PANEL: CPT

## 2024-06-23 PROCEDURE — 36415 COLL VENOUS BLD VENIPUNCTURE: CPT

## 2024-06-23 PROCEDURE — A9270 NON-COVERED ITEM OR SERVICE: HCPCS | Performed by: EMERGENCY MEDICINE

## 2024-06-23 PROCEDURE — 85025 COMPLETE CBC W/AUTO DIFF WBC: CPT

## 2024-06-23 PROCEDURE — 700111 HCHG RX REV CODE 636 W/ 250 OVERRIDE (IP): Mod: JZ | Performed by: EMERGENCY MEDICINE

## 2024-06-23 PROCEDURE — 96372 THER/PROPH/DIAG INJ SC/IM: CPT

## 2024-06-23 PROCEDURE — 93971 EXTREMITY STUDY: CPT | Mod: LT

## 2024-06-23 PROCEDURE — 700102 HCHG RX REV CODE 250 W/ 637 OVERRIDE(OP): Performed by: EMERGENCY MEDICINE

## 2024-06-23 PROCEDURE — 83690 ASSAY OF LIPASE: CPT

## 2024-06-23 RX ORDER — HYDROCODONE BITARTRATE AND ACETAMINOPHEN 5; 325 MG/1; MG/1
1 TABLET ORAL ONCE
Status: COMPLETED | OUTPATIENT
Start: 2024-06-23 | End: 2024-06-23

## 2024-06-23 RX ORDER — HYDROCODONE BITARTRATE AND ACETAMINOPHEN 5; 325 MG/1; MG/1
1 TABLET ORAL EVERY 6 HOURS PRN
Qty: 7 TABLET | Refills: 0 | Status: SHIPPED | OUTPATIENT
Start: 2024-06-23 | End: 2024-06-26

## 2024-06-23 RX ORDER — KETOROLAC TROMETHAMINE 15 MG/ML
15 INJECTION, SOLUTION INTRAMUSCULAR; INTRAVENOUS ONCE
Status: COMPLETED | OUTPATIENT
Start: 2024-06-23 | End: 2024-06-23

## 2024-06-23 RX ADMIN — KETOROLAC TROMETHAMINE 15 MG: 15 INJECTION, SOLUTION INTRAMUSCULAR; INTRAVENOUS at 19:44

## 2024-06-23 RX ADMIN — HYDROCODONE BITARTRATE AND ACETAMINOPHEN 1 TABLET: 5; 325 TABLET ORAL at 19:35

## 2024-06-23 ASSESSMENT — FIBROSIS 4 INDEX: FIB4 SCORE: 0.66

## 2024-06-23 ASSESSMENT — PAIN DESCRIPTION - PAIN TYPE
TYPE: ACUTE PAIN
TYPE: ACUTE PAIN

## 2024-06-24 NOTE — ED PROVIDER NOTES
ED Provider Note    CHIEF COMPLAINT  Chief Complaint   Patient presents with    Knee Pain     Pt was wearing knee pads for work on Friday, noticed pain in back of left knee as he took it off. Next day noticed swelling, and has had pain since through back of knee radiating up and down leg. Pain increases with ambulation.        EXTERNAL RECORDS REVIEWED  Outpatient Notes patient has a history of alpha-1 antitrypsin anxiety    HPI/ROS  LIMITATION TO HISTORY   Select: : None  OUTSIDE HISTORIAN(S):  luke    Diego Abarca is a 44 y.o. male who presents to the emergency department with a chief complaint of left leg pain.  He states on Friday he put some kneepads on only for like an hour at work but they are pretty tight when he took it off he started having pain to the posterior aspect of the knee, he states he noted some swelling and the pain is just gotten worse.  He states it hurts to flex and extend and there is pain behind the knee on the left side kind of extends up into the thigh as well as in the calf.  No actual swelling redness or induration.    He also states that every now and then he gets his epigastric discomfort sometimes related to eating sometimes not sometimes pain goes in the lower abdomen not really associate with nausea vomiting diarrhea or constipation but he is almost a daily drinker and just wanted make sure everything was okay.    PAST MEDICAL HISTORY   has a past medical history of Alpha 1-antitrypsin PiMS phenotype, Anxiety, Asthma, Chickenpox, Constipation, [place] measles, History of gout, Indigestion, Irritable bowel syndrome (IBS), Mumps, PONV (postoperative nausea and vomiting), and Right knee pain (02/27/2024).    SURGICAL HISTORY   has a past surgical history that includes knee arthroscopy (Left, 2002); cyst excision (01/20/2021); colonoscopy; endoscopy; and meniscectomy, knee, arthroscopic (Right, 4/3/2024).    FAMILY HISTORY  Family History   Problem Relation Age of Onset    No Known  "Problems Mother     Lung Disease Maternal Grandfather         alpha-1 antitrypsin    Diabetes Maternal Grandfather     Heart Disease Maternal Grandfather     Cancer Child         leukemia type B    Stroke Other     Lung Disease Paternal Grandfather     Drug abuse Neg Hx     Alcohol abuse Neg Hx        SOCIAL HISTORY  Social History     Tobacco Use    Smoking status: Never    Smokeless tobacco: Never   Vaping Use    Vaping status: Never Used   Substance and Sexual Activity    Alcohol use: Yes     Alcohol/week: 1.2 oz     Types: 2 Standard drinks or equivalent per week     Comment: \"a few beers a day\"    Drug use: Yes     Types: Inhaled     Comment: Marijuana-daily    Sexual activity: Yes     Partners: Female     Comment: : 2010       CURRENT MEDICATIONS  Home Medications       Reviewed by Kerri Lamar R.N. (Registered Nurse) on 06/23/24 at 1743  Med List Status: Partial     Medication Last Dose Status   albuterol 108 (90 Base) MCG/ACT Aero Soln inhalation aerosol  Active   ALPRAZolam (XANAX) 0.5 MG Tab  Active   omeprazole (PRILOSEC) 20 MG delayed-release capsule  Active                    ALLERGIES  Allergies   Allergen Reactions    Azithromycin Vomiting and Nausea     Had to go to ER.       PHYSICAL EXAM  VITAL SIGNS: BP (!) 159/77   Pulse 74   Temp 37.1 °C (98.8 °F) (Temporal)   Resp 16   Ht 1.854 m (6' 1\")   Wt 89 kg (196 lb 3.4 oz)   SpO2 97%   BMI 25.89 kg/m²    Pulse OX: Pulse Oxygen level is normal on room air  Constitutional: Alert in no apparent distress.  HENT: Normocephalic, Atraumatic, MMM  Eyes: PERound. Conjunctiva normal, non-icteric.   Heart: Regular rate and rhythm, intact distal pulses   Lungs: Symmetrical movement, no resp distress   Abdomen: Non-tender, non-distended, normal bowel sounds  EXT/Back some tenderness to the posterior aspect of the left knee no warmth induration or erythema little bit of swelling on the lateral aspect but no evidence of fluctuance, DP pulse " 2+ distally full extension and near full flexion just discomfort on exam mild tenderness to the calf as well  Skin: Warm, Dry, No erythema, No rash.   Neurologic: Alert and oriented, Grossly non-focal.       EKG/LABS  Labs Reviewed   COMP METABOLIC PANEL - Abnormal; Notable for the following components:       Result Value    Glucose 118 (*)     All other components within normal limits   CBC WITH DIFFERENTIAL   LIPASE   ESTIMATED GFR       I have independently interpreted this EKG    RADIOLOGY/PROCEDURES   I have independently interpreted the diagnostic imaging associated with this visit and am waiting the final reading from the radiologist.   My preliminary interpretation is as follows:     Ultrasound of the left lower extremity no evidence of DVT there is a fluid collection in the area of contents potentially consistent with a Baker's cyst versus Baker's cyst rupture    Radiologist interpretation:  US-EXTREMITY VENOUS LOWER UNILAT LEFT   Final Result      FINDINGS:   Left lower extremity.    All veins demonstrate complete color filling and compressibility with    normal venous flow dynamics including spontaneous flow and respiratory    phasicity.    No deep venous thrombosis.       Flow was evaluated in the contralateral common femoral vein and normal    venous flow dynamics including spontaneous flow and respiratory phasic    variation were demonstrated.       A 1.5 x 0.7 cm ill-defined hypoechoic subcutaneous structure in the lateral    knee.    COURSE & MEDICAL DECISION MAKING    ASSESSMENT, COURSE AND PLAN  Care Narrative:     Patient is a 44-year-old male presents emerged part with pain behind the knee after wearing a knee pad, I suspect either had a Baker's cyst or Baker's cyst rupture, however he does have some tenderness extends down to the calf history of alcohol dependence will evaluate for DVT with an ultrasound also evaluate for ruptured Baker's cyst laboratory analysis because patient complaining of  intermittent abdominal pain but his abdomen soft nontender at this time could be an alcoholic gastritis pancreatitis I doubt gallstones he states sometimes related to pain but there is no right upper quadrant tenderness.    DISPOSITION AND DISCUSSIONS  7:32 PM  I reassessed patient at the bedside he is resting comfortably is requesting something pain which will be given prior to discharge, we discussed likely ruptured Baker's cyst no evidence of actual infection, there is also no evidence of pancreatitis or elevated LFTs.  We discussed diet changes in the setting of possible gastritis and medications over-the-counter.  As well as NSAID as needed for his pain in his left knee and ice packs for the next few days.  Return precautions for any new worsening issues and a stronger pain med just for a few nights to help him sleep.    In prescribing controlled substances to this patient, I certify that I have obtained and reviewed the medical history of Diego Abarca. I have also made a good torey effort to obtain applicable records from other providers who have treated the patient and records did not demonstrate any increased risk of substance abuse that would prevent me from prescribing controlled substances.     I have conducted a physical exam and documented it. I have reviewed Mr. Abarca’s prescription history as maintained by the [place] Prescription Monitoring Program.     I have assessed the patient’s risk for abuse, dependency, and addiction using the validated Opioid Risk Tool available at https://www.mdcalc.com/yejhri-qkbc-buue-ort-narcotic-abuse. 1    Given the above, I believe the benefits of controlled substance therapy outweigh the risks. The reasons for prescribing controlled substances include non-narcotic, oral analgesic alternatives have been inadequate for pain control. Accordingly, I have discussed the risk and benefits, treatment plan, and alternative therapies with the patient.       I have  discussed management of the patient with the following physicians and CIERA's:  none    Discussion of management with other Q or appropriate source(s): None     Escalation of care considered, and ultimately not performed:acute inpatient care management, however at this time, the patient is most appropriate for outpatient management    Barriers to care at this time, including but not limited to:  na .     Decision tools and prescription drugs considered including, but not limited to: Pain Medications were prescribed .    The patient will return for new or worsening symptoms and is stable at the time of discharge.    The patient is referred to a primary physician for blood pressure management, diabetic screening, and for all other preventative health concerns.    DISPOSITION:  Patient will be discharged home in stable condition.    FOLLOW UP:  Chanel Francis A.P.R.NEliecer  [facility] [address] [place] [id number] [phone]    Schedule an appointment as soon as possible for a visit       [facility], Emergency Dept  [address] [facility] [address] [phone]    If symptoms worsen      OUTPATIENT MEDICATIONS:  Discharge Medication List as of 6/23/2024  7:41 PM        START taking these medications    Details   HYDROcodone-acetaminophen (NORCO) 5-325 MG Tab per tablet Take 1 Tablet by mouth every 6 hours as needed (severe pain) for up to 3 days., Disp-7 Tablet, R-0, Normal               FINAL DIAGNOSIS  1. Baker's cyst, ruptured    2. Epigastric pain           Electronically signed by: Marian Zayas M.D., 6/23/2024 6:25 PM

## 2024-06-24 NOTE — ED TRIAGE NOTES
Chief Complaint   Patient presents with    Knee Pain     Pt was wearing knee pads for work on Friday, noticed pain in back of left knee as he took it off. Next day noticed swelling, and has had pain since through back of knee radiating up and down leg. Pain increases with ambulation.      Pt ambulatory to triage for above complaint.      Pt is alert/oriented and follows commands. Pt speaking in full sentences and responds appropriately to questions. No acute distress noted in triage and respirations are even and unlabored.     Pt placed in lobby and educated on triage process. Pt encouraged to alert staff for any changes in condition.   
Dillon Quezada Y (DO)

## 2024-06-24 NOTE — ED NOTES
Pt medicated for pain per MAR. Pt provided discharge instructions and follow-up information. Pt verbalized understanding and all questions answered. Pt ambulated from ED with steady gait carrying all belongings.

## 2024-06-24 NOTE — ED NOTES
Pt ambulated to the room without assistance. Changed into a gown and placed on SpO2 and BP monitors. Call light within reach, Family at bedside. No further complaints at this time. Chart up for ERP.

## 2024-08-04 ENCOUNTER — OFFICE VISIT (OUTPATIENT)
Dept: URGENT CARE | Facility: CLINIC | Age: 44
End: 2024-08-04
Payer: COMMERCIAL

## 2024-08-04 VITALS
RESPIRATION RATE: 16 BRPM | WEIGHT: 194.4 LBS | OXYGEN SATURATION: 97 % | HEIGHT: 73 IN | HEART RATE: 60 BPM | BODY MASS INDEX: 25.76 KG/M2 | DIASTOLIC BLOOD PRESSURE: 78 MMHG | SYSTOLIC BLOOD PRESSURE: 120 MMHG | TEMPERATURE: 97 F

## 2024-08-04 DIAGNOSIS — B96.89 ACUTE BACTERIAL SINUSITIS: ICD-10-CM

## 2024-08-04 DIAGNOSIS — J01.90 ACUTE BACTERIAL SINUSITIS: ICD-10-CM

## 2024-08-04 DIAGNOSIS — H66.001 NON-RECURRENT ACUTE SUPPURATIVE OTITIS MEDIA OF RIGHT EAR WITHOUT SPONTANEOUS RUPTURE OF TYMPANIC MEMBRANE: ICD-10-CM

## 2024-08-04 PROCEDURE — 99213 OFFICE O/P EST LOW 20 MIN: CPT | Performed by: PHYSICIAN ASSISTANT

## 2024-08-04 PROCEDURE — 3078F DIAST BP <80 MM HG: CPT | Performed by: PHYSICIAN ASSISTANT

## 2024-08-04 PROCEDURE — 3074F SYST BP LT 130 MM HG: CPT | Performed by: PHYSICIAN ASSISTANT

## 2024-08-04 ASSESSMENT — ENCOUNTER SYMPTOMS
VOMITING: 0
COUGH: 1
MYALGIAS: 0
CONSTIPATION: 0
HEADACHES: 0
ABDOMINAL PAIN: 0
NAUSEA: 0
FEVER: 0
SHORTNESS OF BREATH: 0
DIARRHEA: 0
SINUS PAIN: 1
EYE PAIN: 0
SORE THROAT: 0
CHILLS: 0

## 2024-08-04 ASSESSMENT — FIBROSIS 4 INDEX: FIB4 SCORE: 0.73

## 2024-08-04 NOTE — PROGRESS NOTES
"Subjective:   Diego Abarca is a 44 y.o. male who presents for Congestion (X6days, Colored and bloody mucous, congestion, bloody nose in morning, cough, head congestion, right ear pain)      Right-sided ear pain and pressure, right-sided maxillary sinus pain and pressure, had a bloody nose x 3 yesterday with expectoration of bloody sputum.  Recently his son had a head cold and symptoms started with this head cold.  He has been diving and swimming and noted more severe right-sided ear pain in the last 2 days    Review of Systems   Constitutional:  Positive for malaise/fatigue. Negative for chills and fever.   HENT:  Positive for congestion, ear pain, nosebleeds and sinus pain. Negative for sore throat.    Eyes:  Negative for pain.   Respiratory:  Positive for cough. Negative for shortness of breath.    Cardiovascular:  Negative for chest pain.   Gastrointestinal:  Negative for abdominal pain, constipation, diarrhea, nausea and vomiting.   Genitourinary:  Negative for dysuria.   Musculoskeletal:  Negative for myalgias.   Skin:  Negative for rash.   Neurological:  Negative for headaches.       Medications, Allergies, and current problem list reviewed today in Epic.     Objective:     /78 (BP Location: Left arm, Patient Position: Sitting, BP Cuff Size: Adult)   Pulse 60   Temp 36.1 °C (97 °F) (Temporal)   Resp 16   Ht 1.854 m (6' 1\")   Wt 88.2 kg (194 lb 6.4 oz)   SpO2 97%     Physical Exam  Vitals reviewed.   Constitutional:       Appearance: Normal appearance.   HENT:      Head: Normocephalic and atraumatic.      Right Ear: Ear canal and external ear normal.      Left Ear: Tympanic membrane, ear canal and external ear normal.      Ears:      Comments: Erythematous and bulging right TM     Nose: Congestion and rhinorrhea present.      Mouth/Throat:      Mouth: Mucous membranes are moist.      Pharynx: Oropharynx is clear.   Eyes:      Conjunctiva/sclera: Conjunctivae normal.      Pupils: Pupils are " equal, round, and reactive to light.   Cardiovascular:      Rate and Rhythm: Normal rate and regular rhythm.   Pulmonary:      Effort: Pulmonary effort is normal.      Breath sounds: Normal breath sounds.   Musculoskeletal:      Cervical back: Normal range of motion.   Lymphadenopathy:      Cervical: No cervical adenopathy.   Skin:     General: Skin is warm and dry.      Capillary Refill: Capillary refill takes less than 2 seconds.   Neurological:      Mental Status: He is alert and oriented to person, place, and time.         Assessment/Plan:     Diagnosis and associated orders:     1. Acute bacterial sinusitis  amoxicillin-clavulanate (AUGMENTIN) 875-125 MG Tab      2. Non-recurrent acute suppurative otitis media of right ear without spontaneous rupture of tympanic membrane           Comments/MDM:     Consider antihistamine, discussed management of epistaxis, consider humidifier.  Avoid any astringent nasal sprays or steroid nasal sprays, follow-up as needed         Differential diagnosis, natural history, supportive care, and indications for immediate follow-up discussed.    Advised the patient to follow-up with the primary care physician for recheck, reevaluation, and consideration of further management.    Please note that this dictation was created using voice recognition software. I have made a reasonable attempt to correct obvious errors, but I expect that there are errors of grammar and possibly content that I did not discover before finalizing the note.    This note was electronically signed by Lefty Hagen PA-C

## 2024-09-09 ENCOUNTER — OFFICE VISIT (OUTPATIENT)
Dept: URGENT CARE | Facility: CLINIC | Age: 44
End: 2024-09-09
Payer: COMMERCIAL

## 2024-09-09 ENCOUNTER — HOSPITAL ENCOUNTER (OUTPATIENT)
Facility: MEDICAL CENTER | Age: 44
End: 2024-09-09
Attending: NURSE PRACTITIONER
Payer: COMMERCIAL

## 2024-09-09 VITALS
OXYGEN SATURATION: 99 % | HEIGHT: 73 IN | DIASTOLIC BLOOD PRESSURE: 76 MMHG | WEIGHT: 185 LBS | HEART RATE: 64 BPM | BODY MASS INDEX: 24.52 KG/M2 | TEMPERATURE: 96.7 F | SYSTOLIC BLOOD PRESSURE: 116 MMHG | RESPIRATION RATE: 16 BRPM

## 2024-09-09 DIAGNOSIS — Z11.3 SCREEN FOR STD (SEXUALLY TRANSMITTED DISEASE): ICD-10-CM

## 2024-09-09 DIAGNOSIS — Z72.51 HIGH RISK SEXUAL BEHAVIOR, UNSPECIFIED TYPE: ICD-10-CM

## 2024-09-09 DIAGNOSIS — R21 SCROTAL RASH: ICD-10-CM

## 2024-09-09 PROCEDURE — 99214 OFFICE O/P EST MOD 30 MIN: CPT | Performed by: NURSE PRACTITIONER

## 2024-09-09 PROCEDURE — 3074F SYST BP LT 130 MM HG: CPT | Performed by: NURSE PRACTITIONER

## 2024-09-09 PROCEDURE — 87491 CHLMYD TRACH DNA AMP PROBE: CPT

## 2024-09-09 PROCEDURE — 87661 TRICHOMONAS VAGINALIS AMPLIF: CPT

## 2024-09-09 PROCEDURE — 87591 N.GONORRHOEAE DNA AMP PROB: CPT

## 2024-09-09 PROCEDURE — 3078F DIAST BP <80 MM HG: CPT | Performed by: NURSE PRACTITIONER

## 2024-09-09 RX ORDER — TRIAMCINOLONE ACETONIDE 1 MG/G
1 CREAM TOPICAL 2 TIMES DAILY
Qty: 28.4 G | Refills: 0 | Status: SHIPPED | OUTPATIENT
Start: 2024-09-09 | End: 2024-09-16

## 2024-09-09 RX ORDER — KETOCONAZOLE 20 MG/G
1 CREAM TOPICAL DAILY
Qty: 30 G | Refills: 0 | Status: SHIPPED | OUTPATIENT
Start: 2024-09-09 | End: 2024-09-23

## 2024-09-09 ASSESSMENT — VISUAL ACUITY: OU: 1

## 2024-09-09 ASSESSMENT — ENCOUNTER SYMPTOMS
FEVER: 0
CONSTITUTIONAL NEGATIVE: 1
CHILLS: 0

## 2024-09-09 ASSESSMENT — FIBROSIS 4 INDEX: FIB4 SCORE: 0.73

## 2024-09-10 ENCOUNTER — HOSPITAL ENCOUNTER (OUTPATIENT)
Dept: LAB | Facility: MEDICAL CENTER | Age: 44
End: 2024-09-10
Attending: NURSE PRACTITIONER
Payer: COMMERCIAL

## 2024-09-10 DIAGNOSIS — Z11.3 SCREEN FOR STD (SEXUALLY TRANSMITTED DISEASE): ICD-10-CM

## 2024-09-10 DIAGNOSIS — Z72.51 HIGH RISK SEXUAL BEHAVIOR, UNSPECIFIED TYPE: ICD-10-CM

## 2024-09-10 LAB
C TRACH DNA SPEC QL NAA+PROBE: NEGATIVE
HBV CORE AB SERPL QL IA: NONREACTIVE
HBV SURFACE AB SERPL IA-ACNC: 44.7 MIU/ML (ref 0–10)
HBV SURFACE AG SER QL: NORMAL
HCV AB SER QL: NORMAL
HIV 1+2 AB+HIV1 P24 AG SERPL QL IA: NORMAL
N GONORRHOEA DNA SPEC QL NAA+PROBE: NEGATIVE
SPECIMEN SOURCE: NORMAL
T PALLIDUM AB SER QL IA: NORMAL

## 2024-09-10 PROCEDURE — 86704 HEP B CORE ANTIBODY TOTAL: CPT

## 2024-09-10 PROCEDURE — 86803 HEPATITIS C AB TEST: CPT

## 2024-09-10 PROCEDURE — 36415 COLL VENOUS BLD VENIPUNCTURE: CPT

## 2024-09-10 PROCEDURE — 87389 HIV-1 AG W/HIV-1&-2 AB AG IA: CPT

## 2024-09-10 PROCEDURE — 87340 HEPATITIS B SURFACE AG IA: CPT

## 2024-09-10 PROCEDURE — 86780 TREPONEMA PALLIDUM: CPT

## 2024-09-10 PROCEDURE — 86706 HEP B SURFACE ANTIBODY: CPT

## 2024-09-10 NOTE — PROGRESS NOTES
Subjective:     Diego Abarca is a 44 y.o. male who presents for Exposure to STD (X1wk, rash on testicles)       Rash  This is a new problem. The problem has been gradually worsening since onset. Pertinent negatives include no fever.     Patient reports 1 week ago, he started to develop itchiness at his bilateral scrotum.  Has mild redness.  Denies pain.  Has been sweating from the heat.    Reports he is sexually active with his wife.  However, they both participate in swinging activities and patient requests STD screening.    Patient reports previous history of genital herpes and cold sores.    Denies other symptoms at this time.    Review of Systems   Constitutional: Negative.  Negative for chills and fever.   Genitourinary:  Negative for dysuria, frequency and urgency.        Denies discharge   Skin:  Positive for itching and rash.   All other systems reviewed and are negative.    Refer to [facility] for additional details.    During this visit, appropriate PPE was worn, and hand hygiene was performed.    PMH:  has a past medical history of Alpha 1-antitrypsin PiMS phenotype, Anxiety, Asthma, Chickenpox, Constipation, [place] measles, History of gout, Indigestion, Irritable bowel syndrome (IBS), Mumps, PONV (postoperative nausea and vomiting), and Right knee pain (02/27/2024).    He has no past medical history of Acute nasopharyngitis, Anesthesia, Anginal syndrome (HCC), Arrhythmia, Arthritis, Blood clotting disorder ([facility]), Breath shortness, Bronchitis, Cancer (HCC), Carcinoma in situ of respiratory system, Cataract, Congestive heart failure (HCC), Continuous ambulatory peritoneal dialysis status ([facility]), Coughing blood, Dental disorder, Diabetes (HCC), Dialysis patient ([facility]), Disorder of thyroid, Emphysema of lung (HCC), Glaucoma, Gynecological disorder, Heart burn, Heart murmur, Heart valve disease, Hemorrhagic disorder (HCC), Hepatitis A, Hepatitis B, Hepatitis C, Hiatus hernia syndrome, High cholesterol,  "Hypertension, Jaundice, Myocardial infarct (HCC), Pacemaker, Pain, Pneumonia, Pregnant, Psychiatric problem, Renal disorder, Rheumatic fever, Seizure (HCC), Sleep apnea, Snoring, Stroke (HCC), Tuberculosis, Urinary bladder disorder, or Urinary incontinence.    MEDS:   Current Outpatient Medications:     ALPRAZOLAM PO, Take  by mouth., Disp: , Rfl:     ketoconazole (NIZORAL) 2 % Cream, Apply 1 Application topically every day for 14 days., Disp: 30 g, Rfl: 0    triamcinolone acetonide (KENALOG) 0.1 % Cream, Apply 1 Application topically 2 times a day for 7 days., Disp: 28.4 g, Rfl: 0    ALLERGIES:   Allergies   Allergen Reactions    Azithromycin Vomiting and Nausea     Had to go to ER.     SURGHX:   Past Surgical History:   Procedure Laterality Date    MENISCECTOMY, KNEE, ARTHROSCOPIC Right 4/3/2024    Procedure: RIGHT KNEE ARTHROSCOPY PARTIAL MEDIAL MENISCECTOMY;  Surgeon: Conner Molina M.D.;  Location: SURGERY [facility];  Service: Orthopedics    CYST EXCISION  01/20/2021    RT ankle cyst    KNEE ARTHROSCOPY Left 2002    meniscus repair    COLONOSCOPY      ENDOSCOPY       SOCHX:  reports that he has never smoked. He has never used smokeless tobacco. He reports current alcohol use of about 1.2 oz of alcohol per week. He reports current drug use. Drug: Inhaled.    FH: Per HPI as applicable/pertinent.      Objective:     /76 (BP Location: Left arm, Patient Position: Sitting, BP Cuff Size: Adult)   Pulse 64   Temp 35.9 °C (96.7 °F) (Temporal)   Resp 16   Ht 1.854 m (6' 1\")   Wt 83.9 kg (185 lb)   SpO2 99%   BMI 24.41 kg/m²     Physical Exam  Nursing note reviewed.   Constitutional:       General: He is not in acute distress.     Appearance: He is well-developed. He is not ill-appearing or toxic-appearing.   Eyes:      General: Vision grossly intact.   Cardiovascular:      Rate and Rhythm: Normal rate.   Pulmonary:      Effort: Pulmonary effort is normal. No respiratory distress.   Genitourinary:   "   Penis: Normal and circumcised.       Comments: Mild erythema bilateral scrotum, no lesions, no tenderness    Musculoskeletal:         General: No deformity. Normal range of motion.   Skin:     Coloration: Skin is not pale.   Neurological:      Mental Status: He is alert and oriented to person, place, and time.      Motor: No weakness.   Psychiatric:         Behavior: Behavior normal. Behavior is cooperative.       Assessment/Plan:     1. Scrotal rash  - ketoconazole (NIZORAL) 2 % Cream; Apply 1 Application topically every day for 14 days.  Dispense: 30 g; Refill: 0  - triamcinolone acetonide (KENALOG) 0.1 % Cream; Apply 1 Application topically 2 times a day for 7 days.  Dispense: 28.4 g; Refill: 0    2. Screen for STD (sexually transmitted disease)  - Chlamydia/GC, PCR (Urine); Future  - TRICHOMONAS VAGINALIS BY TMA; Future  - HIV AG/AB Combo Assay Screening; Future  - T.Pallidum AB KIERSTEN (Screening); Future  - Hepatitis C Virus Antibody; Future  - HEP B Surface Antibody; Future  - Hep B Core AB Total; Future  - Hep B Surface Antigen; Future    3. High risk sexual behavior, unspecified type  - Chlamydia/GC, PCR (Urine); Future  - TRICHOMONAS VAGINALIS BY TMA; Future  - HIV AG/AB Combo Assay Screening; Future  - T.Pallidum AB KIERSTEN (Screening); Future  - Hepatitis C Virus Antibody; Future  - HEP B Surface Antibody; Future  - Hep B Core AB Total; Future  - Hep B Surface Antigen; Future    Rx as above sent electronically. Probable dermatitis vs jock itch. Keep area clean and dry.    STD screen pending. Patient is signed up for [employer] and approves of electronic communication of test results.  Will follow-up and treat as appropriate.  Advised to avoid sexual activity until results come back.    Monitor. Warning signs reviewed. Return precautions advised.     Differential diagnosis, natural history, supportive care, over-the-counter symptom management per 's instructions, close monitoring, and indications for  immediate follow-up discussed.     All questions answered. Patient agrees with the plan of care.    Discharge summary provided via [email or web address].    Billing note: moderate complexity (ordered tests) and moderate risk (Rx management). Established patient. [id number]. Please refer to LOS tool for details.

## 2024-09-11 LAB
SPEC CONTAINER SPEC: NORMAL
SPECIMEN SOURCE: NORMAL
T VAGINALIS RRNA SPEC QL NAA+PROBE: NEGATIVE

## 2024-09-27 ENCOUNTER — APPOINTMENT (OUTPATIENT)
Dept: MEDICAL GROUP | Facility: MEDICAL CENTER | Age: 44
End: 2024-09-27
Payer: COMMERCIAL

## 2024-09-27 VITALS
TEMPERATURE: 97.6 F | WEIGHT: 201.4 LBS | SYSTOLIC BLOOD PRESSURE: 118 MMHG | HEART RATE: 55 BPM | HEIGHT: 73 IN | OXYGEN SATURATION: 96 % | BODY MASS INDEX: 26.69 KG/M2 | DIASTOLIC BLOOD PRESSURE: 82 MMHG

## 2024-09-27 DIAGNOSIS — R06.2 WHEEZING: ICD-10-CM

## 2024-09-27 DIAGNOSIS — R06.02 SHORTNESS OF BREATH: ICD-10-CM

## 2024-09-27 DIAGNOSIS — R10.30 LOWER ABDOMINAL PAIN: ICD-10-CM

## 2024-09-27 DIAGNOSIS — R19.7 DIARRHEA, UNSPECIFIED TYPE: ICD-10-CM

## 2024-09-27 DIAGNOSIS — F13.20 BENZODIAZEPINE DEPENDENCE (HCC): ICD-10-CM

## 2024-09-27 DIAGNOSIS — J45.20 MILD INTERMITTENT ASTHMA WITHOUT COMPLICATION: ICD-10-CM

## 2024-09-27 DIAGNOSIS — F41.9 ANXIETY: ICD-10-CM

## 2024-09-27 DIAGNOSIS — Z78.9 ALCOHOL USE: ICD-10-CM

## 2024-09-27 PROBLEM — K59.09 OTHER CONSTIPATION: Status: RESOLVED | Noted: 2022-03-15 | Resolved: 2024-09-27

## 2024-09-27 PROCEDURE — 99214 OFFICE O/P EST MOD 30 MIN: CPT

## 2024-09-27 PROCEDURE — 3074F SYST BP LT 130 MM HG: CPT

## 2024-09-27 PROCEDURE — 3079F DIAST BP 80-89 MM HG: CPT

## 2024-09-27 RX ORDER — ALPRAZOLAM 0.5 MG
0.5 TABLET ORAL
Qty: 30 TABLET | Refills: 0 | Status: SHIPPED | OUTPATIENT
Start: 2024-09-27 | End: 2024-10-27

## 2024-09-27 RX ORDER — FLUTICASONE FUROATE AND VILANTEROL TRIFENATATE 100; 25 UG/1; UG/1
1 POWDER RESPIRATORY (INHALATION) DAILY
Qty: 60 EACH | Refills: 5 | Status: SHIPPED | OUTPATIENT
Start: 2024-09-27

## 2024-09-27 RX ORDER — ALBUTEROL SULFATE 90 UG/1
2 INHALANT RESPIRATORY (INHALATION) EVERY 6 HOURS PRN
Qty: 8.5 G | Refills: 2 | Status: SHIPPED | OUTPATIENT
Start: 2024-09-27

## 2024-09-27 ASSESSMENT — ENCOUNTER SYMPTOMS
COUGH: 0
PALPITATIONS: 0
FEVER: 0
SHORTNESS OF BREATH: 0
CHILLS: 0
ORTHOPNEA: 0

## 2024-09-27 ASSESSMENT — FIBROSIS 4 INDEX: FIB4 SCORE: 0.73

## 2024-09-27 NOTE — PROGRESS NOTES
Subjective:     Verbal consent was acquired by the patient to use [employer] ambient listening note generation during this visit Yes    CC: Follow-Up (Pt states his stomach issues are getting worse. Pt would like to discuss a new inhaler.)      HPI:   Diego is a 44 y.o. male who presents today for:    History of Present Illness  The patient presents for evaluation of multiple medical concerns.    He has been experiencing chronic stomach issues, which he attributes to alcohol consumption. He often experiences loose stools the day after drinking, even if it's just a couple of beers. He underwent a colonoscopy at the age of 39 due to these stomach pain and diarrhea. Per the patient, everything was normal. He also experiences lower abdominal pain, predominantly on the right side intermittently when he needs to use the bathroom. He reports no presence of blood in his stool or dark stools. He had an anal fissure years ago but has not experienced anything similar since. He recalls one instance of constipation when he abstained from alcohol. Typically, his bowel movements occur three times a day and are generally soft and normal-looking and he has a bowel movement 2-3 times per day. He does not strain during bowel movements.    He uses an albuterol inhaler to manage occasional wheezing and shortness of breath. He has a history of mild asthma from childhood. He occasionally wakes up wheezing or short of breath, but this has not occurred recently. He feels that his albuterol has not been as effective as it once was.     He requests a refill of Xanax, which he uses as needed. He has been managing depression since the age of 10 and finds that sleep sometimes helps alleviate his symptoms. He typically tries to break his pills in half, but sometimes needs to take the whole pill.     He drinks alcohol a few times per week, normally drinking more on the weekends. He tries to limit it to 2-3 beers.          Allergies: Azithromycin  "    Medications:   Current Outpatient Medications:     fluticasone furoate-vilanterol (BREO ELLIPTA) 100-25 MCG/ACT AEROSOL POWDER, BREATH ACTIVATED, Inhale 1 Puff every day., Disp: 60 Each, Rfl: 5    albuterol 108 (90 Base) MCG/ACT Aero Soln inhalation aerosol, Inhale 2 Puffs every 6 hours as needed for Shortness of Breath., Disp: 8.5 g, Rfl: 2    ALPRAZolam (XANAX) 0.5 MG Tab, Take 1 Tablet by mouth one time as needed for Anxiety for up to 30 days., Disp: 30 Tablet, Rfl: 0      ROS:  Review of Systems   Constitutional:  Negative for chills and fever.   Respiratory:  Negative for cough and shortness of breath.    Cardiovascular:  Negative for chest pain, palpitations, orthopnea and leg swelling.       Objective:     Exam:  /82   Pulse (!) 55   Temp 36.4 °C (97.6 °F) (Temporal)   Ht 1.854 m (6' 1\")   Wt 91.4 kg (201 lb 6.4 oz)   SpO2 96%   BMI 26.57 kg/m²  Body mass index is 26.57 kg/m².    Physical Exam  Constitutional:       Appearance: He is normal weight.   Eyes:      Pupils: Pupils are equal, round, and reactive to light.   Cardiovascular:      Rate and Rhythm: Normal rate and regular rhythm.      Pulses: Normal pulses.      Heart sounds: Normal heart sounds.   Pulmonary:      Effort: Pulmonary effort is normal.      Breath sounds: Normal breath sounds.   Neurological:      Mental Status: He is alert and oriented to person, place, and time.   Psychiatric:         Mood and Affect: Mood normal.         Behavior: Behavior normal.           Assessment & Plan:     Diego farr 44 y.o. male with the following -       1. Diarrhea, unspecified type  2. Lower abdominal pain  Undiagnosed problem with uncertain prognosis  He reports intermittent lower abdominal pain, primarily on the right side, and loose stools, often triggered by alcohol consumption. No blood in stools or significant changes in bowel habits were noted. A referral to a gastroenterologist was suggested. In the interim, he was advised to monitor " his diet and identify potential triggers for his symptoms, such as alcohol or certain foods. Recommend following a LOW FOD map diet. A food journal could be beneficial in tracking these triggers.  - Referral to Gastroenterology    3. Alcohol use  Chronic, stable  We discussed alcohol cessation to help with lower abdominal pain and diarrhea.    4. Wheezing  5. Shortness of breath  6. Mild intermittent asthma without complication  Chronic, stable  He experiences wheezing, particularly when engaging in activities like bending down. He has a history of mild asthma and uses an albuterol inhaler as needed. A prescription for Breo, a once-daily inhaler, was provided. He was instructed to rinse his mouth after each use to prevent oral thrush. If symptoms resolve, he may discontinue use. However, if symptoms recur, he should resume use for one to two weeks. Albuterol can be used as needed. The prescription will be sent to [facility] on [address].  - fluticasone furoate-vilanterol (BREO ELLIPTA) 100-25 MCG/ACT AEROSOL POWDER, BREATH ACTIVATED; Inhale 1 Puff every day.  Dispense: 60 Each; Refill: 5  - albuterol 108 (90 Base) MCG/ACT Aero Soln inhalation aerosol; Inhale 2 Puffs every 6 hours as needed for Shortness of Breath.  Dispense: 8.5 g; Refill: 2    7. Anxiety  8. Benzodiazepine dependence (HCC)  Chronic, unstable  He reports intermittent episodes of anxiety and depression since childhood, with recent increased need for Xanax over the last month. He was encouraged to engage in relaxation techniques such as deep breathing, walking, and avoiding stressful environments. A refill for Xanax was provided. PDMP reviewed, no sign of misuse.  - ALPRAZolam (XANAX) 0.5 MG Tab; Take 1 Tablet by mouth one time as needed for Anxiety for up to 30 days.  Dispense: 30 Tablet; Refill: 0        Anticipatory guidance included the following: Patient counseled about skin care, diet, supplements, smoking, drugs/alcohol use, safe sex and exercise.      Return if symptoms worsen or fail to improve.    Please note that this dictation was created using voice recognition software. I have made every reasonable attempt to correct obvious errors, but I expect that there are errors of grammar and possibly content that I did not discover before finalizing the note.

## 2024-09-30 ENCOUNTER — APPOINTMENT (OUTPATIENT)
Dept: RADIOLOGY | Facility: IMAGING CENTER | Age: 44
End: 2024-09-30
Attending: PHYSICIAN ASSISTANT
Payer: COMMERCIAL

## 2024-09-30 ENCOUNTER — OFFICE VISIT (OUTPATIENT)
Dept: URGENT CARE | Facility: CLINIC | Age: 44
End: 2024-09-30
Payer: COMMERCIAL

## 2024-09-30 VITALS
OXYGEN SATURATION: 96 % | HEIGHT: 72 IN | WEIGHT: 198 LBS | TEMPERATURE: 97.7 F | HEART RATE: 65 BPM | RESPIRATION RATE: 16 BRPM | BODY MASS INDEX: 26.82 KG/M2 | SYSTOLIC BLOOD PRESSURE: 124 MMHG | DIASTOLIC BLOOD PRESSURE: 70 MMHG

## 2024-09-30 DIAGNOSIS — R21 RASH: ICD-10-CM

## 2024-09-30 DIAGNOSIS — M79.671 RIGHT FOOT PAIN: ICD-10-CM

## 2024-09-30 PROCEDURE — 73630 X-RAY EXAM OF FOOT: CPT | Mod: TC,RT | Performed by: PHYSICIAN ASSISTANT

## 2024-09-30 ASSESSMENT — ENCOUNTER SYMPTOMS: FLANK PAIN: 0

## 2024-09-30 ASSESSMENT — FIBROSIS 4 INDEX: FIB4 SCORE: 0.73

## 2024-09-30 NOTE — PROGRESS NOTES
Subjective:   Diego Abarca is a 44 y.o. male who presents today with   Chief Complaint   Patient presents with    Foot Pain     Right foot pain x 3 days  Red spots on both hands x 3 days       Foot Problem  This is a new problem. Episode onset: 3 days. The problem occurs constantly. The problem has been unchanged. Associated symptoms include a rash. He has tried nothing for the symptoms. The treatment provided no relief.     Patient states he has had similar rash to his hands in the past.  Patient does have significant history of right ankle surgery in the past.  No specific recent injury or trauma.  Describes feeling of tightness to the arch of the foot.    PMH:  has a past medical history of Alpha 1-antitrypsin PiMS phenotype, Anxiety, Asthma, Chickenpox, Constipation, [place] measles, History of gout, Indigestion, Irritable bowel syndrome (IBS), Mumps, PONV (postoperative nausea and vomiting), and Right knee pain (02/27/2024).    He has no past medical history of Acute nasopharyngitis, Anesthesia, Anginal syndrome (HCC), Arrhythmia, Arthritis, Blood clotting disorder (HCC), Breath shortness, Bronchitis, Cancer (HCC), Carcinoma in situ of respiratory system, Cataract, Congestive heart failure (HCC), Continuous ambulatory peritoneal dialysis status (HCC), Coughing blood, Dental disorder, Diabetes (HCC), Dialysis patient (HCC), Disorder of thyroid, Emphysema of lung (HCC), Glaucoma, Gynecological disorder, Heart burn, Heart murmur, Heart valve disease, Hemorrhagic disorder (HCC), Hepatitis A, Hepatitis B, Hepatitis C, Hiatus hernia syndrome, High cholesterol, Hypertension, Jaundice, Myocardial infarct (HCC), Pacemaker, Pain, Pneumonia, Pregnant, Psychiatric problem, Renal disorder, Rheumatic fever, Seizure (HCC), Sleep apnea, Snoring, Stroke (HCC), Tuberculosis, Urinary bladder disorder, or Urinary incontinence.  MEDS:   Current Outpatient Medications:     fluticasone furoate-vilanterol (BREO ELLIPTA) 100-25  "MCG/ACT AEROSOL POWDER, BREATH ACTIVATED, Inhale 1 Puff every day., Disp: 60 Each, Rfl: 5    ALPRAZolam (XANAX) 0.5 MG Tab, Take 1 Tablet by mouth one time as needed for Anxiety for up to 30 days., Disp: 30 Tablet, Rfl: 0    albuterol 108 (90 Base) MCG/ACT Aero Soln inhalation aerosol, Inhale 2 Puffs every 6 hours as needed for Shortness of Breath. (Patient not taking: Reported on 9/30/2024), Disp: 8.5 g, Rfl: 2  ALLERGIES:   Allergies   Allergen Reactions    Azithromycin Vomiting and Nausea     Had to go to ER.     SURGHX:   Past Surgical History:   Procedure Laterality Date    MENISCECTOMY, KNEE, ARTHROSCOPIC Right 4/3/2024    Procedure: RIGHT KNEE ARTHROSCOPY PARTIAL MEDIAL MENISCECTOMY;  Surgeon: Conner Molina M.D.;  Location: SURGERY [facility];  Service: Orthopedics    CYST EXCISION  01/20/2021    RT ankle cyst    KNEE ARTHROSCOPY Left 2002    meniscus repair    COLONOSCOPY      ENDOSCOPY       SOCHX:  reports that he has never smoked. He has never used smokeless tobacco. He reports current alcohol use of about 1.2 oz of alcohol per week. He reports current drug use. Drugs: Inhaled and Marijuana.  FH: Reviewed with patient, not pertinent to this visit.       Review of Systems   Genitourinary:  Negative for dysuria, flank pain, frequency, hematuria and urgency.   Musculoskeletal:         Right foot pain   Skin:  Positive for rash.        Objective:   /70 (BP Location: Left arm, Patient Position: Sitting, BP Cuff Size: Adult)   Pulse 65   Temp 36.5 °C (97.7 °F) (Temporal)   Resp 16   Ht 1.834 m (6' 0.2\")   Wt 89.8 kg (198 lb)   SpO2 96%   BMI 26.71 kg/m²   Physical Exam  Vitals and nursing note reviewed.   Constitutional:       General: He is not in acute distress.     Appearance: Normal appearance. He is well-developed. He is not ill-appearing or toxic-appearing.   HENT:      Head: Normocephalic and atraumatic.      Right Ear: Hearing normal.      Left Ear: Hearing normal. "   Cardiovascular:      Rate and Rhythm: Normal rate.   Pulmonary:      Effort: Pulmonary effort is normal.   Musculoskeletal:      Comments: Tenderness to palpation through the plantar aspect of right foot.  Neurovascular intact distally.  No bruising or swelling noted.  Slight tenderness to the dorsum of the right foot as well. NVI distally.  Surgical scar to the lateral aspect of the right ankle.   Skin:     General: Skin is warm and dry.             Comments: Dry eczematous lesions noted to the palms of the hands bilaterally couple to each hand.  No open wound and no drainage or discharge.   Neurological:      Mental Status: He is alert.      Coordination: Coordination normal.   Psychiatric:         Mood and Affect: Mood normal.       DX FOOT    FINDINGS:  There is no fracture or dislocation.  The visualized osseous structures are in anatomic alignment.  Mild first MTP degenerative changes.  Bone mineralization is age-appropriate..        IMPRESSION:     No acute osseous abnormality.     Mild degenerative changes in the first MTP joint.    Assessment/Plan:   Assessment    1. Right foot pain  - DX-FOOT-COMPLETE 3+ RIGHT; Future    2. Rash    Regarding rash would suggest use of triamcinolone and ketoconazole cream to the area to see if this helps alleviate symptoms as previously prescribed.  Would suggest using as previously prescribed for no longer than 2 weeks total.  Foot pain seems to be related with plantar fasciitis.  Will obtain x-ray given history with surgery, etc. appears consistent with tinea rash.  Discussed stretching and frozen water bottle to stretch his foot as well.  Discussed possible follow-up with podiatry.  Can use over-the-counter ibuprofen per 's instructions to help alleviate symptoms if needed.    Differential diagnosis, natural history, supportive care, and indications for immediate follow-up discussed.   Patient given instructions and understanding of medications and  treatment.    If not improving in 3-5 days, F/U with PCP or return to UC if symptoms worsen.    Patient agreeable to plan.        Please note that this dictation was created using voice recognition software. I have made every reasonable attempt to correct obvious errors, but I expect that there are errors of grammar and possibly content that I did not discover before finalizing the note.    Sebastien Mendiola PA-C

## 2024-10-01 NOTE — PATIENT INSTRUCTIONS
Plantar Fasciitis    Plantar fasciitis is a painful foot condition that affects the heel. It occurs when the band of tissue that connects the toes to the heel bone (plantar fascia) becomes irritated. This can happen as the result of exercising too much or doing other repetitive activities (overuse injury).  Plantar fasciitis can cause mild irritation to severe pain that makes it difficult to walk or move. The pain is usually worse in the morning after sleeping, or after sitting or lying down for a period of time. Pain may also be worse after long periods of walking or standing.  What are the causes?  This condition may be caused by:  Standing for long periods of time.  Wearing shoes that do not have good arch support.  Doing activities that put stress on joints (high-impact activities). This includes ballet and exercise that makes your heart beat faster (aerobic exercise), such as running.  Being overweight.  An abnormal way of walking (gait).  Tight muscles in the back of your lower leg (calf).  High arches in your feet or flat feet.  Starting a new athletic activity.  What are the signs or symptoms?  The main symptom of this condition is heel pain. Pain may get worse after the following:  Taking the first steps after a time of rest, especially in the morning after awakening, or after you have been sitting or lying down for a while.  Long periods of standing still.  Pain may decrease after 30-45 minutes of activity, such as gentle walking.  How is this diagnosed?  This condition may be diagnosed based on your medical history, a physical exam, and your symptoms. Your health care provider will check for:  A tender area on the bottom of your foot.  A high arch in your foot or flat feet.  Pain when you move your foot.  Difficulty moving your foot.  You may have imaging tests to confirm the diagnosis, such as:  X-rays.  Ultrasound.  MRI.  How is this treated?  Treatment for plantar fasciitis depends on how severe your  condition is. Treatment may include:  Rest, ice, pressure (compression), and raising (elevating) the affected foot. This is called RICE therapy. Your health care provider may recommend RICE therapy along with over-the-counter pain medicines to manage your pain.  Exercises to stretch your calves and your plantar fascia.  A splint that holds your foot in a stretched, upward position while you sleep (night splint).  Physical therapy to relieve symptoms and prevent problems in the future.  Injections of steroid medicine (cortisone) to relieve pain and inflammation.  Stimulating your plantar fascia with electrical impulses (extracorporeal shock wave therapy). This is usually the last treatment option before surgery.  Surgery, if other treatments have not worked after 12 months.  Follow these instructions at home:  Managing pain, stiffness, and swelling    If directed, put ice on the painful area. To do this:  Put ice in a plastic bag, or use a frozen bottle of water.  Place a towel between your skin and the bag or bottle.  Roll the bottom of your foot over the bag or bottle.  Do this for 20 minutes, 2-3 times a day.  Wear athletic shoes that have air-sole or gel-sole cushions, or try soft shoe inserts that are designed for plantar fasciitis.  Elevate your foot above the level of your heart while you are sitting or lying down.  Activity  Avoid activities that cause pain. Ask your health care provider what activities are safe for you.  Do physical therapy exercises and stretches as told by your health care provider.  Try activities and forms of exercise that are easier on your joints (low impact). Examples include swimming, water aerobics, and biking.  General instructions  Take over-the-counter and prescription medicines only as told by your health care provider.  Wear a night splint while sleeping, if told by your health care provider. Loosen the splint if your toes tingle, become numb, or turn cold and blue.  Maintain a  healthy weight, or work with your health care provider to lose weight as needed.  Keep all follow-up visits. This is important.  Contact a health care provider if you have:  Symptoms that do not go away with home treatment.  Pain that gets worse.  Pain that affects your ability to move or do daily activities.  Summary  Plantar fasciitis is a painful foot condition that affects the heel. It occurs when the band of tissue that connects the toes to the heel bone (plantar fascia) becomes irritated.  Heel pain is the main symptom of this condition. It may get worse after exercising too much or standing still for a long time.  Treatment varies, but it usually starts with rest, ice, pressure (compression), and raising (elevating) the affected foot. This is called RICE therapy. Over-the-counter medicines can also be used to manage pain.  This information is not intended to replace advice given to you by your health care provider. Make sure you discuss any questions you have with your health care provider.  Document Revised: 04/05/2021 Document Reviewed: 04/05/2021  Elsevier Patient Education © 2023 [employer]

## 2024-12-14 DIAGNOSIS — J45.20 MILD INTERMITTENT ASTHMA WITHOUT COMPLICATION: ICD-10-CM

## 2024-12-14 DIAGNOSIS — R06.02 SHORTNESS OF BREATH: ICD-10-CM

## 2024-12-14 DIAGNOSIS — R06.2 WHEEZING: ICD-10-CM

## 2024-12-16 ENCOUNTER — HOSPITAL ENCOUNTER (OUTPATIENT)
Dept: RADIOLOGY | Facility: MEDICAL CENTER | Age: 44
End: 2024-12-16
Attending: OPHTHALMOLOGY
Payer: COMMERCIAL

## 2024-12-16 ENCOUNTER — HOSPITAL ENCOUNTER (OUTPATIENT)
Dept: LAB | Facility: MEDICAL CENTER | Age: 44
End: 2024-12-16
Attending: OPHTHALMOLOGY
Payer: COMMERCIAL

## 2024-12-16 DIAGNOSIS — H35.022 EXUDATIVE RETINOPATHY OF LEFT EYE: ICD-10-CM

## 2024-12-16 LAB
ALBUMIN SERPL BCP-MCNC: 4.5 G/DL (ref 3.2–4.9)
ALBUMIN/GLOB SERPL: 1.7 G/DL
ALP SERPL-CCNC: 63 U/L (ref 30–99)
ALT SERPL-CCNC: 25 U/L (ref 2–50)
ANION GAP SERPL CALC-SCNC: 9 MMOL/L (ref 7–16)
AST SERPL-CCNC: 23 U/L (ref 12–45)
BASOPHILS # BLD AUTO: 1.2 % (ref 0–1.8)
BASOPHILS # BLD: 0.08 K/UL (ref 0–0.12)
BILIRUB SERPL-MCNC: 0.3 MG/DL (ref 0.1–1.5)
BUN SERPL-MCNC: 10 MG/DL (ref 8–22)
CALCIUM ALBUM COR SERPL-MCNC: 8.7 MG/DL (ref 8.5–10.5)
CALCIUM SERPL-MCNC: 9.1 MG/DL (ref 8.5–10.5)
CHLORIDE SERPL-SCNC: 104 MMOL/L (ref 96–112)
CO2 SERPL-SCNC: 25 MMOL/L (ref 20–33)
CREAT SERPL-MCNC: 0.84 MG/DL (ref 0.5–1.4)
EOSINOPHIL # BLD AUTO: 0.21 K/UL (ref 0–0.51)
EOSINOPHIL NFR BLD: 3.3 % (ref 0–6.9)
ERYTHROCYTE [DISTWIDTH] IN BLOOD BY AUTOMATED COUNT: 41.9 FL (ref 35.9–50)
GFR SERPLBLD CREATININE-BSD FMLA CKD-EPI: 111 ML/MIN/1.73 M 2
GLOBULIN SER CALC-MCNC: 2.6 G/DL (ref 1.9–3.5)
GLUCOSE SERPL-MCNC: 113 MG/DL (ref 65–99)
HCT VFR BLD AUTO: 46.2 % (ref 42–52)
HGB BLD-MCNC: 15.9 G/DL (ref 14–18)
IMM GRANULOCYTES # BLD AUTO: 0.01 K/UL (ref 0–0.11)
IMM GRANULOCYTES NFR BLD AUTO: 0.2 % (ref 0–0.9)
LYMPHOCYTES # BLD AUTO: 1.87 K/UL (ref 1–4.8)
LYMPHOCYTES NFR BLD: 29.1 % (ref 22–41)
MCH RBC QN AUTO: 30.3 PG (ref 27–33)
MCHC RBC AUTO-ENTMCNC: 34.4 G/DL (ref 32.3–36.5)
MCV RBC AUTO: 88 FL (ref 81.4–97.8)
MONOCYTES # BLD AUTO: 0.65 K/UL (ref 0–0.85)
MONOCYTES NFR BLD AUTO: 10.1 % (ref 0–13.4)
NEUTROPHILS # BLD AUTO: 3.6 K/UL (ref 1.82–7.42)
NEUTROPHILS NFR BLD: 56.1 % (ref 44–72)
NRBC # BLD AUTO: 0 K/UL
NRBC BLD-RTO: 0 /100 WBC (ref 0–0.2)
PLATELET # BLD AUTO: 302 K/UL (ref 164–446)
PMV BLD AUTO: 10.2 FL (ref 9–12.9)
POTASSIUM SERPL-SCNC: 4.5 MMOL/L (ref 3.6–5.5)
PROT SERPL-MCNC: 7.1 G/DL (ref 6–8.2)
RBC # BLD AUTO: 5.25 M/UL (ref 4.7–6.1)
SODIUM SERPL-SCNC: 138 MMOL/L (ref 135–145)
T PALLIDUM AB SER QL IA: NORMAL
WBC # BLD AUTO: 6.4 K/UL (ref 4.8–10.8)

## 2024-12-16 PROCEDURE — 80053 COMPREHEN METABOLIC PANEL: CPT

## 2024-12-16 PROCEDURE — 71046 X-RAY EXAM CHEST 2 VIEWS: CPT

## 2024-12-16 PROCEDURE — 85025 COMPLETE CBC W/AUTO DIFF WBC: CPT

## 2024-12-16 PROCEDURE — 86480 TB TEST CELL IMMUN MEASURE: CPT

## 2024-12-16 PROCEDURE — 85549 MURAMIDASE: CPT

## 2024-12-16 PROCEDURE — 86777 TOXOPLASMA ANTIBODY: CPT

## 2024-12-16 PROCEDURE — 86780 TREPONEMA PALLIDUM: CPT

## 2024-12-16 PROCEDURE — 36415 COLL VENOUS BLD VENIPUNCTURE: CPT

## 2024-12-16 PROCEDURE — 82164 ANGIOTENSIN I ENZYME TEST: CPT

## 2024-12-16 RX ORDER — ALBUTEROL SULFATE 90 UG/1
2 INHALANT RESPIRATORY (INHALATION) EVERY 6 HOURS PRN
Qty: 8.5 G | Refills: 4 | Status: SHIPPED | OUTPATIENT
Start: 2024-12-16

## 2024-12-17 LAB — ACE SERPL-CCNC: 83 U/L (ref 16–85)

## 2024-12-18 ENCOUNTER — OFFICE VISIT (OUTPATIENT)
Dept: URGENT CARE | Facility: CLINIC | Age: 44
End: 2024-12-18
Payer: COMMERCIAL

## 2024-12-18 ENCOUNTER — APPOINTMENT (OUTPATIENT)
Dept: RADIOLOGY | Facility: IMAGING CENTER | Age: 44
End: 2024-12-18
Attending: REGISTERED NURSE
Payer: COMMERCIAL

## 2024-12-18 VITALS
HEIGHT: 73 IN | BODY MASS INDEX: 26.83 KG/M2 | HEART RATE: 68 BPM | OXYGEN SATURATION: 98 % | SYSTOLIC BLOOD PRESSURE: 108 MMHG | DIASTOLIC BLOOD PRESSURE: 70 MMHG | RESPIRATION RATE: 13 BRPM | WEIGHT: 202.4 LBS | TEMPERATURE: 97.7 F

## 2024-12-18 DIAGNOSIS — R07.81 RIB PAIN: ICD-10-CM

## 2024-12-18 LAB
GAMMA INTERFERON BACKGROUND BLD IA-ACNC: 0.05 IU/ML
M TB IFN-G BLD-IMP: NEGATIVE
M TB IFN-G CD4+ BCKGRND COR BLD-ACNC: 0 IU/ML
MITOGEN IGNF BCKGRD COR BLD-ACNC: 7.95 IU/ML
QFT TB2 - NIL TBQ2: 0 IU/ML
T GONDII IGG SER-ACNC: <3 IU/ML

## 2024-12-18 PROCEDURE — 71101 X-RAY EXAM UNILAT RIBS/CHEST: CPT | Mod: TC,RT | Performed by: RADIOLOGY

## 2024-12-18 PROCEDURE — 3078F DIAST BP <80 MM HG: CPT | Performed by: REGISTERED NURSE

## 2024-12-18 PROCEDURE — 3074F SYST BP LT 130 MM HG: CPT | Performed by: REGISTERED NURSE

## 2024-12-18 PROCEDURE — 99213 OFFICE O/P EST LOW 20 MIN: CPT | Performed by: REGISTERED NURSE

## 2024-12-18 RX ORDER — ALPRAZOLAM 0.5 MG
0.5 TABLET ORAL NIGHTLY PRN
COMMUNITY
End: 2024-12-24 | Stop reason: SDUPTHER

## 2024-12-18 ASSESSMENT — FIBROSIS 4 INDEX: FIB4 SCORE: 0.65

## 2024-12-18 NOTE — PROGRESS NOTES
Subjective:   Diego Abarca is a 44 y.o. male who presents for Fall (Pt states slipped on ice and fell into truck door, feels rib pain on the right side happened on Thursday )      HPI  6 days ago he slipped on ice and landed on his right lateral ribs against his car door.  Point tenderness.  No bruising or swelling.  No shortness of breath.  No hemoptysis.  No prior injuries here.  Using OTC meds which provide minimal relief.    ROS per HPI    Allergies   Allergen Reactions    Azithromycin Vomiting and Nausea     Had to go to ER.       Patient Active Problem List    Diagnosis Date Noted    Asthma     Arthralgia of right knee 04/16/2024    Tear of medial meniscus of knee 04/16/2024    Benzodiazepine dependence (HCC) 10/17/2023    Lower extremity edema 10/17/2023    Nephrolithiasis 07/28/2022    Rash 07/28/2022    Elevated liver enzymes 04/27/2022    Dermatitis 03/15/2022    Headache 02/03/2022    Anxiety 02/03/2022    Right ear pain 02/01/2022    Gastroesophageal reflux disease with esophagitis 11/10/2021    Costochondral chest pain 11/10/2021    Skin lesion 04/19/2021    Skin tag 10/15/2020    Pain of finger of left hand 09/08/2020    Thoracic back pain 06/12/2020    Dyshidrotic eczema 06/12/2020    Gout 05/18/2020    Cyst of skin 05/18/2020    Mild intermittent asthma without complication 05/18/2020    Right elbow pain 05/18/2020    Abdominal pain 01/04/2017       Current Outpatient Medications Ordered in Epic   Medication Sig Dispense Refill    ALPRAZolam (XANAX) 0.5 MG Tab Take 0.5 mg by mouth at bedtime as needed for Sleep.      albuterol 108 (90 Base) MCG/ACT Aero Soln inhalation aerosol Inhale 2 Puffs every 6 hours as needed for Shortness of Breath. 8.5 g 4    fluticasone furoate-vilanterol (BREO ELLIPTA) 100-25 MCG/ACT AEROSOL POWDER, BREATH ACTIVATED Inhale 1 Puff every day. 60 Each 5     No current Epic-ordered facility-administered medications on file.       Past Surgical History:   Procedure  "Laterality Date    MENISCECTOMY, KNEE, ARTHROSCOPIC Right 4/3/2024    Procedure: RIGHT KNEE ARTHROSCOPY PARTIAL MEDIAL MENISCECTOMY;  Surgeon: Conner Molina M.D.;  Location: SURGERY [facility];  Service: Orthopedics    CYST EXCISION  01/20/2021    RT ankle cyst    KNEE ARTHROSCOPY Left 2002    meniscus repair    COLONOSCOPY      ENDOSCOPY         Social History     Tobacco Use    Smoking status: Never    Smokeless tobacco: Never   Vaping Use    Vaping status: Never Used   Substance Use Topics    Alcohol use: Yes     Alcohol/week: 1.2 oz     Types: 2 Standard drinks or equivalent per week     Comment: \"a few beers a day\"    Drug use: Yes     Types: Inhaled, Marijuana     Comment: Marijuana-daily       family history includes Cancer in his child; Diabetes in his maternal grandfather; Heart Disease in his maternal grandfather; Lung Disease in his maternal grandfather and paternal grandfather; No Known Problems in his mother; Stroke in an other family member.     Problem list, medications, and allergies reviewed by myself today in Epic.     Objective:   /70 (BP Location: Left arm, Patient Position: Sitting, BP Cuff Size: Adult)   Pulse 68   Temp 36.5 °C (97.7 °F) (Temporal)   Resp 13   Ht 1.854 m (6' 1\")   Wt 91.8 kg (202 lb 6.4 oz)   SpO2 98%   BMI 26.70 kg/m²     Physical Exam  Vitals and nursing note reviewed.   Constitutional:       Appearance: Normal appearance. He is not ill-appearing or toxic-appearing.   HENT:      Mouth/Throat:      Mouth: Mucous membranes are moist.   Eyes:      Pupils: Pupils are equal, round, and reactive to light.   Cardiovascular:      Rate and Rhythm: Normal rate and regular rhythm.   Pulmonary:      Effort: Pulmonary effort is normal. No respiratory distress.      Breath sounds: Normal breath sounds.   Musculoskeletal:         General: Normal range of motion.      Cervical back: Normal range of motion.   Skin:     General: Skin is warm and dry.      Capillary " Refill: Capillary refill takes less than 2 seconds.      Findings: No bruising or erythema.             Comments: TTP.  No crepitus.  No discoloration.   Neurological:      General: No focal deficit present.      Mental Status: He is alert and oriented to person, place, and time.   Psychiatric:         Mood and Affect: Mood normal.         RADIOLOGY RESULTS   [id number] (WITH 1-VIEW CXR) RIGHT    Result Date: 12/18/2024 12/18/2024 3:16 PM HISTORY/REASON FOR EXAM:  Pain Following Trauma. Ground-level fall TECHNIQUE/EXAM DESCRIPTION AND NUMBER OF VIEWS:  3 images of the right ribs and chest. COMPARISON: NONE FINDINGS: No displaced fractures or acute bony changes are noted. No airspace opacity or consolidation. There is no evidence of a hemo or pneumothorax. Normal cardiopericardial silhouette.     No displaced rib fracture.               Assessment/Plan:     I personally reviewed prior external notes and test results pertinent to today's visit as well as additional imaging and testing completed in clinic today.    I introduced myself as El Carroll a Nurse Practitioner.    1. Rib pain  [id number] (WITH 1-VIEW CXR) RIGHT        Very pleasant 44-year-old who fell into his car door causing injury to the right lateral rib region.  No prior injuries.  No shortness of breath or hemoptysis.  He does have point tenderness/reproducible.  No skin coloration changes.  No crepitus.  Good aeration throughout all lobes.  No evidence of hypoxia or tracheal deviation.  Completed chest x-ray with ribs that is negative for displaced rib fracture.  Discussed likely contusion.  Continue with supportive measures including Tylenol and ibuprofen.  Pulmonary toilet.  Supportive measures reviewed. Medication discussed included indication for use and the potential benefits and side effects. The Patient was encouraged to monitor symptoms closely, and we reviewed the signs and symptoms that require a higher level of care  through the emergency department. Patient verbalized understanding.    Please note that this dictation was created using voice recognition software. I have made every reasonable attempt to correct obvious errors, but I expect that there are errors of grammar and possibly content that I did not discover before finalizing the note.    This note was electronically signed by TIN Santamaria

## 2024-12-19 LAB — LYSOZYME SERPL-MCNC: 1.53 UG/ML

## 2024-12-24 ENCOUNTER — OFFICE VISIT (OUTPATIENT)
Dept: URGENT CARE | Facility: CLINIC | Age: 44
End: 2024-12-24
Payer: COMMERCIAL

## 2024-12-24 VITALS
OXYGEN SATURATION: 97 % | WEIGHT: 200 LBS | HEART RATE: 67 BPM | DIASTOLIC BLOOD PRESSURE: 60 MMHG | TEMPERATURE: 98 F | BODY MASS INDEX: 26.51 KG/M2 | SYSTOLIC BLOOD PRESSURE: 110 MMHG | HEIGHT: 73 IN | RESPIRATION RATE: 17 BRPM

## 2024-12-24 DIAGNOSIS — F41.9 ANXIETY: ICD-10-CM

## 2024-12-24 DIAGNOSIS — J06.9 VIRAL URI WITH COUGH: ICD-10-CM

## 2024-12-24 LAB
FLUAV RNA SPEC QL NAA+PROBE: NEGATIVE
FLUBV RNA SPEC QL NAA+PROBE: NEGATIVE
RSV RNA SPEC QL NAA+PROBE: NEGATIVE
SARS-COV-2 RNA RESP QL NAA+PROBE: NEGATIVE

## 2024-12-24 PROCEDURE — 99213 OFFICE O/P EST LOW 20 MIN: CPT | Performed by: FAMILY MEDICINE

## 2024-12-24 PROCEDURE — 0241U POCT CEPHEID COV-2, FLU A/B, RSV - PCR: CPT | Performed by: FAMILY MEDICINE

## 2024-12-24 PROCEDURE — 3074F SYST BP LT 130 MM HG: CPT | Performed by: FAMILY MEDICINE

## 2024-12-24 PROCEDURE — 3078F DIAST BP <80 MM HG: CPT | Performed by: FAMILY MEDICINE

## 2024-12-24 RX ORDER — ALPRAZOLAM 0.5 MG
0.5 TABLET ORAL NIGHTLY PRN
Qty: 10 TABLET | Refills: 0 | Status: SHIPPED | OUTPATIENT
Start: 2024-12-24 | End: 2025-01-03

## 2024-12-24 RX ORDER — DEXTROMETHORPHAN HYDROBROMIDE AND PROMETHAZINE HYDROCHLORIDE 15; 6.25 MG/5ML; MG/5ML
5 SYRUP ORAL EVERY EVENING
Qty: 118 ML | Refills: 0 | Status: SHIPPED | OUTPATIENT
Start: 2024-12-24

## 2024-12-24 ASSESSMENT — ENCOUNTER SYMPTOMS
COUGH: 1
MYALGIAS: 1
SORE THROAT: 1

## 2024-12-24 ASSESSMENT — FIBROSIS 4 INDEX: FIB4 SCORE: 0.65

## 2024-12-25 NOTE — PROGRESS NOTES
Subjective:     Diego Abarca is a 44 y.o. male who presents for Nasal Congestion (Body aches, tiredness, fatigue, x4 days)      HPI  Pt presents for evaluation of an acute problem  Pt with an illness the past 4 days   Started with a sore throat which is improving   Has had increased cough   Has productive cough   Feeling fatigued   Had myalgias   Overall is feeling somewhat better    Review of Systems   Constitutional:  Positive for malaise/fatigue.   HENT:  Positive for congestion and sore throat.    Respiratory:  Positive for cough.    Musculoskeletal:  Positive for myalgias.       PMH:  has a past medical history of Alpha 1-antitrypsin PiMS phenotype, Anxiety, Asthma, Chickenpox, Constipation, [place] measles, History of gout, Indigestion, Irritable bowel syndrome (IBS), Mumps, PONV (postoperative nausea and vomiting), and Right knee pain (02/27/2024).    He has no past medical history of Acute nasopharyngitis, Anesthesia, Anginal syndrome (HCC), Arrhythmia, Arthritis, Blood clotting disorder ([facility]), Breath shortness, Bronchitis, Cancer ([facility]), Carcinoma in situ of respiratory system, Cataract, Congestive heart failure ([facility]ly [facility]), Continuous ambulatory peritoneal dialysis status ([facility]ly [facility]), Coughing blood, Dental disorder, Diabetes ([facility]), Dialysis patient (Formerly [facility]), Disorder of thyroid, Emphysema of lung (HCC), Glaucoma, Gynecological disorder, Heart burn, Heart murmur, Heart valve disease, Hemorrhagic disorder ([facility]), Hepatitis A, Hepatitis B, Hepatitis C, Hiatus hernia syndrome, High cholesterol, Hypertension, Jaundice, Myocardial infarct ([facility]), Pacemaker, Pain, Pneumonia, Pregnant, Psychiatric problem, Renal disorder, Rheumatic fever, Seizure ([facility]), Sleep apnea, Snoring, Stroke ([facility]), Tuberculosis, Urinary bladder disorder, or Urinary incontinence.  MEDS:   Current Outpatient Medications:     ALPRAZolam (XANAX) 0.5 MG Tab, Take 1 Tablet by mouth at bedtime as needed for Sleep or Anxiety for up to 10 days., Disp: 10  "Tablet, Rfl: 0    promethazine-dextromethorphan (PROMETHAZINE-DM) 6.25-15 MG/5ML syrup, Take 5 mL by mouth every evening., Disp: 118 mL, Rfl: 0    albuterol 108 (90 Base) MCG/ACT Aero Soln inhalation aerosol, Inhale 2 Puffs every 6 hours as needed for Shortness of Breath., Disp: 8.5 g, Rfl: 4  ALLERGIES:   Allergies   Allergen Reactions    Azithromycin Vomiting and Nausea     Had to go to ER.     SURGHX:   Past Surgical History:   Procedure Laterality Date    MENISCECTOMY, KNEE, ARTHROSCOPIC Right 4/3/2024    Procedure: RIGHT KNEE ARTHROSCOPY PARTIAL MEDIAL MENISCECTOMY;  Surgeon: Conner Molina M.D.;  Location: SURGERY [facility];  Service: Orthopedics    CYST EXCISION  01/20/2021    RT ankle cyst    KNEE ARTHROSCOPY Left 2002    meniscus repair    COLONOSCOPY      ENDOSCOPY       SOCHX:  reports that he has never smoked. He has never used smokeless tobacco. He reports current alcohol use of about 1.2 oz of alcohol per week. He reports current drug use. Drugs: Inhaled and Marijuana.     Objective:   /60 (BP Location: Left arm, Patient Position: Sitting, BP Cuff Size: Adult long)   Pulse 67   Temp 36.7 °C (98 °F) (Temporal)   Resp 17   Ht 1.854 m (6' 1\")   Wt 90.7 kg (200 lb)   SpO2 97%   BMI 26.39 kg/m²     Physical Exam  Constitutional:       General: He is not in acute distress.     Appearance: He is well-developed. He is not diaphoretic.   HENT:      Head: Normocephalic and atraumatic.      Right Ear: Tympanic membrane, ear canal and external ear normal.      Left Ear: Tympanic membrane, ear canal and external ear normal.      Nose: Congestion present.      Mouth/Throat:      Mouth: Mucous membranes are moist.      Pharynx: Oropharynx is clear. No oropharyngeal exudate or posterior oropharyngeal erythema.   Neck:      Trachea: No tracheal deviation.   Cardiovascular:      Rate and Rhythm: Normal rate and regular rhythm.      Heart sounds: Normal heart sounds.   Pulmonary:      Effort: " Pulmonary effort is normal. No respiratory distress.      Breath sounds: Normal breath sounds. No wheezing or rales.   Musculoskeletal:         General: Normal range of motion.      Cervical back: Normal range of motion and neck supple. No tenderness.   Lymphadenopathy:      Cervical: No cervical adenopathy.   Skin:     General: Skin is warm and dry.      Findings: No rash.   Neurological:      Mental Status: He is alert.   Psychiatric:         Behavior: Behavior normal.         Thought Content: Thought content normal.         Judgment: Judgment normal.         Assessment/Plan:   Assessment    1. Viral URI with cough  - POCT CEPHEID COV-2, FLU A/B, RSV - PCR  - promethazine-dextromethorphan (PROMETHAZINE-DM) 6.25-15 MG/5ML syrup; Take 5 mL by mouth every evening.  Dispense: 118 mL; Refill: 0    Patient with viral URI.  Point-of-care COVID/influenza/RSV all negative.  Recommended supportive care measures and given stronger cough medication for nighttime use.  Follow-up in the urgent care as needed.

## 2025-01-17 ENCOUNTER — APPOINTMENT (OUTPATIENT)
Dept: URGENT CARE | Facility: PHYSICIAN GROUP | Age: 45
End: 2025-01-17
Payer: COMMERCIAL

## 2025-02-04 ENCOUNTER — APPOINTMENT (OUTPATIENT)
Dept: MEDICAL GROUP | Facility: MEDICAL CENTER | Age: 45
End: 2025-02-04
Payer: COMMERCIAL

## 2025-02-04 VITALS
DIASTOLIC BLOOD PRESSURE: 62 MMHG | SYSTOLIC BLOOD PRESSURE: 122 MMHG | HEIGHT: 73 IN | BODY MASS INDEX: 25.83 KG/M2 | HEART RATE: 74 BPM | OXYGEN SATURATION: 98 % | WEIGHT: 194.87 LBS | TEMPERATURE: 97.7 F

## 2025-02-04 DIAGNOSIS — M79.641 RIGHT HAND PAIN: ICD-10-CM

## 2025-02-04 DIAGNOSIS — M65.331 TRIGGER MIDDLE FINGER OF RIGHT HAND: ICD-10-CM

## 2025-02-04 DIAGNOSIS — H53.8 BLURRY VISION: ICD-10-CM

## 2025-02-04 DIAGNOSIS — B07.8 OTHER VIRAL WARTS: ICD-10-CM

## 2025-02-04 DIAGNOSIS — F41.9 ANXIETY: ICD-10-CM

## 2025-02-04 DIAGNOSIS — H57.11 EYE PAIN, RIGHT: ICD-10-CM

## 2025-02-04 PROCEDURE — 99214 OFFICE O/P EST MOD 30 MIN: CPT | Mod: 25

## 2025-02-04 PROCEDURE — 3074F SYST BP LT 130 MM HG: CPT

## 2025-02-04 PROCEDURE — 3078F DIAST BP <80 MM HG: CPT

## 2025-02-04 PROCEDURE — 17000 DESTRUCT PREMALG LESION: CPT

## 2025-02-04 PROCEDURE — 17003 DESTRUCT PREMALG LES 2-14: CPT

## 2025-02-04 RX ORDER — ACETAMINOPHEN/DIPHENHYDRAMINE 500MG-25MG
TABLET ORAL
COMMUNITY

## 2025-02-04 RX ORDER — OMEPRAZOLE 40 MG/1
CAPSULE, DELAYED RELEASE ORAL
COMMUNITY

## 2025-02-04 RX ORDER — BENZONATATE 200 MG/1
CAPSULE ORAL
COMMUNITY
Start: 2025-01-19 | End: 2025-02-04

## 2025-02-04 RX ORDER — HYOSCYAMINE SULFATE 0.12 MG/1
TABLET SUBLINGUAL
COMMUNITY
Start: 2025-01-09

## 2025-02-04 RX ORDER — ALPRAZOLAM 0.5 MG
0.5 TABLET ORAL NIGHTLY PRN
Qty: 30 TABLET | Refills: 0 | Status: SHIPPED | OUTPATIENT
Start: 2025-02-04 | End: 2025-03-06

## 2025-02-04 ASSESSMENT — ENCOUNTER SYMPTOMS
PALPITATIONS: 0
COUGH: 0
FEVER: 0
SHORTNESS OF BREATH: 0
CHILLS: 0
ORTHOPNEA: 0

## 2025-02-04 ASSESSMENT — PATIENT HEALTH QUESTIONNAIRE - PHQ9: CLINICAL INTERPRETATION OF PHQ2 SCORE: 0

## 2025-02-04 ASSESSMENT — FIBROSIS 4 INDEX: FIB4 SCORE: 0.65

## 2025-02-04 NOTE — PROGRESS NOTES
Subjective:     Verbal consent was acquired by the patient to use [employer] ambient listening note generation during this visit Yes    CC: Follow-Up (Pt states he has been having right hand pain and can't straighten out his right middle finger. Pt states he has bee having pain behind his right eye.)      HPI:   Diego is a 44 y.o. male who presents today for:    History of Present Illness  The patient presents for evaluation of bilateral hand pain, eye pain, warts, and medication management.    He has been experiencing persistent discomfort in his arthritic hand for several months, with the onset of severe pain in the other hand last night. He describes the sensation as a combination of locking and catching, indicative of trigger finger. Additionally, he reports the presence of an unusual growth in the affected area.    He has been suffering from intermittent eye pain for the past 4 to 6 months, predominantly affecting the right eye. The pain is described as throbbing and is not associated with any visual disturbances. The duration of each episode varies, lasting between 10 to 20 minutes, and the frequency of these episodes has increased recently. He has consulted an ophthalmologist at [facility] who suggested that the pain may be originating from behind the eye rather than the eye itself. His left eye vision has deteriorated, while the right eye remains unaffected. The ophthalmologist has recommended 3 injections into the left eye to address the issue of dead tissue and veins causing inflammation and subsequent vision impairment. If the injections prove ineffective, LASIK surgery may be considered. He expresses concern about potential underlying causes for his symptoms. He occasionally takes Tylenol or ibuprofen for relief, but it is unclear whether these medications have been effective.    He has developed warts on his fingers, which have begun to cluster together. He has previously attempted to treat  "one of the warts by freezing it off.    He is seeking a refill of his Xanax prescription. He reports that the past few months have been challenging, with frequent illnesses in December 2024 and January 2025. He prefers to limit his use of Xanax due to the associated mood swings.    SOCIAL HISTORY  The patient reports drinking beer occasionally.    MEDICATIONS  Current: Xanax         Allergies: Azithromycin     Medications:   Current Outpatient Medications:     ALPRAZolam (XANAX) 0.5 MG Tab, Take 1 Tablet by mouth at bedtime as needed for Anxiety for up to 30 days., Disp: 30 Tablet, Rfl: 0    diphenhydrAMINE-APAP, sleep, (TYLENOL PM EXTRA STRENGTH)  MG Tab, 0, Disp: , Rfl:     hyoscyamine (LEVSIN) 0.125 MG SL Tab, DISSOLVE 1-2 TABLETS UNDER THE TONGUE EVERY 6 HOURS AS NEEDED FOR PAIN, Disp: , Rfl:     omeprazole (PRILOSEC) 40 MG delayed-release capsule, 0, Disp: , Rfl:     albuterol 108 (90 Base) MCG/ACT Aero Soln inhalation aerosol, Inhale 2 Puffs every 6 hours as needed for Shortness of Breath., Disp: 8.5 g, Rfl: 4      ROS:  Review of Systems   Constitutional:  Negative for chills and fever.   Respiratory:  Negative for cough and shortness of breath.    Cardiovascular:  Negative for chest pain, palpitations, orthopnea and leg swelling.       Objective:     Exam:  /62   Pulse 74   Temp 36.5 °C (97.7 °F) (Temporal)   Ht 1.854 m (6' 1\")   Wt 88.4 kg (194 lb 13.9 oz)   SpO2 98%   BMI 25.71 kg/m²  Body mass index is 25.71 kg/m².    Physical Exam  Constitutional:       Appearance: He is normal weight.   Eyes:      Pupils: Pupils are equal, round, and reactive to light.   Cardiovascular:      Rate and Rhythm: Normal rate and regular rhythm.      Pulses: Normal pulses.      Heart sounds: Normal heart sounds.   Pulmonary:      Effort: Pulmonary effort is normal.      Breath sounds: Normal breath sounds.   Neurological:      Mental Status: He is alert and oriented to person, place, and time.   Psychiatric: "         Mood and Affect: Mood normal.         Behavior: Behavior normal.           Assessment & Plan:     Diego farr 44 y.o. male with the following -     Assessment & Plan     1. Anxiety  Chronic, stable  Xanax refilled today.  PDMP reviewed.  No sign of misuse.  Recommend using sparingly and avoiding alcohol while using medication.  - ALPRAZolam (XANAX) 0.5 MG Tab; Take 1 Tablet by mouth at bedtime as needed for Anxiety for up to 30 days.  Dispense: 30 Tablet; Refill: 0    2. Other viral warts  Chronic, stable  Cryotherapy was performed on the warts during this visit. The patient was informed that the warts might not resolve after the initial treatment and that additional sessions of cryotherapy may be required. He was also educated about the viral nature of the warts and the potential for them to spread to others.    3. Trigger middle finger of right hand  4. Right hand pain  Chronic, unstable  The symptoms suggest a potential diagnosis of arthritis or trigger finger. A referral to an orthopedic specialist has been initiated for further evaluation and management.   - Referral to Orthopedics    5. Blurry vision  6. Eye pain, right  Undiagnosed problem with uncertain prognosis  The etiology of the pain remains uncertain, with the possibility of it being referred pain from another source. A CT scan of the eyes has been ordered to rule out any underlying pathology. Records will be requested from [facility] to understand the previous evaluations and treatment plan.  - [id number] WITH; Future    Other orders  - amoxicillin-clavulanate (AUGMENTIN) 875-125 MG Tab; Take 1 Tablet by mouth 2 times a day.  - diphenhydrAMINE-APAP, sleep, (TYLENOL PM EXTRA STRENGTH)  MG Tab; 0  - hyoscyamine (LEVSIN) 0.125 MG SL Tab; DISSOLVE 1-2 TABLETS UNDER THE TONGUE EVERY 6 HOURS AS NEEDED FOR PAIN  - omeprazole (PRILOSEC) 40 MG delayed-release capsule; 0        CRYOTHERAPY:  Discussed the benign nature of these lesions.      Verbal consent was obtained. Immediately prior to procedure, a time out was called to verify the correct patient, procedure, equipment, support staff and site/side marked as required. Pre-procedure pain reported as 0/10.     cryotherapy performed using liquid nitrogen, freeze-thaw-freeze technique x 3. Total of 7 warts frozen, 5 on right hand and 2 on left hand.  Patient tolerated the procedure well.  Post-procedure was 4/10. Aftercare as well as potential for blistering was discussed.  I explained that the procedure may need to be repeated if there is not complete resolution.       Anticipatory guidance included the following: Patient counseled about skin care, diet, supplements, smoking, drugs/alcohol use, safe sex and exercise.     Return if symptoms worsen or fail to improve.    Please note that this dictation was created using voice recognition software. I have made every reasonable attempt to correct obvious errors, but I expect that there are errors of grammar and possibly content that I did not discover before finalizing the note.

## 2025-02-04 NOTE — LETTER
[facility]  ELICEO PughREliecerN.  [address]  [place] [address]  Fax: [phone]   Authorization for Release/Disclosure of   Protected Health Information   Name: DIEGO ABARCA : 1980 SSN: [id number]   Address: [address] [place] [id number] Phone:    There are no phone numbers on file.   I authorize the entity listed below to release/disclose the PHI below to:   [facility]/ELICEO PughRDANELLE and TIN Pugh   Provider or Entity Name:   [facility]   Address   City, State, Zip   Phone:      Fax:     Reason for request: continuity of care   Information to be released:    [  ] LAST COLONOSCOPY,  including any PATH REPORT and follow-up  [  ] LAST FIT/COLOGUARD RESULT [  ] LAST DEXA  [  ] LAST MAMMOGRAM  [  ] LAST PAP  [  ] LAST LABS [  ] RETINA EXAM REPORT  [  ] IMMUNIZATION RECORDS  [ X ] Release all info      [  ] Check here and initial the line next to each item to release ALL health information INCLUDING  _____ Care and treatment for drug and / or alcohol abuse  _____ HIV testing, infection status, or AIDS  _____ Genetic Testing    DATES OF SERVICE OR TIME PERIOD TO BE DISCLOSED: _____________  I understand and acknowledge that:  * This Authorization may be revoked at any time by you in writing, except if your health information has already been used or disclosed.  * Your health information that will be used or disclosed as a result of you signing this authorization could be re-disclosed by the recipient. If this occurs, your re-disclosed health information may no longer be protected by State or Federal laws.  * You may refuse to sign this Authorization. Your refusal will not affect your ability to obtain treatment.  * This Authorization becomes effective upon signing and will  on (date) __________.      If no date is indicated, this Authorization will  one (1) year from the signature date.    Name: Diego Abarca  Signature:  Date:   2/4/2025     PLEASE FAX REQUESTED RECORDS BACK TO: [phone]

## 2025-02-13 ENCOUNTER — HOSPITAL ENCOUNTER (OUTPATIENT)
Dept: LAB | Facility: MEDICAL CENTER | Age: 45
End: 2025-02-13
Payer: COMMERCIAL

## 2025-02-13 DIAGNOSIS — M65.331 TRIGGER MIDDLE FINGER OF RIGHT HAND: ICD-10-CM

## 2025-02-13 DIAGNOSIS — Z11.4 ENCOUNTER FOR SCREENING FOR HIV: ICD-10-CM

## 2025-02-13 DIAGNOSIS — M79.641 RIGHT HAND PAIN: ICD-10-CM

## 2025-02-13 DIAGNOSIS — M79.646 PAIN OF FINGER, UNSPECIFIED LATERALITY: ICD-10-CM

## 2025-02-13 LAB
HIV 1+2 AB+HIV1 P24 AG SERPL QL IA: NORMAL
T PALLIDUM AB SER QL IA: NORMAL
T4 FREE SERPL-MCNC: 1.3 NG/DL (ref 0.93–1.7)
TSH SERPL-ACNC: 1.37 UIU/ML (ref 0.35–5.5)
URATE SERPL-MCNC: 8.1 MG/DL (ref 2.5–8.3)

## 2025-02-13 PROCEDURE — 84550 ASSAY OF BLOOD/URIC ACID: CPT

## 2025-02-13 PROCEDURE — 87389 HIV-1 AG W/HIV-1&-2 AB AG IA: CPT

## 2025-02-13 PROCEDURE — 84443 ASSAY THYROID STIM HORMONE: CPT

## 2025-02-13 PROCEDURE — 84439 ASSAY OF FREE THYROXINE: CPT

## 2025-02-13 PROCEDURE — 86364 TISS TRNSGLTMNASE EA IG CLAS: CPT

## 2025-02-13 PROCEDURE — 36415 COLL VENOUS BLD VENIPUNCTURE: CPT

## 2025-02-13 PROCEDURE — 86780 TREPONEMA PALLIDUM: CPT

## 2025-02-14 LAB — TTG IGA SER IA-ACNC: 1.05 FLU (ref 0–4.99)

## 2025-02-14 NOTE — Clinical Note
Member Name: Diego Harrisonn   Member Number: [id number]   Reference Number: [id number]   Approved Services: MRI/CAT Scan   Approved Service Dates: 02/04/2025 - 06/14/2025   Requesting Provider: Chanel Francis   Requested Provider: [facility]     Dear Diego Jeannie Harrisonn:     The following medical service(s) requested by Chanel Francis have been approved:    Number of Services: 1  Description: MRI/CAT Scan  CT scan of cranial cavity with contrast     The services should be provided by [facility] no later than 06/14/2025. Please contact the provider listed below with any questions.     Provider Information:  [facility]  [phone]    Your plan benefit may require a deductible, co-payment or coinsurance for these services. This authorization does not guarantee [facility] will pay the claim for services that you receive. Payment by [facility] for these services is subject to the terms of your Evidence of Coverage or Summary Plan Description, your eligibility at the time of service, and confirmation of benefit coverage.    For any questions or additional information, please contact Customer Service:    [facility]  Customer Service: [phone] or toll free [phone]  [employer] users dial: [phone]   Call Center Hours: Mon - Fri 7 AM to 8 PM PST   Office Hours: Mon - Fri 8 AM to 5 PM [facility]   E-mail: [email or web address]   Website: [email or web address]       This information is available for free in other languages. Please contact Customer Service at the phone number above for more information. [facility] complies with applicable Federal civil rights laws and does not discriminate on the basis of race, color, national origin, age, disability or sex.      Sincerely,     Healthcare Utilization Management Department     Cc: [facility]   Chanel Francis

## 2025-02-14 NOTE — Clinical Note
REFERRAL APPROVAL NOTICE         Sent on February 13, 2025                   Checo Abarca  [address]  [place] [address]                   Dear Mr. Abarca,    After a careful review of the medical information and benefit coverage, [facility] has processed your referral. See below for additional details.    If applicable, you must be actively enrolled with your insurance for coverage of the authorized service. If you have any questions regarding your coverage, please contact your insurance directly.    REFERRAL INFORMATION   Referral #:  [id number]  Referred-To Provider    Referred-By Provider:  Orthopedic Surgery    Arnoldo Rivera M.D.   [facility] ORTHOP[facility]ED[facility]      [address] [place] [address] [address]  [facility] [address]  [phone] [id number]  [address]  [facility] [address]  [phone]    Referral Start Date:  02/13/2025  Referral End Date:   02/13/2026             SCHEDULING  If you do not already have an appointment, please call [phone] to make an appointment.     MORE INFORMATION  If you do not already have a [facility] [employer] account, sign up at: [email or web address]  You can access your medical information, make appointments, see lab results, billing information, and more.  If you have questions regarding this referral, please contact  the [facility] Referrals department at:             [phone]. Monday - Friday 8:00AM - 5:00PM.     Sincerely,    [facility]

## 2025-02-16 ENCOUNTER — RESULTS FOLLOW-UP (OUTPATIENT)
Dept: MEDICAL GROUP | Facility: MEDICAL CENTER | Age: 45
End: 2025-02-16

## 2025-02-25 ENCOUNTER — HOSPITAL ENCOUNTER (OUTPATIENT)
Dept: RADIOLOGY | Facility: MEDICAL CENTER | Age: 45
End: 2025-02-25
Payer: COMMERCIAL

## 2025-02-25 DIAGNOSIS — R14.0 ABDOMINAL BLOATING: ICD-10-CM

## 2025-02-25 DIAGNOSIS — H57.11 EYE PAIN, RIGHT: ICD-10-CM

## 2025-02-25 DIAGNOSIS — H53.8 BLURRY VISION: ICD-10-CM

## 2025-02-25 DIAGNOSIS — R19.5 ABNORMAL FECES: ICD-10-CM

## 2025-02-25 DIAGNOSIS — R19.5 LOOSE BOWEL MOVEMENTS: ICD-10-CM

## 2025-02-25 DIAGNOSIS — R10.31 RLQ ABDOMINAL PAIN: ICD-10-CM

## 2025-02-25 DIAGNOSIS — K64.9 HEMORRHOIDS WITHOUT COMPLICATION: ICD-10-CM

## 2025-02-25 PROCEDURE — 74177 CT ABD & PELVIS W/CONTRAST: CPT

## 2025-02-25 PROCEDURE — 70481 CT ORBIT/EAR/FOSSA W/DYE: CPT

## 2025-02-25 PROCEDURE — 700117 HCHG RX CONTRAST REV CODE 255

## 2025-02-25 RX ADMIN — IOHEXOL 25 ML: 240 INJECTION, SOLUTION INTRATHECAL; INTRAVASCULAR; INTRAVENOUS; ORAL at 09:35

## 2025-02-25 RX ADMIN — IOHEXOL 100 ML: 350 INJECTION, SOLUTION INTRAVENOUS at 09:35

## 2025-02-28 ENCOUNTER — RESULTS FOLLOW-UP (OUTPATIENT)
Dept: MEDICAL GROUP | Facility: MEDICAL CENTER | Age: 45
End: 2025-02-28

## 2025-03-20 ENCOUNTER — HOSPITAL ENCOUNTER (OUTPATIENT)
Dept: RADIOLOGY | Facility: MEDICAL CENTER | Age: 45
End: 2025-03-20
Payer: COMMERCIAL

## 2025-03-20 ENCOUNTER — APPOINTMENT (OUTPATIENT)
Dept: RADIOLOGY | Facility: MEDICAL CENTER | Age: 45
End: 2025-03-20
Payer: COMMERCIAL

## 2025-03-20 DIAGNOSIS — R19.5 ABNORMAL FECES: ICD-10-CM

## 2025-03-20 DIAGNOSIS — K64.9 HEMORRHOIDS WITHOUT COMPLICATION: ICD-10-CM

## 2025-03-20 DIAGNOSIS — R14.0 GASTRIC TYMPANY: ICD-10-CM

## 2025-03-20 DIAGNOSIS — R10.31 ABDOMINAL PAIN, RIGHT LOWER QUADRANT: ICD-10-CM

## 2025-03-20 PROCEDURE — 76700 US EXAM ABDOM COMPLETE: CPT

## 2025-03-26 ENCOUNTER — OFFICE VISIT (OUTPATIENT)
Dept: MEDICAL GROUP | Facility: MEDICAL CENTER | Age: 45
End: 2025-03-26
Payer: COMMERCIAL

## 2025-03-26 VITALS
HEART RATE: 58 BPM | SYSTOLIC BLOOD PRESSURE: 112 MMHG | TEMPERATURE: 98 F | BODY MASS INDEX: 26 KG/M2 | WEIGHT: 196.21 LBS | HEIGHT: 73 IN | DIASTOLIC BLOOD PRESSURE: 62 MMHG | OXYGEN SATURATION: 96 %

## 2025-03-26 DIAGNOSIS — M25.552 LEFT HIP PAIN: ICD-10-CM

## 2025-03-26 DIAGNOSIS — F41.9 ANXIETY: ICD-10-CM

## 2025-03-26 DIAGNOSIS — F13.20 BENZODIAZEPINE DEPENDENCE (HCC): ICD-10-CM

## 2025-03-26 PROCEDURE — 99214 OFFICE O/P EST MOD 30 MIN: CPT

## 2025-03-26 PROCEDURE — 3074F SYST BP LT 130 MM HG: CPT

## 2025-03-26 PROCEDURE — 3078F DIAST BP <80 MM HG: CPT

## 2025-03-26 RX ORDER — HYDROXYZINE HYDROCHLORIDE 25 MG/1
25 TABLET, FILM COATED ORAL 3 TIMES DAILY PRN
Qty: 90 TABLET | Refills: 5 | Status: SHIPPED | OUTPATIENT
Start: 2025-03-26

## 2025-03-26 RX ORDER — ALPRAZOLAM 0.5 MG
0.5 TABLET ORAL NIGHTLY PRN
Qty: 30 TABLET | Refills: 0 | Status: SHIPPED | OUTPATIENT
Start: 2025-03-26 | End: 2025-04-25

## 2025-03-26 ASSESSMENT — ENCOUNTER SYMPTOMS
CHILLS: 0
COUGH: 0
SHORTNESS OF BREATH: 0
PALPITATIONS: 0
ORTHOPNEA: 0
FEVER: 0

## 2025-03-26 ASSESSMENT — FIBROSIS 4 INDEX: FIB4 SCORE: 0.65

## 2025-03-26 NOTE — PROGRESS NOTES
Subjective:     Verbal consent was acquired by the patient to use [employer] ambient listening note generation during this visit Yes    CC: Medication Refill and Hip Injury (Left hip, hit on shelf and 2 wks later starting hurting got xray at [facility] was good but got steroids and no help yet  )      HPI:   Diego is a 44 y.o. male who presents today for:    History of Present Illness  The patient presents for a follow-up on anxiety and hip pain.    He is seeking a refill of his Xanax prescription, which he reports as being effective. However, he experiences fluctuations in his mood, with some days being better than others. He has been managing well overall but occasionally experiences significant low moods. He expresses interest in exploring alternative treatment options. He has previously considered Prozac but is hesitant to add another daily medication to his regimen, which already includes Prilosec. He acknowledges the potential risks associated with Xanax use and is committed to using it more judiciously. He also reports difficulty sleeping, necessitating the use of Advil or other sleep aids nightly. He attributes a significant portion of his stress to work and has decided to reduce his workload this year in an effort to manage his stress levels. He also mentions the stress associated with parenting his 7-year-old child, and wanting to spend more time with them. He has found hydroxyzine to be beneficial in the past, and would like to try using that again so he can use his xanax more sparingly.    He recently sustained a hip injury on 3/24/25, resulting in a large bruise. The bruise has since resolved, but he now experiences hip pain, which was initially mild but escalated to severe pain within a 5-minute span, rendering him immobile. He was seen at [facility] and had xrays done. No fractures were seen. He was prescribed steroids for the pain, which initially provided relief, but the pain has since returned as he has been  "more active the last 2 days. He frequently wears a tool belt, which he believes may be exacerbating the pain. He is currently unable to climb ladders due to the pain. He is wondering what he needs to do for follow up and controlling his pain.             Allergies: Azithromycin     Medications:   Current Outpatient Medications:     ALPRAZolam (XANAX) 0.5 MG Tab, Take 1 Tablet by mouth at bedtime as needed for Anxiety for up to 30 days., Disp: 30 Tablet, Rfl: 0    hydrOXYzine HCl (ATARAX) 25 MG Tab, Take 1 Tablet by mouth 3 times a day as needed for Anxiety., Disp: 90 Tablet, Rfl: 5    methylPREDNISolone (MEDROL DOSEPAK) 4 MG Tablet Therapy Pack, Follow schedule on package instructions., Disp: 21 Tablet, Rfl: 0    diphenhydrAMINE-APAP, sleep, (TYLENOL PM EXTRA STRENGTH)  MG Tab, 0, Disp: , Rfl:     hyoscyamine (LEVSIN) 0.125 MG SL Tab, DISSOLVE 1-2 TABLETS UNDER THE TONGUE EVERY 6 HOURS AS NEEDED FOR PAIN, Disp: , Rfl:     omeprazole (PRILOSEC) 40 MG delayed-release capsule, 0, Disp: , Rfl:     albuterol 108 (90 Base) MCG/ACT Aero Soln inhalation aerosol, Inhale 2 Puffs every 6 hours as needed for Shortness of Breath., Disp: 8.5 g, Rfl: 4      ROS:  Review of Systems   Constitutional:  Negative for chills and fever.   Respiratory:  Negative for cough and shortness of breath.    Cardiovascular:  Negative for chest pain, palpitations, orthopnea and leg swelling.   Musculoskeletal:  Positive for joint pain.       Objective:     Exam:  /62   Pulse (!) 58   Temp 36.7 °C (98 °F)   Ht 1.854 m (6' 1\")   Wt 89 kg (196 lb 3.4 oz)   SpO2 96%   BMI 25.89 kg/m²  Body mass index is 25.89 kg/m².    Physical Exam  Constitutional:       Appearance: He is normal weight.   Eyes:      Pupils: Pupils are equal, round, and reactive to light.   Cardiovascular:      Rate and Rhythm: Normal rate and regular rhythm.      Pulses: Normal pulses.      Heart sounds: Normal heart sounds.   Pulmonary:      Effort: Pulmonary effort " is normal.      Breath sounds: Normal breath sounds.   Musculoskeletal:      Left hip: Tenderness present.   Neurological:      Mental Status: He is alert and oriented to person, place, and time.   Psychiatric:         Mood and Affect: Mood normal.         Behavior: Behavior normal.           Assessment & Plan:     Diego farr 44 y.o. male with the following -     Assessment & Plan  1. Anxiety.    2. Hip pain.      1. Anxiety  2. Benzodiazepine dependence (HCC)  Chronic, unstable  PDMP reviewed.  No sign of misuse.He reports that Xanax is somewhat effective but has good and bad days. He is advised to use Xanax sparingly, reserving it for particularly challenging nights. A prescription for hydroxyzine has been issued, with instructions to take it up to three times daily as needed, or solely before bedtime if not required during the day. Extra refills for hydroxyzine have been provided to reduce the frequency of visits. We discussed considering a daily antianxiety medication to help prevent or lessen the need for xanax. Patient not interested at this time.   - ALPRAZolam (XANAX) 0.5 MG Tab; Take 1 Tablet by mouth at bedtime as needed for Anxiety for up to 30 days.  Dispense: 30 Tablet; Refill: 0  - hydrOXYzine HCl (ATARAX) 25 MG Tab; Take 1 Tablet by mouth 3 times a day as needed for Anxiety.  Dispense: 90 Tablet; Refill: 5    3. Left hip pain  Acute, uncomplicated  He experienced a significant bruise on his left hip, which has since resolved, but he continues to experience pain. He is advised to apply ice and heat to the affected area and to take Tylenol or ibuprofen for pain management.  He may take up to 4 g of Tylenol daily and can alternate between Tylenol and ibuprofen every 2 to 3 hours if tolerated. He is encouraged to avoid activities that exacerbate the pain and to give the condition more time to heal. If there is no improvement, he should follow up with [facility] in 2 weeks for further evaluation.    Anticipatory  guidance included the following: Patient counseled about skin care, diet, supplements, smoking, drugs/alcohol use, safe sex and exercise.     Return if symptoms worsen or fail to improve.    Please note that this dictation was created using voice recognition software. I have made every reasonable attempt to correct obvious errors, but I expect that there are errors of grammar and possibly content that I did not discover before finalizing the note.

## 2025-04-01 ENCOUNTER — APPOINTMENT (OUTPATIENT)
Dept: RADIOLOGY | Facility: MEDICAL CENTER | Age: 45
End: 2025-04-01
Attending: STUDENT IN AN ORGANIZED HEALTH CARE EDUCATION/TRAINING PROGRAM
Payer: COMMERCIAL

## 2025-04-01 ENCOUNTER — HOSPITAL ENCOUNTER (EMERGENCY)
Facility: MEDICAL CENTER | Age: 45
End: 2025-04-01
Attending: STUDENT IN AN ORGANIZED HEALTH CARE EDUCATION/TRAINING PROGRAM
Payer: COMMERCIAL

## 2025-04-01 VITALS
SYSTOLIC BLOOD PRESSURE: 124 MMHG | OXYGEN SATURATION: 95 % | DIASTOLIC BLOOD PRESSURE: 78 MMHG | TEMPERATURE: 98.8 F | HEART RATE: 69 BPM | RESPIRATION RATE: 24 BRPM

## 2025-04-01 DIAGNOSIS — R01.1 SYSTOLIC MURMUR: ICD-10-CM

## 2025-04-01 DIAGNOSIS — R07.89 ATYPICAL CHEST PAIN: ICD-10-CM

## 2025-04-01 LAB
ALBUMIN SERPL BCP-MCNC: 4.1 G/DL (ref 3.2–4.9)
ALBUMIN/GLOB SERPL: 1.7 G/DL
ALP SERPL-CCNC: 74 U/L (ref 30–99)
ALT SERPL-CCNC: 24 U/L (ref 2–50)
ANION GAP SERPL CALC-SCNC: 12 MMOL/L (ref 7–16)
AST SERPL-CCNC: 25 U/L (ref 12–45)
BASOPHILS # BLD AUTO: 1.2 % (ref 0–1.8)
BASOPHILS # BLD: 0.1 K/UL (ref 0–0.12)
BILIRUB SERPL-MCNC: 0.2 MG/DL (ref 0.1–1.5)
BUN SERPL-MCNC: 14 MG/DL (ref 8–22)
CALCIUM ALBUM COR SERPL-MCNC: 8.7 MG/DL (ref 8.5–10.5)
CALCIUM SERPL-MCNC: 8.8 MG/DL (ref 8.5–10.5)
CHLORIDE SERPL-SCNC: 101 MMOL/L (ref 96–112)
CO2 SERPL-SCNC: 22 MMOL/L (ref 20–33)
CREAT SERPL-MCNC: 1.02 MG/DL (ref 0.5–1.4)
EKG IMPRESSION: NORMAL
EOSINOPHIL # BLD AUTO: 0.32 K/UL (ref 0–0.51)
EOSINOPHIL NFR BLD: 3.8 % (ref 0–6.9)
ERYTHROCYTE [DISTWIDTH] IN BLOOD BY AUTOMATED COUNT: 43.7 FL (ref 35.9–50)
GFR SERPLBLD CREATININE-BSD FMLA CKD-EPI: 93 ML/MIN/1.73 M 2
GLOBULIN SER CALC-MCNC: 2.4 G/DL (ref 1.9–3.5)
GLUCOSE SERPL-MCNC: 91 MG/DL (ref 65–99)
HCT VFR BLD AUTO: 43 % (ref 42–52)
HGB BLD-MCNC: 14.7 G/DL (ref 14–18)
IMM GRANULOCYTES # BLD AUTO: 0.02 K/UL (ref 0–0.11)
IMM GRANULOCYTES NFR BLD AUTO: 0.2 % (ref 0–0.9)
LYMPHOCYTES # BLD AUTO: 3.62 K/UL (ref 1–4.8)
LYMPHOCYTES NFR BLD: 43.4 % (ref 22–41)
MCH RBC QN AUTO: 30.6 PG (ref 27–33)
MCHC RBC AUTO-ENTMCNC: 34.2 G/DL (ref 32.3–36.5)
MCV RBC AUTO: 89.4 FL (ref 81.4–97.8)
MONOCYTES # BLD AUTO: 1.07 K/UL (ref 0–0.85)
MONOCYTES NFR BLD AUTO: 12.8 % (ref 0–13.4)
NEUTROPHILS # BLD AUTO: 3.21 K/UL (ref 1.82–7.42)
NEUTROPHILS NFR BLD: 38.6 % (ref 44–72)
NRBC # BLD AUTO: 0 K/UL
NRBC BLD-RTO: 0 /100 WBC (ref 0–0.2)
NT-PROBNP SERPL IA-MCNC: 40 PG/ML (ref 0–125)
PLATELET # BLD AUTO: 304 K/UL (ref 164–446)
PMV BLD AUTO: 8.9 FL (ref 9–12.9)
POTASSIUM SERPL-SCNC: 4.2 MMOL/L (ref 3.6–5.5)
PROT SERPL-MCNC: 6.5 G/DL (ref 6–8.2)
RBC # BLD AUTO: 4.81 M/UL (ref 4.7–6.1)
SODIUM SERPL-SCNC: 135 MMOL/L (ref 135–145)
TROPONIN T SERPL-MCNC: <6 NG/L (ref 6–19)
WBC # BLD AUTO: 8.3 K/UL (ref 4.8–10.8)

## 2025-04-01 PROCEDURE — 83880 ASSAY OF NATRIURETIC PEPTIDE: CPT

## 2025-04-01 PROCEDURE — 80053 COMPREHEN METABOLIC PANEL: CPT

## 2025-04-01 PROCEDURE — 84484 ASSAY OF TROPONIN QUANT: CPT

## 2025-04-01 PROCEDURE — 36415 COLL VENOUS BLD VENIPUNCTURE: CPT

## 2025-04-01 PROCEDURE — 93005 ELECTROCARDIOGRAM TRACING: CPT | Mod: TC

## 2025-04-01 PROCEDURE — 96374 THER/PROPH/DIAG INJ IV PUSH: CPT

## 2025-04-01 PROCEDURE — 71045 X-RAY EXAM CHEST 1 VIEW: CPT

## 2025-04-01 PROCEDURE — 99285 EMERGENCY DEPT VISIT HI MDM: CPT

## 2025-04-01 PROCEDURE — 700111 HCHG RX REV CODE 636 W/ 250 OVERRIDE (IP): Mod: JZ | Performed by: STUDENT IN AN ORGANIZED HEALTH CARE EDUCATION/TRAINING PROGRAM

## 2025-04-01 PROCEDURE — 93005 ELECTROCARDIOGRAM TRACING: CPT | Mod: TC | Performed by: STUDENT IN AN ORGANIZED HEALTH CARE EDUCATION/TRAINING PROGRAM

## 2025-04-01 PROCEDURE — 85025 COMPLETE CBC W/AUTO DIFF WBC: CPT

## 2025-04-01 RX ORDER — KETOROLAC TROMETHAMINE 15 MG/ML
15 INJECTION, SOLUTION INTRAMUSCULAR; INTRAVENOUS ONCE
Status: COMPLETED | OUTPATIENT
Start: 2025-04-01 | End: 2025-04-01

## 2025-04-01 RX ADMIN — KETOROLAC TROMETHAMINE 15 MG: 15 INJECTION, SOLUTION INTRAMUSCULAR; INTRAVENOUS at 23:16

## 2025-04-01 ASSESSMENT — HEART SCORE
TROPONIN: LESS THAN OR EQUAL TO NORMAL LIMIT
RISK FACTORS: NO KNOWN RISK FACTORS
HEART SCORE: 2
ECG: NON-SPECIFIC REPOLARIZATION DISTURBANCE
HISTORY: MODERATELY SUSPICIOUS
AGE: <45

## 2025-04-01 ASSESSMENT — PAIN DESCRIPTION - PAIN TYPE
TYPE: ACUTE PAIN
TYPE: ACUTE PAIN

## 2025-04-02 NOTE — ED TRIAGE NOTES
Chief Complaint   Patient presents with    Chest Pain     Been going on for 3 days but worst today. Pt is having left chest pain that does not radiate, pt describes as sharp 8/10. Pt has not taken any medication to help with the pain.     BP (!) 143/81   Pulse 66   Temp 37.1 °C (98.8 °F) (Temporal)   Resp 16   SpO2 95%

## 2025-04-02 NOTE — ED PROVIDER NOTES
"ED Provider Note    CHIEF COMPLAINT  Chief Complaint   Patient presents with    Chest Pain     Been going on for 3 days but worst today. Pt is having left chest pain that does not radiate, pt describes as sharp 8/10. Pt has not taken any medication to help with the pain.       EXTERNAL RECORDS REVIEWED  Outpatient Notes 3/26/2025 visit for anxiety    HPI/ROS  LIMITATION TO HISTORY   Select: : None  OUTSIDE HISTORIAN(S):  Significant other wife    Diego Abarca is a 44 y.o. male who presents for left-sided chest pain constant for the past 3 days.  Sometimes worsened with movement of the left arm.  Denies radiation denies associated shortness of breath.    PAST MEDICAL HISTORY   has a past medical history of Alpha 1-antitrypsin PiMS phenotype, Anxiety, Asthma, Chickenpox, Constipation, Urdu measles, History of gout, Indigestion, Irritable bowel syndrome (IBS), Mumps, PONV (postoperative nausea and vomiting), and Right knee pain (02/27/2024).    SURGICAL HISTORY   has a past surgical history that includes knee arthroscopy (Left, 2002); cyst excision (01/20/2021); colonoscopy; endoscopy; and meniscectomy, knee, arthroscopic (Right, 4/3/2024).    FAMILY HISTORY  Family History   Problem Relation Age of Onset    No Known Problems Mother     Lung Disease Maternal Grandfather         alpha-1 antitrypsin    Diabetes Maternal Grandfather     Heart Disease Maternal Grandfather     Cancer Child         leukemia type B    Stroke Other     Lung Disease Paternal Grandfather     Drug abuse Neg Hx     Alcohol abuse Neg Hx        SOCIAL HISTORY  Social History     Tobacco Use    Smoking status: Never    Smokeless tobacco: Never   Vaping Use    Vaping status: Never Used   Substance and Sexual Activity    Alcohol use: Yes     Alcohol/week: 1.2 oz     Types: 2 Standard drinks or equivalent per week     Comment: 3 beers a day    Drug use: Yes     Types: Inhaled, Marijuana     Comment: Marijuana-daily, stop \"coke\" one month ago    " Sexual activity: Yes     Partners: Female     Comment: : 2011       CURRENT MEDICATIONS  Home Medications       Reviewed by Estiven Montenegro R.N. (Registered Nurse) on 04/01/25 at 2141  Med List Status: Partial     Medication Last Dose Status   albuterol 108 (90 Base) MCG/ACT Aero Soln inhalation aerosol  Active   ALPRAZolam (XANAX) 0.5 MG Tab  Active   diphenhydrAMINE-APAP, sleep, (TYLENOL PM EXTRA STRENGTH)  MG Tab  Active   hydrOXYzine HCl (ATARAX) 25 MG Tab  Active   hyoscyamine (LEVSIN) 0.125 MG SL Tab  Active   methylPREDNISolone (MEDROL DOSEPAK) 4 MG Tablet Therapy Pack  Active   omeprazole (PRILOSEC) 40 MG delayed-release capsule  Active                    ALLERGIES  Allergies   Allergen Reactions    Azithromycin Vomiting and Nausea     Had to go to ER.       PHYSICAL EXAM  VITAL SIGNS: /78   Pulse 69   Temp 37.1 °C (98.8 °F) (Temporal)   Resp (!) 24   SpO2 95%    Physical Exam  Vitals and nursing note reviewed.   Constitutional:       Appearance: He is well-developed.   HENT:      Head: Normocephalic.   Cardiovascular:      Rate and Rhythm: Normal rate and regular rhythm.      Heart sounds: Murmur heard.      Decrescendo systolic murmur is present with a grade of 2/6.   Pulmonary:      Effort: Pulmonary effort is normal.      Breath sounds: Normal breath sounds.   Abdominal:      Palpations: Abdomen is soft.      Tenderness: There is no abdominal tenderness.   Musculoskeletal:         General: Normal range of motion.      Right lower leg: No edema.      Left lower leg: No edema.   Skin:     General: Skin is warm.   Neurological:      General: No focal deficit present.      Mental Status: He is alert and oriented to person, place, and time.         EKG/LABS  Labs Reviewed   CBC WITH DIFFERENTIAL - Abnormal; Notable for the following components:       Result Value    MPV 8.9 (*)     Neutrophils-Polys 38.60 (*)     Lymphocytes 43.40 (*)     Monos (Absolute) 1.07 (*)     All other  components within normal limits   COMP METABOLIC PANEL   PROBRAIN NATRIURETIC PEPTIDE, NT   TROPONIN   ESTIMATED GFR     I have independently interpreted this EKG    RADIOLOGY/PROCEDURES   I have independently interpreted the diagnostic imaging associated with this visit and am waiting the final reading from the radiologist.   My preliminary interpretation is as follows: Chest x-ray no acute process    Radiologist interpretation:  DX-CHEST-PORTABLE (1 VIEW)   Final Result         1.  No acute cardiopulmonary disease.          COURSE & MEDICAL DECISION MAKING    ASSESSMENT, COURSE AND PLAN  Care Narrative: 44-year-old male presenting with atypical chest pain constant for the past 3 days workup here with nonischemic EKG, normal troponin.  Toradol given with significant improvement in discomfort.  Suspect musculoskeletal etiology based off of the nature of the symptoms.  Chest x-ray obtained and negative for spontaneous pneumothorax or consolidation.  Heart score is 2 therefore patient is stable for discharge at this time with outpatient follow-up.  I have placed a cardiology referral as I feel he would benefit from further structural evaluation of his heart as his EKG does have some signs of LVH and he has a systolic murmur present on exam.  I do not feel this has anything to do with his presentation of pain but otherwise do feel that it warrants further outpatient evaluation.  I have discussed this with the patient and adequate to return precautions for worsening chest pain development of shortness of breath or syncope.    CHEST PAIN:   HEART Score for Major Cardiac Events  HEART Score     History: Moderately suspicious  ECG: Non-specific repolarization disturbance  Age: <45  Risk Factors: No known risk factors  Troponin: Less than or equal to normal limit    Heart Score: 2    Total Score   0-3 Points = Low Score, risk of MACE 0.9-1.7%.  4-6 Points = Moderate Score, risk of MACE 12-16.6%  7-10 Points = High Score,  risk of MACE 50-65%          ADDITIONAL PROBLEMS MANAGED  Past Medical History:   Diagnosis Date    Alpha 1-antitrypsin PiMS phenotype     Anxiety     2/27/24-no current meds    Asthma     inhalers prn    Chickenpox     Constipation     Yakut measles     History of gout     Indigestion     omeprazole daily    Irritable bowel syndrome (IBS)     Mumps     PONV (postoperative nausea and vomiting)     with 1st surgery only    Right knee pain 02/27/2024       DISPOSITION AND DISCUSSIONS  I have discussed management of the patient with the following physicians and CIERA's:      Discussion of management with other QHP or appropriate source(s):      Escalation of care considered, and ultimately not performed:    Barriers to care at this time, including but not limited to: .     Decision tools and prescription drugs considered including, but not limited to: PERC rule negative .    FINAL DIAGNOSIS  1. Atypical chest pain    2. Systolic murmur         Electronically signed by: Brody Munguia M.D., 4/1/2025 10:42 PM

## 2025-04-04 ENCOUNTER — APPOINTMENT (OUTPATIENT)
Dept: MEDICAL GROUP | Facility: MEDICAL CENTER | Age: 45
End: 2025-04-04
Payer: COMMERCIAL

## 2025-04-08 ENCOUNTER — TELEPHONE (OUTPATIENT)
Dept: HEALTH INFORMATION MANAGEMENT | Facility: OTHER | Age: 45
End: 2025-04-08

## 2025-04-08 NOTE — PROGRESS NOTES
"Chief Complaint   Patient presents with    Chest Pain     Hospital follow up          Subjective:   Diego Abarca is a 44 y.o. male who presents today for follow-up.     Patient went to the ED with chest pain on 4/1/2025. His EKG was negative for any ischemic changes and normal troponin. He was given toradol and significantly improved his pain. Chest x ray was negative for any concerning findings. He did have systolic murmur per EDP and EKG showed signs of LVH so referral to outpatient cardiology was ordered.     Today's visit:  Patient presents today for follow-up with his wife.  Patient reports that for over 5 years he would have intermittent chest pains however over the last 6 months and specifically 3 days ago when he went to the emergency department his chest pain had increased.  He was getting sharp intermittent chest pains with movement and at rest.  He also reports palpitations occur with chest pain, he does believe that sometimes the palpitations cause the chest pain.  He denies shortness of breath, orthopnea, PND, edema, lightheaded/dizziness or syncope.  Patient does report he had a viral respiratory infection that he took a while to get over and still feels like he coughs up phlegm.  Patient denies chest pain worsens with laying down or activity.  Patient does drink 2 beers multiple times a week, he does not smoke cigarettes but does smoke marijuana daily up to 4 times a day.  He does not report any other recreational drug use.  He does not have any prior cardiac history and no family history of premature coronary artery.    Cardiovascular Risk Factors:  1. Smoking status: Current smoker Smoke marijuana daily  2. Type II Diabetes Mellitus: No No results found for: \"HBA1C\" Denies  3. Hypertension: No  4. Dyslipidemia: Yes   Cholesterol,Tot   Date Value Ref Range Status   10/25/2023 205 (H) 100 - 199 mg/dL Final     LDL   Date Value Ref Range Status   10/25/2023 130 (H) <100 mg/dL Final     HDL " "  Date Value Ref Range Status   10/25/2023 62 >=40 mg/dL Final     Triglycerides   Date Value Ref Range Status   10/25/2023 67 0 - 149 mg/dL Final     5. Family history of early Coronary Artery Disease in a first degree relative (Male less than 55 years of age; Female less than 65 years of age): No  6.  Obesity and/or Metabolic Syndrome: No  7. Sedentary lifestyle: No    Past Medical History:   Diagnosis Date    Alpha 1-antitrypsin PiMS phenotype     Anxiety     2/27/24-no current meds    Asthma     inhalers prn    Chickenpox     Constipation     [place] measles     History of gout     Indigestion     omeprazole daily    Irritable bowel syndrome (IBS)     Mumps     PONV (postoperative nausea and vomiting)     with 1st surgery only    Right knee pain 02/27/2024         Family History   Problem Relation Age of Onset    Hypertension Mother     Hypertension Maternal Grandfather     Lung Disease Maternal Grandfather         alpha-1 antitrypsin    Diabetes Maternal Grandfather     Heart Disease Maternal Grandfather 60 - 69        MI    Lung Disease Paternal Grandfather     Cancer Child         leukemia type B    Stroke Other     Drug abuse Neg Hx     Alcohol abuse Neg Hx          Social History     Tobacco Use    Smoking status: Never    Smokeless tobacco: Never   Vaping Use    Vaping status: Never Used   Substance Use Topics    Alcohol use: Yes     Alcohol/week: 1.2 oz     Types: 2 Standard drinks or equivalent per week     Comment: 3 beers a day    Drug use: Yes     Types: Inhaled, Marijuana     Comment: Marijuana-daily, stop \"coke\" one month ago         Allergies   Allergen Reactions    Azithromycin Vomiting and Nausea     Had to go to ER.         Current Outpatient Medications   Medication Sig    Ibuprofen-diphenhydrAMINE Cit (ADVIL PM PO) Take  by mouth. Alternates with Tylenol PM    ALPRAZolam (XANAX) 0.5 MG Tab Take 1 Tablet by mouth at bedtime as needed for Anxiety for up to 30 days.    hydrOXYzine HCl (ATARAX) 25 " "MG Tab Take 1 Tablet by mouth 3 times a day as needed for Anxiety.    diphenhydrAMINE-APAP, sleep, (TYLENOL PM EXTRA STRENGTH)  MG Tab at bedtime. Alternates with Advil PM    hyoscyamine (LEVSIN) 0.125 MG SL Tab DISSOLVE 1-2 TABLETS UNDER THE TONGUE EVERY 6 HOURS AS NEEDED FOR PAIN    omeprazole (PRILOSEC) 40 MG delayed-release capsule Take 40 mg by mouth every day.    albuterol 108 (90 Base) MCG/ACT Aero Soln inhalation aerosol Inhale 2 Puffs every 6 hours as needed for Shortness of Breath.         Review of Systems   Constitutional: Negative for malaise/fatigue.   Cardiovascular:  Positive for chest pain and palpitations. Negative for dyspnea on exertion, leg swelling, near-syncope, orthopnea, paroxysmal nocturnal dyspnea and syncope.   Respiratory:  Negative for shortness of breath.    Neurological:  Negative for dizziness, headaches and light-headedness.           Objective:   /70 (BP Location: Left arm, Patient Position: Sitting)   Pulse (!) 58   Resp 16   Ht 1.854 m (6' 1\")   Wt 90.3 kg (199 lb)   SpO2 95%  Body mass index is 26.25 kg/m².         Physical Exam  Vitals reviewed.   Constitutional:       General: He is not in acute distress.     Appearance: Normal appearance.   HENT:      Head: Normocephalic and atraumatic.   Cardiovascular:      Rate and Rhythm: Normal rate and regular rhythm.      Pulses: Normal pulses.      Heart sounds: No murmur heard.  Pulmonary:      Effort: Pulmonary effort is normal. No respiratory distress.      Breath sounds: Normal breath sounds.   Musculoskeletal:      Right lower leg: No edema.      Left lower leg: No edema.   Neurological:      Mental Status: He is alert and oriented to person, place, and time.      Gait: Gait normal.   Psychiatric:         Behavior: Behavior normal.             Lab Results   Component Value Date/Time    SODIUM 135 04/01/2025 10:00 PM    POTASSIUM 4.2 04/01/2025 10:00 PM    CHLORIDE 101 04/01/2025 10:00 PM    CO2 22 04/01/2025 " 10:00 PM    GLUCOSE 91 04/01/2025 10:00 PM    BUN 14 04/01/2025 10:00 PM    CREATININE 1.02 04/01/2025 10:00 PM      Lab Results   Component Value Date/Time    WBC 8.3 04/01/2025 10:00 PM    RBC 4.81 04/01/2025 10:00 PM    HEMOGLOBIN 14.7 04/01/2025 10:00 PM    HEMATOCRIT 43.0 04/01/2025 10:00 PM    MCV 89.4 04/01/2025 10:00 PM    MCH 30.6 04/01/2025 10:00 PM    MCHC 34.2 04/01/2025 10:00 PM    MPV 8.9 (L) 04/01/2025 10:00 PM    NEUTSPOLYS 38.60 (L) 04/01/2025 10:00 PM    LYMPHOCYTES 43.40 (H) 04/01/2025 10:00 PM    MONOCYTES 12.80 04/01/2025 10:00 PM    EOSINOPHILS 3.80 04/01/2025 10:00 PM    BASOPHILS 1.20 04/01/2025 10:00 PM      Lab Results   Component Value Date/Time    CHOLSTRLTOT 205 (H) 10/25/2023 06:43 AM     (H) 10/25/2023 06:43 AM    HDL 62 10/25/2023 06:43 AM    TRIGLYCERIDE 67 10/25/2023 06:43 AM       Lab Results   Component Value Date/Time    TROPONINT <6 04/01/2025 2200     Lab Results   Component Value Date/Time    NTPROBNP 40 04/01/2025 2200     Assessment:   1. Other chest pain  - EC-ECHOCARDIOGRAM COMPLETE W/O CONT; Future  - Cardiac Event Monitor; Future    2. Palpitations  - Cardiac Event Monitor; Future    3. Dyslipidemia  - Lipid Profile; Future  - Lipoprotein (a); Future    Other orders  - Ibuprofen-diphenhydrAMINE Cit (ADVIL PM PO); Take  by mouth. Alternates with Tylenol PM        Medical Decision Making:  Today's Assessment / Plan:   Palpitations  Chest pain  - Longstanding atypical chest pain that worsens with musculoskeletal movement.  Patient has been having chest pain for over 5 years though does report increase in severity during emergency visit which has since resolved and improved with NSAIDs  - Echocardiogram to evaluate for any structural abnormalities  - Cardiac event monitor to evaluate for any concerning arrhythmias  - Patient had labs done recently including thyroid which were all within normal limits.  Lipid panel and lipoprotein a ordered for primary prevention.  -  Detail Level: Simple Pending further diagnostics if no concerning findings recommend following with primary care provider for noncardiac causes of chest pain  - Recommended patient try to decrease daily smoking of marijuana  - ER precautions or follow-up if continued or increased chest pain  - Continue taking ibuprofen 600 mg as needed for chest pain    Hyperlipidemia  -Most recent   -Goal of less than 100  -Check lipid panel in 3 months with [facility]  The 10-year ASCVD risk score (Shade DK, et al., 2019) is: 1.1%    Return for pending echocardiogram and cardiac event monitor.  Sooner if problems.    MERARY Jacques.    Detail Level: Generalized Detail Level: Detailed

## 2025-04-09 ENCOUNTER — OFFICE VISIT (OUTPATIENT)
Dept: CARDIOLOGY | Facility: MEDICAL CENTER | Age: 45
End: 2025-04-09
Attending: STUDENT IN AN ORGANIZED HEALTH CARE EDUCATION/TRAINING PROGRAM
Payer: COMMERCIAL

## 2025-04-09 VITALS
BODY MASS INDEX: 26.37 KG/M2 | OXYGEN SATURATION: 95 % | HEART RATE: 58 BPM | RESPIRATION RATE: 16 BRPM | HEIGHT: 73 IN | SYSTOLIC BLOOD PRESSURE: 118 MMHG | WEIGHT: 199 LBS | DIASTOLIC BLOOD PRESSURE: 70 MMHG

## 2025-04-09 DIAGNOSIS — R07.89 OTHER CHEST PAIN: ICD-10-CM

## 2025-04-09 DIAGNOSIS — R00.2 PALPITATIONS: ICD-10-CM

## 2025-04-09 DIAGNOSIS — E78.5 DYSLIPIDEMIA: ICD-10-CM

## 2025-04-09 PROCEDURE — 3074F SYST BP LT 130 MM HG: CPT

## 2025-04-09 PROCEDURE — 3078F DIAST BP <80 MM HG: CPT

## 2025-04-09 PROCEDURE — 99214 OFFICE O/P EST MOD 30 MIN: CPT

## 2025-04-09 PROCEDURE — 99212 OFFICE O/P EST SF 10 MIN: CPT

## 2025-04-09 ASSESSMENT — ENCOUNTER SYMPTOMS
HEADACHES: 0
DIZZINESS: 0
PALPITATIONS: 1
SYNCOPE: 0
NEAR-SYNCOPE: 0
PND: 0
SHORTNESS OF BREATH: 0
DYSPNEA ON EXERTION: 0
LIGHT-HEADEDNESS: 0
ORTHOPNEA: 0

## 2025-04-09 ASSESSMENT — FIBROSIS 4 INDEX: FIB4 SCORE: 0.72

## 2025-04-14 ENCOUNTER — NON-PROVIDER VISIT (OUTPATIENT)
Dept: CARDIOLOGY | Facility: MEDICAL CENTER | Age: 45
End: 2025-04-14
Payer: COMMERCIAL

## 2025-04-14 DIAGNOSIS — R07.89 OTHER CHEST PAIN: ICD-10-CM

## 2025-04-14 DIAGNOSIS — R00.2 PALPITATIONS: ICD-10-CM

## 2025-04-14 NOTE — PROGRESS NOTES
Home enrollment completed in the 14 day Zio Holter monitor per FELIX Jiménez.  Monitor will be sbipped to patient via [employer].  Pending EOS.

## 2025-04-25 ENCOUNTER — APPOINTMENT (OUTPATIENT)
Dept: ADMISSIONS | Facility: MEDICAL CENTER | Age: 45
End: 2025-04-25
Attending: ORTHOPAEDIC SURGERY
Payer: COMMERCIAL

## 2025-05-01 ENCOUNTER — PRE-ADMISSION TESTING (OUTPATIENT)
Dept: ADMISSIONS | Facility: MEDICAL CENTER | Age: 45
End: 2025-05-01
Attending: ORTHOPAEDIC SURGERY
Payer: COMMERCIAL

## 2025-05-01 VITALS — BODY MASS INDEX: 25.73 KG/M2 | HEIGHT: 73 IN

## 2025-05-01 RX ORDER — IBUPROFEN/DIPHENHYDRAMINE HCL 200MG-25MG
2 CAPSULE ORAL
Status: ON HOLD | COMMUNITY
End: 2025-05-07

## 2025-05-01 RX ORDER — OMEPRAZOLE 20 MG/1
20 CAPSULE, DELAYED RELEASE ORAL DAILY
COMMUNITY

## 2025-05-01 NOTE — PREADMIT AVS NOTE
Current Medications   Medication Instructions    Ibuprofen-diphenhydrAMINE HCl (QC IBUPROFEN-DIPHENHYDRAMINE) 200-25 MG Cap Hold now    omeprazole (PRILOSEC) 20 MG delayed-release capsule Continue taking medication as prescribed, including morning of procedure     meloxicam (MOBIC) 7.5 MG Tab Hold now    cyclobenzaprine (FLEXERIL) 10 mg Tab As needed medication, may take if needed, including morning of procedure     hydrOXYzine HCl (ATARAX) 25 MG Tab As needed medication, may take if needed, including morning of procedure     diphenhydrAMINE-APAP, sleep, (TYLENOL PM EXTRA STRENGTH)  MG Tab Continue     albuterol 108 (90 Base) MCG/ACT Aero Soln inhalation aerosol As needed medication, may take if needed, including morning of procedure       Spoke with toxicology team today who increased Suboxone to 8 mg 3 times daily in the setting of continued cravings  Toxicology will reassess patient tomorrow 3/23/2025 to see if new dose of Suboxone is adequate and tolerated  Discontinue clonidine as patient was placed on this in the past for withdrawal symptoms  Toxicology consult placed, appreciate recommendations   Continue to monitor for cravings and follow up with Toxicology   Substance cessation education and counseling

## 2025-05-06 ENCOUNTER — ANESTHESIA EVENT (OUTPATIENT)
Dept: SURGERY | Facility: MEDICAL CENTER | Age: 45
End: 2025-05-06
Payer: COMMERCIAL

## 2025-05-06 PROBLEM — M19.90 ARTHRITIS: Status: ACTIVE | Noted: 2025-05-06

## 2025-05-06 PROBLEM — Z14.8 ALPHA 1-ANTITRYPSIN PIMS PHENOTYPE: Status: ACTIVE | Noted: 2025-05-06

## 2025-05-07 ENCOUNTER — ANESTHESIA (OUTPATIENT)
Dept: SURGERY | Facility: MEDICAL CENTER | Age: 45
End: 2025-05-07
Payer: COMMERCIAL

## 2025-05-07 ENCOUNTER — HOSPITAL ENCOUNTER (OUTPATIENT)
Facility: MEDICAL CENTER | Age: 45
End: 2025-05-07
Attending: ORTHOPAEDIC SURGERY | Admitting: ORTHOPAEDIC SURGERY
Payer: COMMERCIAL

## 2025-05-07 VITALS
DIASTOLIC BLOOD PRESSURE: 83 MMHG | TEMPERATURE: 97.5 F | OXYGEN SATURATION: 99 % | HEIGHT: 73 IN | HEART RATE: 57 BPM | BODY MASS INDEX: 26.09 KG/M2 | WEIGHT: 196.87 LBS | RESPIRATION RATE: 16 BRPM | SYSTOLIC BLOOD PRESSURE: 143 MMHG

## 2025-05-07 PROBLEM — Z14.8 ALPHA 1-ANTITRYPSIN PIMS PHENOTYPE: Status: RESOLVED | Noted: 2025-05-06 | Resolved: 2025-05-07

## 2025-05-07 PROCEDURE — 160015 HCHG STAT PREOP MINUTES: Performed by: ORTHOPAEDIC SURGERY

## 2025-05-07 PROCEDURE — 700111 HCHG RX REV CODE 636 W/ 250 OVERRIDE (IP): Mod: JZ | Performed by: STUDENT IN AN ORGANIZED HEALTH CARE EDUCATION/TRAINING PROGRAM

## 2025-05-07 PROCEDURE — 160046 HCHG PACU - 1ST 60 MINS PHASE II: Performed by: ORTHOPAEDIC SURGERY

## 2025-05-07 PROCEDURE — 160002 HCHG RECOVERY MINUTES (STAT): Performed by: ORTHOPAEDIC SURGERY

## 2025-05-07 PROCEDURE — 700105 HCHG RX REV CODE 258: Performed by: ORTHOPAEDIC SURGERY

## 2025-05-07 PROCEDURE — A9270 NON-COVERED ITEM OR SERVICE: HCPCS | Performed by: STUDENT IN AN ORGANIZED HEALTH CARE EDUCATION/TRAINING PROGRAM

## 2025-05-07 PROCEDURE — 160048 HCHG OR STATISTICAL LEVEL 1-5: Performed by: ORTHOPAEDIC SURGERY

## 2025-05-07 PROCEDURE — 160035 HCHG PACU - 1ST 60 MINS PHASE I: Performed by: ORTHOPAEDIC SURGERY

## 2025-05-07 PROCEDURE — 160009 HCHG ANES TIME/MIN: Performed by: ORTHOPAEDIC SURGERY

## 2025-05-07 PROCEDURE — 160027 HCHG SURGERY MINUTES - 1ST 30 MINS LEVEL 2: Performed by: ORTHOPAEDIC SURGERY

## 2025-05-07 PROCEDURE — 700101 HCHG RX REV CODE 250: Performed by: STUDENT IN AN ORGANIZED HEALTH CARE EDUCATION/TRAINING PROGRAM

## 2025-05-07 PROCEDURE — 160038 HCHG SURGERY MINUTES - EA ADDL 1 MIN LEVEL 2: Performed by: ORTHOPAEDIC SURGERY

## 2025-05-07 PROCEDURE — 700102 HCHG RX REV CODE 250 W/ 637 OVERRIDE(OP): Performed by: STUDENT IN AN ORGANIZED HEALTH CARE EDUCATION/TRAINING PROGRAM

## 2025-05-07 PROCEDURE — 700111 HCHG RX REV CODE 636 W/ 250 OVERRIDE (IP): Mod: JZ | Performed by: ORTHOPAEDIC SURGERY

## 2025-05-07 PROCEDURE — 160025 RECOVERY II MINUTES (STATS): Performed by: ORTHOPAEDIC SURGERY

## 2025-05-07 PROCEDURE — 700101 HCHG RX REV CODE 250: Performed by: ORTHOPAEDIC SURGERY

## 2025-05-07 PROCEDURE — 160036 HCHG PACU - EA ADDL 30 MINS PHASE I: Performed by: ORTHOPAEDIC SURGERY

## 2025-05-07 RX ORDER — OXYCODONE HCL 5 MG/5 ML
10 SOLUTION, ORAL ORAL
Status: COMPLETED | OUTPATIENT
Start: 2025-05-07 | End: 2025-05-07

## 2025-05-07 RX ORDER — ONDANSETRON 2 MG/ML
INJECTION INTRAMUSCULAR; INTRAVENOUS PRN
Status: DISCONTINUED | OUTPATIENT
Start: 2025-05-07 | End: 2025-05-07 | Stop reason: SURG

## 2025-05-07 RX ORDER — LABETALOL HYDROCHLORIDE 5 MG/ML
5 INJECTION, SOLUTION INTRAVENOUS
Status: DISCONTINUED | OUTPATIENT
Start: 2025-05-07 | End: 2025-05-07 | Stop reason: HOSPADM

## 2025-05-07 RX ORDER — HYDROMORPHONE HYDROCHLORIDE 1 MG/ML
0.2 INJECTION, SOLUTION INTRAMUSCULAR; INTRAVENOUS; SUBCUTANEOUS
Status: DISCONTINUED | OUTPATIENT
Start: 2025-05-07 | End: 2025-05-07 | Stop reason: HOSPADM

## 2025-05-07 RX ORDER — METHOCARBAMOL 500 MG/1
1000 TABLET, FILM COATED ORAL
Status: COMPLETED | OUTPATIENT
Start: 2025-05-07 | End: 2025-05-07

## 2025-05-07 RX ORDER — EPINEPHRINE 1 MG/ML(1)
AMPUL (ML) INJECTION
Status: DISCONTINUED | OUTPATIENT
Start: 2025-05-07 | End: 2025-05-07 | Stop reason: HOSPADM

## 2025-05-07 RX ORDER — HALOPERIDOL 5 MG/ML
1 INJECTION INTRAMUSCULAR
Status: DISCONTINUED | OUTPATIENT
Start: 2025-05-07 | End: 2025-05-07 | Stop reason: HOSPADM

## 2025-05-07 RX ORDER — ACETAMINOPHEN 500 MG
1000 TABLET ORAL ONCE
Status: COMPLETED | OUTPATIENT
Start: 2025-05-07 | End: 2025-05-07

## 2025-05-07 RX ORDER — SODIUM CHLORIDE, SODIUM LACTATE, POTASSIUM CHLORIDE, CALCIUM CHLORIDE 600; 310; 30; 20 MG/100ML; MG/100ML; MG/100ML; MG/100ML
INJECTION, SOLUTION INTRAVENOUS CONTINUOUS
Status: DISCONTINUED | OUTPATIENT
Start: 2025-05-07 | End: 2025-05-07 | Stop reason: HOSPADM

## 2025-05-07 RX ORDER — HYDROMORPHONE HYDROCHLORIDE 1 MG/ML
0.1 INJECTION, SOLUTION INTRAMUSCULAR; INTRAVENOUS; SUBCUTANEOUS
Status: DISCONTINUED | OUTPATIENT
Start: 2025-05-07 | End: 2025-05-07 | Stop reason: HOSPADM

## 2025-05-07 RX ORDER — MIDAZOLAM HYDROCHLORIDE 1 MG/ML
INJECTION INTRAMUSCULAR; INTRAVENOUS PRN
Status: DISCONTINUED | OUTPATIENT
Start: 2025-05-07 | End: 2025-05-07 | Stop reason: SURG

## 2025-05-07 RX ORDER — ROCURONIUM BROMIDE 10 MG/ML
INJECTION, SOLUTION INTRAVENOUS PRN
Status: DISCONTINUED | OUTPATIENT
Start: 2025-05-07 | End: 2025-05-07 | Stop reason: SURG

## 2025-05-07 RX ORDER — DEXAMETHASONE SODIUM PHOSPHATE 4 MG/ML
INJECTION, SOLUTION INTRA-ARTICULAR; INTRALESIONAL; INTRAMUSCULAR; INTRAVENOUS; SOFT TISSUE PRN
Status: DISCONTINUED | OUTPATIENT
Start: 2025-05-07 | End: 2025-05-07 | Stop reason: SURG

## 2025-05-07 RX ORDER — OXYCODONE HCL 5 MG/5 ML
5 SOLUTION, ORAL ORAL
Status: COMPLETED | OUTPATIENT
Start: 2025-05-07 | End: 2025-05-07

## 2025-05-07 RX ORDER — LIDOCAINE HYDROCHLORIDE 20 MG/ML
INJECTION, SOLUTION EPIDURAL; INFILTRATION; INTRACAUDAL; PERINEURAL PRN
Status: DISCONTINUED | OUTPATIENT
Start: 2025-05-07 | End: 2025-05-07 | Stop reason: SURG

## 2025-05-07 RX ORDER — CEFAZOLIN SODIUM 1 G/3ML
INJECTION, POWDER, FOR SOLUTION INTRAMUSCULAR; INTRAVENOUS PRN
Status: DISCONTINUED | OUTPATIENT
Start: 2025-05-07 | End: 2025-05-07 | Stop reason: SURG

## 2025-05-07 RX ORDER — ALBUTEROL SULFATE 5 MG/ML
2.5 SOLUTION RESPIRATORY (INHALATION)
Status: DISCONTINUED | OUTPATIENT
Start: 2025-05-07 | End: 2025-05-07 | Stop reason: HOSPADM

## 2025-05-07 RX ORDER — DIPHENHYDRAMINE HYDROCHLORIDE 50 MG/ML
12.5 INJECTION, SOLUTION INTRAMUSCULAR; INTRAVENOUS
Status: DISCONTINUED | OUTPATIENT
Start: 2025-05-07 | End: 2025-05-07 | Stop reason: HOSPADM

## 2025-05-07 RX ORDER — KETOROLAC TROMETHAMINE 15 MG/ML
INJECTION, SOLUTION INTRAMUSCULAR; INTRAVENOUS PRN
Status: DISCONTINUED | OUTPATIENT
Start: 2025-05-07 | End: 2025-05-07 | Stop reason: SURG

## 2025-05-07 RX ORDER — ALPRAZOLAM 0.5 MG
0.5 TABLET ORAL NIGHTLY PRN
COMMUNITY
End: 2025-05-13

## 2025-05-07 RX ORDER — HYDROMORPHONE HYDROCHLORIDE 1 MG/ML
0.4 INJECTION, SOLUTION INTRAMUSCULAR; INTRAVENOUS; SUBCUTANEOUS
Status: DISCONTINUED | OUTPATIENT
Start: 2025-05-07 | End: 2025-05-07 | Stop reason: HOSPADM

## 2025-05-07 RX ORDER — EPHEDRINE SULFATE 50 MG/ML
5 INJECTION, SOLUTION INTRAVENOUS
Status: DISCONTINUED | OUTPATIENT
Start: 2025-05-07 | End: 2025-05-07 | Stop reason: HOSPADM

## 2025-05-07 RX ORDER — BUPIVACAINE HYDROCHLORIDE AND EPINEPHRINE 5; 5 MG/ML; UG/ML
INJECTION, SOLUTION PERINEURAL
Status: DISCONTINUED | OUTPATIENT
Start: 2025-05-07 | End: 2025-05-07 | Stop reason: HOSPADM

## 2025-05-07 RX ORDER — HYDRALAZINE HYDROCHLORIDE 20 MG/ML
5 INJECTION INTRAMUSCULAR; INTRAVENOUS
Status: DISCONTINUED | OUTPATIENT
Start: 2025-05-07 | End: 2025-05-07 | Stop reason: HOSPADM

## 2025-05-07 RX ORDER — ONDANSETRON 2 MG/ML
4 INJECTION INTRAMUSCULAR; INTRAVENOUS
Status: DISCONTINUED | OUTPATIENT
Start: 2025-05-07 | End: 2025-05-07 | Stop reason: HOSPADM

## 2025-05-07 RX ORDER — HYDROMORPHONE HYDROCHLORIDE 2 MG/ML
INJECTION, SOLUTION INTRAMUSCULAR; INTRAVENOUS; SUBCUTANEOUS PRN
Status: DISCONTINUED | OUTPATIENT
Start: 2025-05-07 | End: 2025-05-07 | Stop reason: SURG

## 2025-05-07 RX ADMIN — ONDANSETRON 4 MG: 2 INJECTION INTRAMUSCULAR; INTRAVENOUS at 07:16

## 2025-05-07 RX ADMIN — HYDROMORPHONE HYDROCHLORIDE 1 MG: 2 INJECTION INTRAMUSCULAR; INTRAVENOUS; SUBCUTANEOUS at 07:16

## 2025-05-07 RX ADMIN — KETOROLAC TROMETHAMINE 15 MG: 15 INJECTION, SOLUTION INTRAMUSCULAR; INTRAVENOUS at 07:27

## 2025-05-07 RX ADMIN — DEXAMETHASONE SODIUM PHOSPHATE 8 MG: 4 INJECTION INTRA-ARTICULAR; INTRALESIONAL; INTRAMUSCULAR; INTRAVENOUS; SOFT TISSUE at 07:14

## 2025-05-07 RX ADMIN — CEFAZOLIN 2 G: 1 INJECTION, POWDER, FOR SOLUTION INTRAMUSCULAR; INTRAVENOUS at 07:04

## 2025-05-07 RX ADMIN — ROCURONIUM BROMIDE 60 MG: 10 INJECTION INTRAVENOUS at 07:01

## 2025-05-07 RX ADMIN — FENTANYL CITRATE 50 MCG: 50 INJECTION, SOLUTION INTRAMUSCULAR; INTRAVENOUS at 07:01

## 2025-05-07 RX ADMIN — LIDOCAINE HYDROCHLORIDE 100 MG: 20 INJECTION, SOLUTION EPIDURAL; INFILTRATION; INTRACAUDAL; PERINEURAL at 07:01

## 2025-05-07 RX ADMIN — MIDAZOLAM HYDROCHLORIDE 2 MG: 1 INJECTION, SOLUTION INTRAMUSCULAR; INTRAVENOUS at 06:57

## 2025-05-07 RX ADMIN — HYDROMORPHONE HYDROCHLORIDE 0.2 MG: 1 INJECTION, SOLUTION INTRAMUSCULAR; INTRAVENOUS; SUBCUTANEOUS at 08:45

## 2025-05-07 RX ADMIN — PROPOFOL 200 MG: 10 INJECTION, EMULSION INTRAVENOUS at 07:01

## 2025-05-07 RX ADMIN — METHOCARBAMOL 1000 MG: 500 TABLET ORAL at 08:59

## 2025-05-07 RX ADMIN — OXYCODONE HYDROCHLORIDE 10 MG: 5 SOLUTION ORAL at 07:55

## 2025-05-07 RX ADMIN — ACETAMINOPHEN 1000 MG: 500 TABLET, FILM COATED ORAL at 06:08

## 2025-05-07 RX ADMIN — SUGAMMADEX 200 MG: 100 INJECTION, SOLUTION INTRAVENOUS at 07:30

## 2025-05-07 RX ADMIN — SODIUM CHLORIDE, POTASSIUM CHLORIDE, SODIUM LACTATE AND CALCIUM CHLORIDE: 600; 310; 30; 20 INJECTION, SOLUTION INTRAVENOUS at 06:08

## 2025-05-07 RX ADMIN — FENTANYL CITRATE 50 MCG: 50 INJECTION, SOLUTION INTRAMUSCULAR; INTRAVENOUS at 08:31

## 2025-05-07 RX ADMIN — FENTANYL CITRATE 50 MCG: 50 INJECTION, SOLUTION INTRAMUSCULAR; INTRAVENOUS at 07:56

## 2025-05-07 RX ADMIN — FENTANYL CITRATE 50 MCG: 50 INJECTION, SOLUTION INTRAMUSCULAR; INTRAVENOUS at 07:15

## 2025-05-07 ASSESSMENT — PAIN DESCRIPTION - PAIN TYPE
TYPE: SURGICAL PAIN

## 2025-05-07 ASSESSMENT — FIBROSIS 4 INDEX: FIB4 SCORE: 0.74

## 2025-05-07 ASSESSMENT — PAIN SCALES - GENERAL: PAIN_LEVEL: 3

## 2025-05-07 NOTE — OR NURSING
0738 Pt arrived to PACU. 6L in place, pt removed own O2. surgical dressing placed in OR, CDI. VSS. Pt able to move all extremities.       0756: gave pt 10mg PO oxy and 50mcg of fent for pain of 7-8/10    0800: pt states little relief, pain 7/10    0805: pt refused redose of fent, he said it did not help. He would like something else. Waiting on ANS.    0817 Called Carina sheridan for updates      0800 Patient is tolerating sips      0831: pt agreed to second dose of fent.       0845:  0.2 mg dialudid given for 5/10pain    0855: spoke with ANS, waiting of order for PO robaxin.      0859: gave pt 1000mg PO robaxin    0905   Patient meets criteria for phase 2. Called report LILO dubois      0924 Pt transferred to phase 2

## 2025-05-07 NOTE — ANESTHESIA PROCEDURE NOTES
Airway    Date/Time: 5/7/2025 7:03 AM    Performed by: Gabriel Cedillo M.D.  Authorized by: Gabriel Cedillo M.D.    Location:  OR  Urgency:  Elective  Difficult Airway: No    Indications for Airway Management:  Anesthesia      Spontaneous Ventilation: absent    Sedation Level:  Deep  Preoxygenated: Yes    Patient Position:  Sniffing  Mask Difficulty Assessment:  0 - not attempted  Final Airway Type:  Endotracheal airway  Final Endotracheal Airway:  ETT  Cuffed: Yes    Technique Used for Successful ETT Placement:  Direct laryngoscopy  Devices/Methods Used in Placement:  Cricoid pressure    Insertion Site:  Oral  Blade Type:  Ellen  Laryngoscope Blade/Videolaryngoscope Blade Size:  3  ETT Size (mm):  8.0  Measured from:  Teeth  ETT to Teeth (cm):  24  Placement Verified by: auscultation and capnometry    Cormack-Lehane Classification:  Grade I - full view of glottis  Number of Attempts at Approach:  1

## 2025-05-07 NOTE — DISCHARGE INSTRUCTIONS
Surgeon specific instructions  -Weight bearing as tolerated to the left lower extremity.  -You may shower on post-op day #2 (5/9) with the dressing covered.      ACTIVITY: Rest and take it easy for the first 24 hours.  A responsible adult is recommended to remain with you during that time.  It is normal to feel sleepy.  We encourage you to not do anything that requires balance, judgment or coordination.    MILD FLU-LIKE SYMPTOMS ARE NORMAL. YOU MAY EXPERIENCE GENERALIZED MUSCLE ACHES, THROAT IRRITATION, HEADACHE AND/OR SOME NAUSEA.    FOR 24 HOURS DO NOT:  Drive, operate machinery or run household appliances.  Drink beer or alcoholic beverages.   Make important decisions or sign legal documents.      Activity:   You may put full weight on your leg as you feel comfortable.  Crutches may be used to help you walk but are not absolutely necessary unless otherwise indicated.  You may work on flexing and extending your knee immediately as your pain level allows.    Dressing and Wound Care:    Keep your knee dressing clean and dry after surgery.      Shower / Bathing:    You may shower on post-op day #2 (5/9) with the dressing covered.    Apply Ice Pack to Knee:   Apply ice packs to your knee (15 minutes on the knee, 15 minutes off the knee) for the first week, as you feel necessary to help with the pain and swelling.    Elevation:   Keep your knee and foot elevated as much as possible to control swelling.  Be aware that a mild-moderate amount of knee swelling is expected.      SWELLING/BRUISING  Swelling is an expected response to surgery. To reduce swelling, elevate your surgical limb above heart level multiple times per day and apply ice (see Ice instructions). If your swelling becomes excessive, is limiting your ability to move, or becomes worrisome please contact your doctor's office.    Bruising often does not appear until after you arrive home and can be quite dramatic-appearing purple, black, or green. Bruising is  typically not concerning and will subside without any treatment.       DIET: To avoid nausea, slowly advance diet as tolerated, avoiding spicy or greasy foods for the first day.  Add more substantial food to your diet according to your physician's instructions.   INCREASE FLUIDS AND FIBER TO AVOID CONSTIPATION.      FOLLOW-UP APPOINTMENT:  A follow-up appointment should be arranged with your doctor; call to schedule.    You should CALL YOUR PHYSICIAN if you develop:  Fever greater than 101 degrees F.  Pain not relieved by medication, or persistent nausea or vomiting.  Excessive bleeding (blood soaking through dressing) or unexpected drainage from the wound.  Extreme redness or swelling around the incision site, drainage of pus or foul smelling drainage.  Inability to urinate or empty your bladder within 8 hours.  Problems with breathing or chest pain.    You should call 911 if you develop problems with breathing or chest pain.  If you are unable to contact your doctor or surgical center, you should go to the nearest emergency room or urgent care center.  Physician's telephone #: [phone]    If any questions arise, call your doctor.  If your doctor is not available, please feel free to call the Surgical Center at [phone].     A registered nurse may call you a few days after your surgery to see how you are doing after your procedure.    MEDICATIONS: Resume taking daily medication.  Take prescribed pain medication with food.  If no medication is prescribed, you may take non-aspirin pain medication if needed.  PAIN MEDICATION CAN BE VERY CONSTIPATING.  Take a stool softener or laxative such as senokot, pericolace, or milk of magnesia if needed.     Last pain medication given at 7:56 AM, oxycodone 10mg.     If your physician has prescribed pain medication that includes Acetaminophen (Tylenol), do not take additional Acetaminophen (Tylenol) while taking the prescribed medication.

## 2025-05-07 NOTE — OR SURGEON
Immediate Post OP Note    PreOp Diagnosis: internal derangement left knee      PostOp Diagnosis: left knee medial meniscal tear     Left knee patellar chondromalacia      Procedure(s):  LEFT KNEE ARTHROSCOPY PARTIAL MEDIAL MENISCECTOMY; patella chondroplasty - Wound Class: Clean    Surgeon(s):  Conner Molina M.D.    Anesthesiologist/Type of Anesthesia:  Anesthesiologist: Gabriel Cedillo M.D./General    Surgical Staff:  Circulator: Mary Barrett R.N.  Scrub Person: Shannan Butler    Specimens removed if any:  * No specimens in log *    Estimated Blood Loss: minimal    Findings: med menis tear, patellar chondromalacia    Complications: none        5/7/2025 7:36 AM Conner Molina M.D.

## 2025-05-07 NOTE — OP REPORT
DATE OF SERVICE:  05/07/2025     PREOPERATIVE DIAGNOSIS:  Internal derangement, left knee.     POSTOPERATIVE DIAGNOSES:  1.  Left knee medial meniscal tear.  2.  Left knee patellar chondromalacia.     PROCEDURES:  1.  Arthroscopy, left knee, surgical; with partial medial meniscectomy.  2.  Arthroscopy, left knee, surgical; with patellar chondroplasty.     SURGEON:  Conner Molina MD     ASSISTANT:  None.     ANESTHESIA:  General endotracheal anesthesia.     ANESTHESIOLOGIST:  Gabriel Cedillo MD     ESTIMATED BLOOD LOSS:  Minimal.     COMPLICATIONS:  None.     INDICATIONS FOR PROCEDURE:  This is a 45-year-old male with significant   mechanical symptoms of his left knee.  He has failed conservative management.    MRI demonstrates medial meniscal tear.  He presents for operative treatment.    For further details of H and P, please see chart.     Risks, benefits and alternatives of procedure were discussed with the patient   and include but are not limited to bleeding, infection, neurovascular injury,   need for further surgery, PE, DVT, blood transfusion with its associated   risks, anesthesia with its associated risks, and death.  All questions were   answered.  No warranties or guarantees were given or implied.  Consent is   signed and is on chart.     DESCRIPTION OF PROCEDURE:  The patient was taken to the operating room and   placed supine on the operating table.  General endotracheal anesthesia was   induced.  The patient's left leg was placed in the arthroscopic leg yung.    Right leg was well padded.  The foot of the bed was dropped.  Left lower   extremity was prepped and draped in normal sterile fashion.  Superolateral,   anterolateral, and anteromedials were identified and infiltrated with 0.5%   Marcaine with epinephrine.  Superolateral portal was incised and outflow   cannula placed in the knee.  An anterolateral portal was incised and   arthroscope placed in the knee.  Examination of the knee  showed outflow   cannula to be in good position.  There was a single loose body in the medial   gutter, which was scarred into the synovium.  No loose bodies in the lateral   gutter.  There was a 1.5 cm fissure at the apex of the patella with an area of   significant undermined cartilage.  Trochlea was intact.  ACL was intact.    Lateral compartment showed no chondromalacia with a normal-appearing meniscus.    Medial compartment showed grade 1 chondromalacia of the tibia.  Femur was   intact.  There was a degenerative-type tear from the anterior and posterior   horn of the medial meniscus.  Medial portal was incised.  A probe was placed.    Probing of the medial meniscus showed the aforementioned tears.  Using a   combination of biter and jaleel, these were shaved back to a stable edge.    Further probing showed no subluxation or other abnormalities.  Attention was   then placed laterally.  The lateral meniscus was intact with no signs of   tearing.  Arthroscope was then placed into the patellofemoral joint.  There   was area one single loose body that was scarred medially.  This was excised   without difficulty.  The one area of undermined flap of the cartilage on the   apex of the patella was very carefully debrided back to a stable edge removing   a couple of millimeters of cartilage.  Further probing showed full-thickness   fissuring, but no underlying flaps.  Further examination of the knee showed no   further abnormalities.  The arthroscope was removed from the knee.  As much   fluid was expressed as possible.  The portals were closed using 4-0 nylon.    The patient was placed in a soft sterile dressing.  He was awakened from   anesthesia and returned to the recovery cart and to the recovery room in   stable condition.  There were no medardo or intraoperative complications noted.        ______________________________  MD MATTI Godfrey/JUVENAL    DD:  05/07/2025 07:40  DT:  05/07/2025 09:20    Job#:   [id number]

## 2025-05-07 NOTE — ANESTHESIA TIME REPORT
Anesthesia Start and Stop Event Times       Date Time Event    5/7/2025 0654 Anesthesia Start     0741 Anesthesia Stop          Responsible Staff  05/07/25      Name Role Begin End    Gabriel Cedillo M.D. Anesth [id number]          Overtime Reason:  no overtime (within assigned shift)    Comments:

## 2025-05-07 NOTE — ANESTHESIA POSTPROCEDURE EVALUATION
Patient: Diego Abarca    Procedure Summary       Date: 05/07/25 Room / Location:  OR 03 / SURGERY [facility]    Anesthesia Start: 0654 Anesthesia Stop: 0741    Procedure: LEFT KNEE ARTHROSCOPY PARTIAL MEDIAL MENISCECTOMY; patella chondroplasty (Left: Knee) Diagnosis: (OTHER TEAR OF MEDIAL MENISCUS, CURRENT INJURY, LEFT KNEE, INITIAL ENCOUNTER - LEFT)    Surgeons: Conner Molina M.D. Responsible Provider: Gabriel Cedillo M.D.    Anesthesia Type: general ASA Status: 2            Final Anesthesia Type: general  Last vitals  BP   Blood Pressure: (!) 143/83    Temp   36.4 °C (97.5 °F)    Pulse   (!) 57   Resp   16    SpO2   99 %      Anesthesia Post Evaluation    Patient location during evaluation: PACU  Patient participation: complete - patient participated  Level of consciousness: awake and alert  Pain score: 3    Airway patency: patent  Anesthetic complications: no  Cardiovascular status: hemodynamically stable  Respiratory status: acceptable  Hydration status: euvolemic    PONV: none          No notable events documented.

## 2025-05-07 NOTE — ANESTHESIA PREPROCEDURE EVALUATION
Case: [id number] Date/Time: 05/07/25 0645    Procedure: LEFT KNEE ARTHROSCOPY PARTIAL MEDIAL MENISCECTOMY    Pre-op diagnosis: OTHER TEAR OF MEDIAL MENISCUS, CURRENT INJURY, LEFT KNEE, INITIAL ENCOUNTER - LEFT    Location:  OR 03 / SURGERY [facility]    Surgeons: Conner Molina M.D.          45M w/PMH alpha 1 antritrypsin defic. Per chart review. Pt states this is not true. He has mild asmtha and uses inhaler maybe once per 6 months.  Relevant Problems   PULMONARY   (positive) Asthma   (positive) Mild intermittent asthma without complication      NEURO   (positive) Headache      GI   (positive) Gastroesophageal reflux disease with esophagitis         (positive) Nephrolithiasis      Other   (positive) Anxiety   (positive) Arthritis   (positive) Gout     Lab Results   Component Value Date/Time    WBC 8.3 04/01/2025 10:00 PM    RBC 4.81 04/01/2025 10:00 PM    HEMOGLOBIN 14.7 04/01/2025 10:00 PM    HEMATOCRIT 43.0 04/01/2025 10:00 PM    MCV 89.4 04/01/2025 10:00 PM    MCH 30.6 04/01/2025 10:00 PM    MCHC 34.2 04/01/2025 10:00 PM    RDW 43.7 04/01/2025 10:00 PM    PLATELETCT 304 04/01/2025 10:00 PM    MPV 8.9 (L) 04/01/2025 10:00 PM    NEUTSPOLYS 38.60 (L) 04/01/2025 10:00 PM    LYMPHOCYTES 43.40 (H) 04/01/2025 10:00 PM    MONOCYTES 12.80 04/01/2025 10:00 PM    EOSINOPHILS 3.80 04/01/2025 10:00 PM    BASOPHILS 1.20 04/01/2025 10:00 PM        Lab Results   Component Value Date/Time    SODIUM 135 04/01/2025 10:00 PM    POTASSIUM 4.2 04/01/2025 10:00 PM    CHLORIDE 101 04/01/2025 10:00 PM    CO2 22 04/01/2025 10:00 PM    GLUCOSE 91 04/01/2025 10:00 PM    BUN 14 04/01/2025 10:00 PM    CREATININE 1.02 04/01/2025 10:00 PM    eGFR historically >60    LFTs WNL    EKG  Results for orders placed or performed during the hospital encounter of 04/01/25   EKG   Result Value Ref Range    Report       [facility] Emergency Dept.    Test Date:  2025-04-01  Pt Name:    THU EATSON                  Department: [facility]  MRN:        [id number]                      Room:  Gender:     Male                         Technician: [id number]  :        1980                   Requested By:ER TRIAGE PROTOCOL  Order #:    [id number]                    Reading MD: Brody Munguia    Measurements  Intervals                                Axis  Rate:       61                           P:          57  AK:         186                          QRS:        85  QRSD:       103                          T:          42  QT:         427  QTc:        430    Interpretive Statements  Sinus rhythm  Probable left ventricular hypertrophy  ST elev, probable normal early repol pattern  Compared to ECG 2019 10:17:32  Sinus bradycardia no longer present  ST (T wave) deviation still present  Electronically Signed On 2025 22:14:48 PDT by Brody Munguia          Physical Exam    Airway   Mallampati: II  TM distance: >3 FB  Neck ROM: full       Cardiovascular - normal exam  Rhythm: regular  Rate: normal     Dental - normal exam           Pulmonary - normal exam  Breath sounds clear to auscultation     Abdominal    Neurological - normal exam                   Anesthesia Plan    ASA 2       Plan - general       Airway plan will be ETT          Induction: intravenous    Postoperative Plan: Postoperative administration of opioids is intended.    Pertinent diagnostic labs and testing reviewed    Informed Consent:    Anesthetic plan and risks discussed with patient.    Use of blood products discussed with: patient whom consented to blood products.       Risks of general anesthesia discussed including sore throat, damage to lips/teeth, anaphylaxis/drug reaction, aspiration, awareness, post-op nausea/vomiting, post-op delirium, myocardial infarction, cerebral vascular accident up to and including death.

## 2025-05-07 NOTE — OR NURSING
0909:Pt arrived to stage 2 via gurney and getting dressed self.     0915:Family at bedside    0945:Patient education completed, family denies further questions.DC'd to care of family post uneventful stay in PACU 2. IV discontinued. Pt picked up script prior to surgery    0949:Pt taken out via wc and placed into care of family

## 2025-05-07 NOTE — OR NURSING
Procedure, patient allergies, and NPO status verified. Home med rec reviewed with pt--medications, dosages, and last time taken correct per pt. Belongings secured. Patient verbalizes understanding of pain scale, expected course of stay, and plan of care. Surgical site verified with pt, IV access established, and SCD placed on RLE.

## 2025-05-13 ENCOUNTER — APPOINTMENT (OUTPATIENT)
Dept: MEDICAL GROUP | Facility: MEDICAL CENTER | Age: 45
End: 2025-05-13
Payer: COMMERCIAL

## 2025-05-13 VITALS
DIASTOLIC BLOOD PRESSURE: 96 MMHG | HEART RATE: 74 BPM | SYSTOLIC BLOOD PRESSURE: 132 MMHG | BODY MASS INDEX: 26.32 KG/M2 | HEIGHT: 73 IN | TEMPERATURE: 97.9 F | RESPIRATION RATE: 14 BRPM | WEIGHT: 198.6 LBS | OXYGEN SATURATION: 97 %

## 2025-05-13 DIAGNOSIS — F41.9 ANXIETY: ICD-10-CM

## 2025-05-13 DIAGNOSIS — S83.242D TEAR OF MEDIAL MENISCUS OF LEFT KNEE, CURRENT, UNSPECIFIED TEAR TYPE, SUBSEQUENT ENCOUNTER: ICD-10-CM

## 2025-05-13 DIAGNOSIS — F13.20 BENZODIAZEPINE DEPENDENCE (HCC): ICD-10-CM

## 2025-05-13 DIAGNOSIS — K13.70 LESION OF ORAL MUCOSA: ICD-10-CM

## 2025-05-13 PROCEDURE — 99214 OFFICE O/P EST MOD 30 MIN: CPT

## 2025-05-13 PROCEDURE — 3080F DIAST BP >= 90 MM HG: CPT

## 2025-05-13 PROCEDURE — 3075F SYST BP GE 130 - 139MM HG: CPT

## 2025-05-13 RX ORDER — VALACYCLOVIR HYDROCHLORIDE 1 G/1
1000 TABLET, FILM COATED ORAL 2 TIMES DAILY
Qty: 14 TABLET | Refills: 0 | Status: CANCELLED | OUTPATIENT
Start: 2025-05-13 | End: 2025-05-20

## 2025-05-13 RX ORDER — BENZONATATE 200 MG/1
CAPSULE ORAL
COMMUNITY
End: 2025-05-13

## 2025-05-13 RX ORDER — TRIAMCINOLONE ACETONIDE 1 MG/G
CREAM TOPICAL
COMMUNITY

## 2025-05-13 RX ORDER — OXYCODONE AND ACETAMINOPHEN 5; 325 MG/1; MG/1
TABLET ORAL
COMMUNITY
Start: 2025-05-02

## 2025-05-13 RX ORDER — KETOCONAZOLE 20 MG/G
CREAM TOPICAL
COMMUNITY

## 2025-05-13 RX ORDER — CELECOXIB 200 MG/1
1 CAPSULE ORAL
COMMUNITY
End: 2025-05-13

## 2025-05-13 RX ORDER — CEPHALEXIN 500 MG/1
CAPSULE ORAL
COMMUNITY
End: 2025-05-13

## 2025-05-13 RX ORDER — ALPRAZOLAM 0.5 MG
0.5 TABLET ORAL NIGHTLY PRN
Qty: 30 TABLET | Refills: 1 | Status: SHIPPED | OUTPATIENT
Start: 2025-05-13 | End: 2025-07-12

## 2025-05-13 RX ORDER — MELOXICAM 7.5 MG/1
1 TABLET ORAL 2 TIMES DAILY
COMMUNITY

## 2025-05-13 RX ORDER — DEXTROMETHORPHAN HYDROBROMIDE AND PROMETHAZINE HYDROCHLORIDE 15; 6.25 MG/5ML; MG/5ML
SYRUP ORAL
COMMUNITY
End: 2025-05-13

## 2025-05-13 ASSESSMENT — FIBROSIS 4 INDEX: FIB4 SCORE: 0.74

## 2025-05-13 ASSESSMENT — ENCOUNTER SYMPTOMS
PALPITATIONS: 0
ORTHOPNEA: 0
SHORTNESS OF BREATH: 0
COUGH: 0
FEVER: 0
CHILLS: 0

## 2025-05-13 NOTE — PROGRESS NOTES
Subjective:     Verbal consent was acquired by the patient to use [employer] ambient listening note generation during this visit Yes    CC: Follow-Up (Pt states he got a cold sore on his lip about 3 months ago and it hasn't gone away.)      HPI:   Diego is a 45 y.o. 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    Prescription Refill for Alprazolam  The patient requests a refill of his alprazolam prescription, noting that he has approximately 10 tablets remaining. He states he was worried about running out before he could get another appointment, so he came to his apt today.     Meniscal Tear  He reports significant discomfort due to a meniscal tear, which was surgically addressed 6 days prior. The pain has been severe, particularly over the past month, but he is now beginning to experience some relief and is gradually returning to normal sleep patterns. He has been under considerable stress during this period. Postoperatively, he experienced initial relief, followed by a resurgence of pain after 2 days, which he attributes to overexertion. He has been managing the pain with oxycodone, of which he has 8 tablets remaining, and has used it sparingly, taking only 1 tablet at night when necessary. He is hopeful that the pain will subside this month, allowing him to return to work and resume his normal activities.  - Onset: Pain has been severe over the past month; surgically addressed 6 days prior.  - Duration: Severe pain for the past month; postoperative pain resurgence after 2 days.  - Character: Significant discomfort; severe pain; initial relief post-surgery followed by resurgence.  - Alleviating/Aggravating Factors: Overexertion attributed to pain resurgence; managing pain with oxycodone.  - Timing: Pain severe over the past month; postoperative pain resurgence  after 2 days; taking oxycodone sparingly at night.  - Severity: Severe pain; hopeful for pain to subside this month.    Oral lesion  The patient has been dealing with persistent lesion on the inside of his lower lip for the past 4 months. The lesion is characterized by a small bump, which he can palpate with his tongue, but it has not progressed to a vesicle. It has never been painful. He does not use tobacco but admits to marijuana use. He has not sought dental advice for this issue and has not had a dental check-up in approximately 2 years. The lesion has remained unchanged for several months. He has undergone a computed tomography (CT) scan recently of the orbits, which did not reveal any abnormalities. But it did not include the oral cavity on the scan.   - Onset: Persistent for the past 4 months.  - Location: Lip.  - Character: Small bump, palpable with tongue; has not progressed to a vesicle.  - Duration: Unchanged for several months.      PAST SURGICAL HISTORY:  Arthroscopic surgery for meniscal tear on 05/07/2025.    SOCIAL HISTORY  He does not smoke or use tobacco but uses marijuana.         Allergies: Azithromycin     Medications:   Current Outpatient Medications:     ketoconazole (NIZORAL) 2 % Cream, APPLY 1 APPLICATION TOPICALLY EVERY DAY FOR 14 DAYS., Disp: , Rfl:     meloxicam (MOBIC) 7.5 MG Tab, Take 1 Tablet by mouth 2 times a day., Disp: , Rfl:     oxyCODONE-acetaminophen (PERCOCET) 5-325 MG Tab, 1-2 tabs po q6hrs prn pain x 7 days, Disp: , Rfl:     triamcinolone acetonide (KENALOG) 0.1 % Cream, APPLY TOPICALLY TWICE A DAY FOR 7 DAYS, Disp: , Rfl:     ALPRAZolam (XANAX) 0.5 MG Tab, Take 1 Tablet by mouth at bedtime as needed for Anxiety for up to 60 days., Disp: 30 Tablet, Rfl: 1    omeprazole (PRILOSEC) 20 MG delayed-release capsule, Take 20 mg by mouth every day., Disp: , Rfl:     cyclobenzaprine (FLEXERIL) 10 mg Tab, Take 1 Tablet by mouth every 8 hours., Disp: 30 Tablet, Rfl: 0     "diphenhydrAMINE-APAP, sleep, (TYLENOL PM EXTRA STRENGTH)  MG Tab, Take 2 Tablets by mouth at bedtime. Alternates with Advil PM, Disp: , Rfl:       ROS:  Review of Systems   Constitutional:  Negative for chills and fever.   Respiratory:  Negative for cough and shortness of breath.    Cardiovascular:  Negative for chest pain, palpitations, orthopnea and leg swelling.       Objective:     Exam:  BP (!) 132/96   Pulse 74   Temp 36.6 °C (97.9 °F) (Temporal)   Resp 14   Ht 1.854 m (6' 1\")   Wt 90.1 kg (198 lb 9.6 oz)   SpO2 97%   BMI 26.20 kg/m²  Body mass index is 26.2 kg/m².    Physical Exam  Constitutional:       Appearance: He is normal weight.   Eyes:      Pupils: Pupils are equal, round, and reactive to light.   Cardiovascular:      Rate and Rhythm: Normal rate and regular rhythm.      Pulses: Normal pulses.      Heart sounds: Normal heart sounds.   Pulmonary:      Effort: Pulmonary effort is normal.      Breath sounds: Normal breath sounds.   Neurological:      Mental Status: He is alert and oriented to person, place, and time.   Psychiatric:         Mood and Affect: Mood normal.         Behavior: Behavior normal.           Assessment & Plan:     Diego a 45 y.o. male with the following -     Assessment & Plan  Meniscus tear.    Cold sore on the lip.    Medication management.           1. Anxiety  2. Benzodiazepine dependence (HCC)  Chronic, stable  Anxiety fairly well controlled. Prescription for Xanax, providing a 60-day supply, will be issued.  Diagnostic plan: PDMP checked and is as expected.  Clinical decision making: Discussed the importance of managing stress and pain to reduce reliance on medication.  - ALPRAZolam (XANAX) 0.5 MG Tab; Take 1 Tablet by mouth at bedtime as needed for Anxiety for up to 60 days.  Dispense: 30 Tablet; Refill: 1    3. Tear of medial meniscus of left knee, current, unspecified tear type, subsequent encounter  Chronic, stable  He underwent surgery for a meniscus tear 6 " days ago on the left knee and is currently experiencing some relief.  Treatment plan: Postoperative pain was significant initially but has improved; he reports using oxycodone sparingly with approximately 8 pills remaining. Advised to allow his knee to heal properly to avoid further complications and to avoid excessive activity. Encouraged to monitor pain levels and avoid overexertion to prevent setbacks in recovery.    4. Lesion of oral mucosa  Undiagnosed problem with uncertain prognosis  Persistent cold sore on his lip for 4 months, presenting as a bump without blister formation or pain.  Diagnostic plan: Advised to consult with a dentist for further evaluation. If the dentist is unable to provide a definitive diagnosis, a comprehensive head CT scan may be considered or referral to ENT.  Clinical decision making: Discussed the possibility of excess growth of oral tissue and the importance of timely evaluation to rule out other conditions.    Other orders  - ketoconazole (NIZORAL) 2 % Cream; APPLY 1 APPLICATION TOPICALLY EVERY DAY FOR 14 DAYS.  - meloxicam (MOBIC) 7.5 MG Tab; Take 1 Tablet by mouth 2 times a day.  - oxyCODONE-acetaminophen (PERCOCET) 5-325 MG Tab; 1-2 tabs po q6hrs prn pain x 7 days  - triamcinolone acetonide (KENALOG) 0.1 % Cream; APPLY TOPICALLY TWICE A DAY FOR 7 DAYS        Anticipatory guidance included the following: Patient counseled about skin care, diet, supplements, smoking, drugs/alcohol use, safe sex and exercise.     Return if symptoms worsen or fail to improve.    Please note that this dictation was created using voice recognition software. I have made every reasonable attempt to correct obvious errors, but I expect that there are errors of grammar and possibly content that I did not discover before finalizing the note.

## 2025-07-29 ENCOUNTER — OFFICE VISIT (OUTPATIENT)
Dept: MEDICAL GROUP | Facility: MEDICAL CENTER | Age: 45
End: 2025-07-29
Payer: COMMERCIAL

## 2025-07-29 VITALS
SYSTOLIC BLOOD PRESSURE: 105 MMHG | RESPIRATION RATE: 16 BRPM | HEART RATE: 60 BPM | DIASTOLIC BLOOD PRESSURE: 78 MMHG | BODY MASS INDEX: 26.44 KG/M2 | OXYGEN SATURATION: 97 % | TEMPERATURE: 97.5 F | WEIGHT: 200.4 LBS

## 2025-07-29 DIAGNOSIS — K21.00 GASTROESOPHAGEAL REFLUX DISEASE WITH ESOPHAGITIS WITHOUT HEMORRHAGE: ICD-10-CM

## 2025-07-29 DIAGNOSIS — R07.89 COSTOCHONDRAL CHEST PAIN: ICD-10-CM

## 2025-07-29 DIAGNOSIS — L72.9 CYST OF SKIN: Primary | ICD-10-CM

## 2025-07-29 DIAGNOSIS — F41.9 ANXIETY: ICD-10-CM

## 2025-07-29 RX ORDER — ALPRAZOLAM 0.5 MG
0.5 TABLET ORAL NIGHTLY PRN
Qty: 30 TABLET | Refills: 2 | Status: SHIPPED | OUTPATIENT
Start: 2025-07-29 | End: 2025-10-27

## 2025-07-29 ASSESSMENT — ENCOUNTER SYMPTOMS
SHORTNESS OF BREATH: 0
COUGH: 0
FEVER: 0
ORTHOPNEA: 0
CHILLS: 0
PALPITATIONS: 0

## 2025-07-29 ASSESSMENT — FIBROSIS 4 INDEX: FIB4 SCORE: 0.74

## 2025-07-29 NOTE — PROGRESS NOTES
Subjective:     Verbal consent was acquired by the patient to use [employer] ambient listening note generation during this visit Yes    CC: Other (Medication refill, chest pressures, GERD, lump on head)      HPI:   Diego is a 45 y.o. male who presents today for:    History of Present Illness  The patient presents with a cephalic mass, xiphoid process inflammation, and requests a medication refill.    Cephalic Mass  The patient has observed a small cephalic mass on the back of his head, which he perceives as non-threatening. He recalls a similar lesion in the past that resolved spontaneously. However, he notes that the current mass differs in sensation from the previous one. The mass was first noticed approximately six months ago. Additionally, he reports frequent minor head trauma, and is uncertain if they are related.    Xiphoid Process pain  The patient describes intermittent inflammation of the xiphoid process, particularly noticeable towards the end of the day, accompanied by tenderness in the region. He suspects that his occupational exposure to dust may be contributory. He has a documented history of costochondritis, which manifests as significant thoracic pain.    Anxiety  The patient requests a refill of his alprazolam prescription. He has discontinued the use of cyclobenzaprine, acetaminophen PM, and ibuprofen PM. He continues to take omeprazole daily. His alprazolam continues to be beneficial.     GERD  He continues to use omeprazole daily. This helps manage his GERD.            Allergies: Azithromycin     Medications: Current Medications[1]      ROS:  Review of Systems   Constitutional:  Negative for chills and fever.   Respiratory:  Negative for cough and shortness of breath.    Cardiovascular:  Negative for chest pain, palpitations, orthopnea and leg swelling.       Objective:     Exam:  /78 (BP Location: Left arm, Patient Position: Sitting)   Pulse 60   Temp 36.4 °C (97.5 °F) (Temporal)    Resp 16   Wt 90.9 kg (200 lb 6.4 oz)   SpO2 97%   BMI 26.44 kg/m²  Body mass index is 26.44 kg/m².    Physical Exam  Constitutional:       Appearance: He is normal weight.   Eyes:      Pupils: Pupils are equal, round, and reactive to light.   Cardiovascular:      Rate and Rhythm: Normal rate and regular rhythm.      Pulses: Normal pulses.      Heart sounds: Normal heart sounds.   Pulmonary:      Effort: Pulmonary effort is normal.      Breath sounds: Normal breath sounds.   Neurological:      Mental Status: He is alert and oriented to person, place, and time.   Psychiatric:         Mood and Affect: Mood normal.         Behavior: Behavior normal.           Assessment & Plan:     Diego farr 45 y.o. male with the following -     Assessment & Plan    1. Cyst of skin  Acute, uncomplicated  The lump on the head appears to be in the early stages of a sebaceous cyst. Physical examination revealed a small, non-painful lump.  Treatment plan: Advised to monitor the lump for changes such as enlargement, hair loss around the area, or pain. Referral to a dermatologist will be considered if the lump grows or becomes bothersome.    2. Anxiety  Chronic, stable  PDMP reviewed, no sign of misuse. Refill for Xanax provided.   - ALPRAZolam (XANAX) 0.5 MG Tab; Take 1 Tablet by mouth at bedtime as needed for Anxiety for up to 90 days.  Dispense: 30 Tablet; Refill: 2    3. Gastroesophageal reflux disease with esophagitis without hemorrhage  Chronic, stable  Symptoms currently controlled with Omeprazole 20 mg daily. Continue current omeprazole regimen.    4. Costochondral chest pain  Acute, uncomplicated  I suspect irritation of the xiphoid process potentially due to lifting or moving objects. Physical examination indicated superficial tenderness, consistent with costochondritis.  Treatment plan: Advised to use over-the-counter anti-inflammatory medications such as ibuprofen or Aleve during flare-ups.     Anticipatory guidance included  the following: Patient counseled about skin care, diet, supplements, smoking, drugs/alcohol use, safe sex and exercise.     Return in about 3 months (around 10/29/2025).    Please note that this dictation was created using voice recognition software. I have made every reasonable attempt to correct obvious errors, but I expect that there are errors of grammar and possibly content that I did not discover before finalizing the note.              [1]   Current Outpatient Medications:     ALPRAZolam (XANAX) 0.5 MG Tab, Take 1 Tablet by mouth at bedtime as needed for Anxiety for up to 90 days., Disp: 30 Tablet, Rfl: 2    omeprazole (PRILOSEC) 20 MG delayed-release capsule, Take 20 mg by mouth every day., Disp: , Rfl:

## 2025-08-13 ENCOUNTER — PATIENT MESSAGE (OUTPATIENT)
Dept: MEDICAL GROUP | Facility: MEDICAL CENTER | Age: 45
End: 2025-08-13
Payer: COMMERCIAL

## 2025-08-13 DIAGNOSIS — J45.20 MILD INTERMITTENT ASTHMA WITHOUT COMPLICATION: ICD-10-CM

## 2025-08-13 DIAGNOSIS — R06.02 SHORTNESS OF BREATH: ICD-10-CM

## 2025-08-13 DIAGNOSIS — R06.2 WHEEZING: ICD-10-CM

## 2025-08-14 ENCOUNTER — RESULTS FOLLOW-UP (OUTPATIENT)
Dept: CARDIOLOGY | Facility: MEDICAL CENTER | Age: 45
End: 2025-08-14

## 2025-08-14 ENCOUNTER — HOSPITAL ENCOUNTER (OUTPATIENT)
Dept: CARDIOLOGY | Facility: MEDICAL CENTER | Age: 45
End: 2025-08-14
Payer: COMMERCIAL

## 2025-08-14 DIAGNOSIS — R07.89 OTHER CHEST PAIN: ICD-10-CM

## 2025-08-14 LAB
LV EJECT FRACT  99904: 65
LV EJECT FRACT MOD 2C 99903: 65.69
LV EJECT FRACT MOD 4C 99902: 64.12
LV EJECT FRACT MOD BP 99901: 65.52

## 2025-08-14 PROCEDURE — 93306 TTE W/DOPPLER COMPLETE: CPT

## 2025-08-14 PROCEDURE — 93306 TTE W/DOPPLER COMPLETE: CPT | Mod: 26 | Performed by: INTERNAL MEDICINE

## 2025-08-14 RX ORDER — FLUTICASONE FUROATE AND VILANTEROL 100; 25 UG/1; UG/1
1 POWDER RESPIRATORY (INHALATION) DAILY
Qty: 60 EACH | Refills: 5 | Status: SHIPPED | OUTPATIENT
Start: 2025-08-14

## 2025-08-14 RX ORDER — ALBUTEROL SULFATE 90 UG/1
2 INHALANT RESPIRATORY (INHALATION) EVERY 6 HOURS PRN
Qty: 8.5 G | Refills: 4 | Status: SHIPPED | OUTPATIENT
Start: 2025-08-14

## 2025-08-27 DIAGNOSIS — L72.9 CYST OF SKIN: Primary | ICD-10-CM

## 2025-09-02 ENCOUNTER — APPOINTMENT (OUTPATIENT)
Dept: MEDICAL GROUP | Facility: MEDICAL CENTER | Age: 45
End: 2025-09-02
Payer: COMMERCIAL

## (undated) DEVICE — GLOVE SURGICAL PROTEXIS PI 8.0 LF - (50PR/BX)

## (undated) DEVICE — SHAVER4.0 AGGRESSIVE + FORMLA - ORDER IN MULITIPLES OF 5 (5EA/BX)

## (undated) DEVICE — COVER LIGHT HANDLE FLEXIBLE - SOFT (2EA/PK 80PK/CA)

## (undated) DEVICE — PACK KNEE ARTHROSCOPY SM OR - (2EA/CA)

## (undated) DEVICE — GLOVE SZ 7.5 BIOGEL PI MICRO - PF LF (50PR/BX)

## (undated) DEVICE — Device

## (undated) DEVICE — CHLORAPREP 26 ML APPLICATOR - ORANGE TINT(25/CA)

## (undated) DEVICE — TUBING DAY USE W/CARTRIDGE (10EA/BX)

## (undated) DEVICE — SHAVER4.0 AGGRESSIVE + FORMLA (5EA/BX)

## (undated) DEVICE — TUBING CASSETTE CROSSFLOW INTEGRATED (10EA/CA)

## (undated) DEVICE — TUBING CASSETTE CROSSFLOW INTEGRATED (1/EA) ORDER MULTIPLES OF 10

## (undated) DEVICE — SUTURE GENERAL

## (undated) DEVICE — DRAPE LARGE 3 QUARTER - (20/CA)

## (undated) DEVICE — GLOVE BIOGEL INDICATOR SZ 8.5 SURGICAL PF LTX - (50/BX 4BX/CA)

## (undated) DEVICE — SODIUM CHL. IRRIGATION 0.9% 3000ML (4EA/CA 65CA/PF)

## (undated) DEVICE — GLOVE BIOGEL PI INDICATOR SZ 7.0 SURGICAL PF LF - (50/BX 4BX/CA)

## (undated) DEVICE — TOWEL STOP TIMEOUT SAFETY FLAG (40EA/CA)

## (undated) DEVICE — CUFF 30 X 4 TOURNIQUET 2 PORT DISPOSABLE STERILE (10EA/BX)

## (undated) DEVICE — SUTURE 4-0 ETHILON FS-2 18 (36PK/BX)"

## (undated) DEVICE — PADDING CAST 6 IN STERILE - 6 X 4 YDS (24/CA)

## (undated) DEVICE — TUBING DAY USE W/CARTRIDGE (1EA) ORDER MULTIPLES OF 10

## (undated) DEVICE — SENSOR OXIMETER ADULT SPO2 RD SET (20EA/BX)

## (undated) DEVICE — GLOVE BIOGEL SZ 6.5 SURGICAL PF LTX (50PR/BX 4BX/CA)

## (undated) DEVICE — DRESSING 3X8 ADAPTIC GAUZE - NON-ADHERING (36/PK 6PK/BX)